# Patient Record
Sex: FEMALE | Race: WHITE | NOT HISPANIC OR LATINO | Employment: OTHER | URBAN - METROPOLITAN AREA
[De-identification: names, ages, dates, MRNs, and addresses within clinical notes are randomized per-mention and may not be internally consistent; named-entity substitution may affect disease eponyms.]

---

## 2017-04-24 ENCOUNTER — OFFICE VISIT (OUTPATIENT)
Dept: FAMILY MEDICINE | Facility: CLINIC | Age: 67
End: 2017-04-24
Payer: COMMERCIAL

## 2017-04-24 VITALS
HEART RATE: 74 BPM | WEIGHT: 132.6 LBS | DIASTOLIC BLOOD PRESSURE: 71 MMHG | BODY MASS INDEX: 25.04 KG/M2 | SYSTOLIC BLOOD PRESSURE: 105 MMHG | HEIGHT: 61 IN

## 2017-04-24 DIAGNOSIS — Z12.11 SPECIAL SCREENING FOR MALIGNANT NEOPLASMS, COLON: ICD-10-CM

## 2017-04-24 DIAGNOSIS — Z23 ENCOUNTER FOR IMMUNIZATION: ICD-10-CM

## 2017-04-24 DIAGNOSIS — M27.69 FAILING DENTAL IMPLANT: ICD-10-CM

## 2017-04-24 DIAGNOSIS — Z98.84 S/P GASTRIC BYPASS: ICD-10-CM

## 2017-04-24 DIAGNOSIS — F33.42 RECURRENT MAJOR DEPRESSION IN COMPLETE REMISSION (H): ICD-10-CM

## 2017-04-24 DIAGNOSIS — Z00.00 ROUTINE GENERAL MEDICAL EXAMINATION AT A HEALTH CARE FACILITY: Primary | ICD-10-CM

## 2017-04-24 DIAGNOSIS — E55.9 VITAMIN D DEFICIENCY: ICD-10-CM

## 2017-04-24 LAB — DEPRECATED CALCIDIOL+CALCIFEROL SERPL-MC: 11 UG/L (ref 20–75)

## 2017-04-24 PROCEDURE — 82306 VITAMIN D 25 HYDROXY: CPT | Performed by: FAMILY MEDICINE

## 2017-04-24 PROCEDURE — 99397 PER PM REEVAL EST PAT 65+ YR: CPT | Mod: 25 | Performed by: FAMILY MEDICINE

## 2017-04-24 PROCEDURE — 90670 PCV13 VACCINE IM: CPT | Performed by: FAMILY MEDICINE

## 2017-04-24 PROCEDURE — 90471 IMMUNIZATION ADMIN: CPT | Performed by: FAMILY MEDICINE

## 2017-04-24 PROCEDURE — 36415 COLL VENOUS BLD VENIPUNCTURE: CPT | Performed by: FAMILY MEDICINE

## 2017-04-24 RX ORDER — SERTRALINE HYDROCHLORIDE 100 MG/1
100 TABLET, FILM COATED ORAL DAILY
Qty: 90 TABLET | Refills: 3 | Status: SHIPPED | OUTPATIENT
Start: 2017-04-24 | End: 2018-06-20

## 2017-04-24 ASSESSMENT — ANXIETY QUESTIONNAIRES
2. NOT BEING ABLE TO STOP OR CONTROL WORRYING: NOT AT ALL
GAD7 TOTAL SCORE: 1
7. FEELING AFRAID AS IF SOMETHING AWFUL MIGHT HAPPEN: NOT AT ALL
6. BECOMING EASILY ANNOYED OR IRRITABLE: SEVERAL DAYS
5. BEING SO RESTLESS THAT IT IS HARD TO SIT STILL: NOT AT ALL
1. FEELING NERVOUS, ANXIOUS, OR ON EDGE: NOT AT ALL
3. WORRYING TOO MUCH ABOUT DIFFERENT THINGS: NOT AT ALL
IF YOU CHECKED OFF ANY PROBLEMS ON THIS QUESTIONNAIRE, HOW DIFFICULT HAVE THESE PROBLEMS MADE IT FOR YOU TO DO YOUR WORK, TAKE CARE OF THINGS AT HOME, OR GET ALONG WITH OTHER PEOPLE: NOT DIFFICULT AT ALL

## 2017-04-24 ASSESSMENT — PATIENT HEALTH QUESTIONNAIRE - PHQ9: 5. POOR APPETITE OR OVEREATING: NOT AT ALL

## 2017-04-24 NOTE — NURSING NOTE
"Chief Complaint   Patient presents with     Physical     annual physical     Refill Request     refills       Initial /71  Pulse 74  Ht 5' 1\" (1.549 m)  Wt 132 lb 9.6 oz (60.1 kg)  BMI 25.05 kg/m2 Estimated body mass index is 25.05 kg/(m^2) as calculated from the following:    Height as of this encounter: 5' 1\" (1.549 m).    Weight as of this encounter: 132 lb 9.6 oz (60.1 kg).  Medication Reconciliation: complete  "

## 2017-04-24 NOTE — MR AVS SNAPSHOT
After Visit Summary   4/24/2017    Carly Gregg    MRN: [id number]           Patient Information     Date Of Birth          1950        Visit Information        Provider Department      4/24/2017 7:20 AM Ayde Tatum MD [facility]        Today's Diagnoses     Encounter for immunization    -  1    Special screening for malignant neoplasms, colon        Recurrent major depression in complete remission (H)        S/P gastric bypass        Failing dental implant          Care Instructions      Preventive Health Recommendations  Female Ages 65 +    Yearly exam:     See your health care provider every year in order to  o Review health changes.   o Discuss preventive care.    o Review your medicines if your doctor has prescribed any.      You no longer need a yearly Pap test unless you've had an abnormal Pap test in the past 10 years. If you have vaginal symptoms, such as bleeding or discharge, be sure to talk with your provider about a Pap test.      Every 1 to 2 years, have a mammogram.  If you are over 69, talk with your health care provider about whether or not you want to continue having screening mammograms.      Every 10 years, have a colonoscopy. Or, have a yearly FIT test (stool test). These exams will check for colon cancer.       Have a cholesterol test every 5 years, or more often if your doctor advises it.       Have a diabetes test (fasting glucose) every three years. If you are at risk for diabetes, you should have this test more often.       At age 65, have a bone density scan (DEXA) to check for osteoporosis (brittle bone disease).    Shots:    Get a flu shot each year.    Get a tetanus shot every 10 years.    Talk to your doctor about your pneumonia vaccines. There are now two you should receive - Pneumovax (PPSV 23) and Prevnar (PCV 13).    Talk to your doctor about the shingles vaccine.    Talk to your doctor about the hepatitis B vaccine.    Nutrition:      Eat at least 5 servings of fruits and vegetables each day.      Eat whole-grain bread, whole-wheat pasta and brown rice instead of white grains and rice.      Talk to your provider about Calcium and Vitamin D.     Lifestyle    Exercise at least 150 minutes a week (30 minutes a day, 5 days a week). This will help you control your weight and prevent disease.      Limit alcohol to one drink per day.      No smoking.       Wear sunscreen to prevent skin cancer.       See your dentist twice a year for an exam and cleaning.      See your eye doctor every 1 to 2 years to screen for conditions such as glaucoma, macular degeneration, cataracts, etc     The 10-year ASCVD risk score (Katelynnteofilo VIVEROS Jr, et al., 2013) is: 3%    Values used to calculate the score:      Age: 66 years      Sex: Female      Is Non- : No      Diabetic: No      Tobacco smoker: No      Systolic Blood Pressure: 105 mmHg      Is BP treated: No      HDL Cholesterol: 91 mg/dL      Total Cholesterol: 194 mg/dL          Follow-ups after your visit        Future tests that were ordered for you today     Open Future Orders        Priority Expected Expires Ordered    DX Hip/Pelvis/Spine Routine  4/24/2018 4/24/2017    Fecal colorectal cancer screen (FIT) Routine 5/15/2017 7/17/2017 4/24/2017            Who to contact     If you have questions or need follow up information about today's clinic visit or your schedule please contact [facility] directly at [phone].  Normal or non-critical lab and imaging results will be communicated to you by MyChart, letter or phone within 4 business days after the clinic has received the results. If you do not hear from us within 7 days, please contact the clinic through MyChart or phone. If you have a critical or abnormal lab result, we will notify you by phone as soon as possible.  Submit refill requests through [employer] or call your pharmacy and they will forward the refill request to us.  "Please allow 3 business days for your refill to be completed.          Additional Information About Your Visit        MyChart Information     [employer] gives you secure access to your electronic health record. If you see a primary care provider, you can also send messages to your care team and make appointments. If you have questions, please call your primary care clinic.  If you do not have a primary care provider, please call [phone] and they will assist you.        Care EveryWhere ID     This is your Care EveryWhere ID. This could be used by other organizations to access your [facility] medical records  [id number]        Your Vitals Were     Pulse Height BMI (Body Mass Index)             74 5' 1\" (1.549 m) 25.05 kg/m2          Blood Pressure from Last 3 Encounters:   04/24/17 105/71   06/30/16 112/69   06/25/16 105/69    Weight from Last 3 Encounters:   04/24/17 132 lb 9.6 oz (60.1 kg)   06/30/16 138 lb (62.6 kg)   06/24/16 138 lb 7.2 oz (62.8 kg)              We Performed the Following     PNEUMOCOCCAL CONJ VACCINE 13 VALENT IM     Vitamin D Deficiency          Where to get your medicines      These medications were sent to [email or web address] [address] [place] [address] AT [place] [id number] [facility] [address]     Phone:  [phone]     Folic Acid-Vit B6-Vit B12 2.5-25-1 MG Tabs         Some of these will need a paper prescription and others can be bought over the counter.  Ask your nurse if you have questions.     Bring a paper prescription for each of these medications     sertraline 100 MG tablet          Primary Care Provider Office Phone # Fax #    Ayde Tatum [phone]       [facility] [address] [facility] [address]        Thank you!     Thank you for choosing [facility]  for your care. Our goal is always to provide you with excellent care. Hearing back from our patients is one way we can " continue to improve our services. Please take a few minutes to complete the written survey that you may receive in the mail after your visit with us. Thank you!             Your Updated Medication List - Protect others around you: Learn how to safely use, store and throw away your medicines at www.disposemymeds.org.          This list is accurate as of: 4/24/17  7:45 AM.  Always use your most recent med list.                   Brand Name Dispense Instructions for use    Folic Acid-Vit B6-Vit B12 2.5-25-1 MG Tabs     90 tablet    Take 1 tablet by mouth daily       sertraline 100 MG tablet    ZOLOFT    90 tablet    Take 1 tablet (100 mg) by mouth daily

## 2017-04-24 NOTE — PATIENT INSTRUCTIONS

## 2017-04-24 NOTE — PROGRESS NOTES
SUBJECTIVE:     CC: Carly Gregg is an 66 year old woman who presents for preventive health visit.     Healthy Habits:    Do you get at least three servings of calcium containing foods daily (dairy, green leafy vegetables, etc.)? Yes    Amount of exercise or daily activities, outside of work: 3 day(s) per week    Problems taking medications regularly No    Medication side effects: No    Have you had an eye exam in the past two years? no    Do you see a dentist twice per year? yes    Do you have sleep apnea, excessive snoring or daytime drowsiness? no        Today's PHQ-2 Score:   PHQ-2 ( 1999 Pf[employer]) 4/18/2017 2/26/2016   Q1: Little interest or pleasure in doing things - -   Q2: Feeling down, depressed or hopeless - -   PHQ-2 Score - -   Little interest or pleasure in doing things Several days Not at all   Feeling down, depressed or hopeless Not at all Not at all   PHQ-2 Score 1 0       Abuse: Current or Past(Physical, Sexual or Emotional)- No  Do you feel safe in your environment - Yes    Social History   Substance Use Topics     Smoking status: Never Smoker     Smokeless tobacco: Never Used     Alcohol use Yes      Comment: glass of wine monthly if that     The patient does not drink >3 drinks per day nor >7 drinks per week.    Recent Labs   Lab Test  03/02/16   0749  02/21/13   0845  02/20/12   1358   CHOL  194  191  201*   HDL  91  69  82   LDL  91  110  99   TRIG  60  58  103   CHOLHDLRATIO   --   3.0  2.0   NHDL  103   --    --        Reviewed orders with patient.  Reviewed health maintenance and updated orders accordingly - Yes    Mammo Decision Support:  Patient over age 50, mutual decision to screen reflected in health maintenance.    Pertinent mammograms are reviewed under the imaging tab.  History of abnormal Pap smear:   Last 3 Pap Results:   PAP (no units)   Date Value   02/25/2014 NIL   02/20/2012 NIL       Reviewed and updated as needed this visit by clinical staff  Tobacco  Med Hx  Surg  Hx  Fam Hx  Soc Hx        Reviewed and updated as needed this visit by Provider        Past Medical History:   Diagnosis Date     B12 deficiency      Insomnia      Mood swings (H)       Past Surgical History:   Procedure Laterality Date     ESOPHAGOSCOPY, GASTROSCOPY, DUODENOSCOPY (EGD), COMBINED N/A 2016    Procedure: COMBINED ESOPHAGOSCOPY, GASTROSCOPY, DUODENOSCOPY (EGD);  Surgeon: Rivas Jade MD;  Location: [facility]     GASTRIC BYPASS       TONSILLECTOMY  age 29     Obstetric History       T0      TAB0   SAB0   E0   M0   L1       # Outcome Date GA Lbr [place]/2nd Weight Sex Delivery Anes PTL Lv   1                Obstetric Comments           ROS:  C: NEGATIVE for fever, chills, change in weight  I: NEGATIVE for worrisome rashes, moles or lesions  E: NEGATIVE for vision changes or irritation  ENT: NEGATIVE for ear, mouth and throat problems  R: NEGATIVE for significant cough or SOB  B: NEGATIVE for masses, tenderness or discharge  CV: NEGATIVE for chest pain, palpitations or peripheral edema  GI: NEGATIVE for nausea, abdominal pain, heartburn, or change in bowel habits  : NEGATIVE for unusual urinary or vaginal symptoms. No vaginal bleeding.  M: NEGATIVE for significant arthralgias or myalgia  N: NEGATIVE for weakness, dizziness or paresthesias  P: NEGATIVE for changes in mood or affect     BP Readings from Last 3 Encounters:   17 105/71   16 112/69   16 105/69    Wt Readings from Last 3 Encounters:   17 132 lb 9.6 oz (60.1 kg)   16 138 lb (62.6 kg)   16 138 lb 7.2 oz (62.8 kg)                  Patient Active Problem List   Diagnosis     Contact dermatitis     CARDIOVASCULAR SCREENING; LDL GOAL LESS THAN 160     Advanced directives, counseling/discussion     S/P gastric bypass     Insomnia     Recurrent major depression in complete remission (H)     B12 deficiency anemia     Psychophysiological insomnia     Acute upper  gastrointestinal bleeding     Past Surgical History:   Procedure Laterality Date     ESOPHAGOSCOPY, GASTROSCOPY, DUODENOSCOPY (EGD), COMBINED N/A 2016    Procedure: COMBINED ESOPHAGOSCOPY, GASTROSCOPY, DUODENOSCOPY (EGD);  Surgeon: Rivas Jade MD;  Location: [facility]     GASTRIC BYPASS       TONSILLECTOMY  age 29       Social History   Substance Use Topics     Smoking status: Never Smoker     Smokeless tobacco: Never Used     Alcohol use Yes      Comment: glass of wine monthly if that     Family History   Problem Relation Age of Onset     CANCER Mother      stomach cancer     Cardiovascular Mother      MI     DIABETES Mother      C.A.D. Maternal Grandfather      bypass     Genitourinary Problems Brother      kidney transplant     HEART DISEASE Sister      heart murmur     HEART DISEASE Sister      heart murmur     CANCER Father      bone and blood cancer  6 months later     Suicide Maternal Grandmother      Fransisco     Suicide Brother      Hcristian         Current Outpatient Prescriptions   Medication Sig Dispense Refill     sertraline (ZOLOFT) 100 MG tablet Take 1 tablet (100 mg) by mouth daily 90 tablet 3     Folic Acid-Vit B6-Vit B12 2.5-25-1 MG TABS Take 1 tablet by mouth daily 90 tablet 3     [DISCONTINUED] sertraline (ZOLOFT) 100 MG tablet Take 1 tablet (100 mg) by mouth daily 90 tablet 3     [DISCONTINUED] Folic Acid-Vit B6-Vit B12 2.5-25-1 MG TABS Take  by mouth. Take one daily. (Patient taking differently: Take 1 tablet by mouth daily ) 90 tablet 3     Allergies   Allergen Reactions     Morphine Other (See Comments)     Morphine injection given for Hemorid infection.  Patient says she had trouble breathing and her heart raced.     Penicillins Unknown     Novocain [Procaine Hcl] Rash     Recent Labs   Lab Test  16   0749  14   1623   13   0845  12   1358   LDL   --   91   --    --   110  99   HDL   --   91   --    --   69  82   TRIG   --   60   --     "--   58  103   ALT  27   --   40   --    --    --    CR  0.67  0.79  0.75   < >  0.75   --    GFRESTIMATED  88  73  78   < >  79   --    GFRESTBLACK  >90   GFR Calc    88  >90   GFR Calc     < >  >90   --    POTASSIUM  4.3  4.5  3.9   < >  4.5   --    TSH   --    --    --    --   1.85   --     < > = values in this interval not displayed.      OBJECTIVE:     /71  Pulse 74  Ht 5' 1\" (1.549 m)  Wt 132 lb 9.6 oz (60.1 kg)  BMI 25.05 kg/m2  EXAM:  GENERAL APPEARANCE: healthy, alert and no distress  EYES: Eyes grossly normal to inspection, PERRL and conjunctivae and sclerae normal  HENT: ear canals and TM's normal, nose and mouth without ulcers or lesions, oropharynx clear and oral mucous membranes moist  NECK: no adenopathy, no asymmetry, masses, or scars and thyroid normal to palpation  RESP: lungs clear to auscultation - no rales, rhonchi or wheezes  BREAST: normal without masses, tenderness or nipple discharge and no palpable axillary masses or adenopathy  CV: regular rate and rhythm, normal S1 S2, no S3 or S4, no murmur, click or rub, no peripheral edema and peripheral pulses strong  ABDOMEN: soft, nontender, no hepatosplenomegaly, no masses and bowel sounds normal   (female): deferred  MS: no musculoskeletal defects are noted and gait is age appropriate without ataxia  SKIN: no suspicious lesions or rashes  NEURO: Normal strength and tone, sensory exam grossly normal, mentation intact and speech normal  PSYCH: mentation appears normal and affect normal/bright    ASSESSMENT/PLAN:     1. Routine general medical examination at a health care facility  Low risk cervical cancer, low risk breast cancer.    2. Failing dental implant  Dentist requested vitamin D evaluation  - DX Hip/Pelvis/Spine; Future  - Vitamin D Deficiency    3. Recurrent major depression in complete remission (H)  due for review and refill, taking medication without difficulty  - sertraline (ZOLOFT) 100 MG " "tablet; Take 1 tablet (100 mg) by mouth daily  Dispense: 90 tablet; Refill: 3    4. Special screening for malignant neoplasms, colon  - Fecal colorectal cancer screen (FIT); Future    5. S/P gastric bypass  - Folic Acid-Vit B6-Vit B12 2.5-25-1 MG TABS; Take 1 tablet by mouth daily  Dispense: 90 tablet; Refill: 3    6. Encounter for immunization  - PNEUMOCOCCAL CONJ VACCINE 13 VALENT IM    COUNSELING:   Reviewed preventive health counseling, as reflected in patient instructions       Regular exercise       Healthy diet/nutrition       Vision screening       Hearing screening         reports that she has never smoked. She has never used smokeless tobacco.    Estimated body mass index is 25.05 kg/(m^2) as calculated from the following:    Height as of this encounter: 5' 1\" (1.549 m).    Weight as of this encounter: 132 lb 9.6 oz (60.1 kg).       Counseling Resources:  ATP IV Guidelines  Pooled Cohorts Equation Calculator  Breast Cancer Risk Calculator  FRAX Risk Assessment  ICSI Preventive Guidelines  Dietary Guidelines for Americans, 2010  [employer]'s MyPlate  ASA Prophylaxis  Lung CA Screening    Ayde Tatum MD  [facility] for HPI/ROS submitted by the patient on 4/18/2017   Annual Exam:  Getting at least 3 servings of Calcium per day:: NO  Bi-annual eye exam:: NO  Dental care twice a year:: Yes  Sleep apnea or symptoms of sleep apnea:: None  Diet:: Regular (no restrictions)  Frequency of exercise:: 2-3 days/week  Taking medications regularly:: No  Medication side effects:: Not applicable  Additional concerns today:: YES  Q1: Little interest or pleasure in doing things: 1=Several days  Q2: Feeling down, depressed or hopeless: 0=Not at all  PHQ-2 Score: 1  Duration of exercise:: 15-30 minutes  Barriers to taking medications:: Problems remembering to take them    "

## 2017-04-24 NOTE — NURSING NOTE
Screening Questionnaire for Adult Immunization    Are you sick today?   No   Do you have allergies to medications, food, a vaccine component or latex?   No   Have you ever had a serious reaction after receiving a vaccination?   No   Do you have a long-term health problem with heart disease, lung disease, asthma, kidney disease, metabolic disease (e.g. diabetes), anemia, or other blood disorder?   No   Do you have cancer, leukemia, HIV/AIDS, or any other immune system problem?   No   In the past 3 months, have you taken medications that affect  your immune system, such as prednisone, other steroids, or anticancer drugs; drugs for the treatment of rheumatoid arthritis, Crohn s disease, or psoriasis; or have you had radiation treatments?   No   Have you had a seizure, or a brain or other nervous system problem?   No   During the past year, have you received a transfusion of blood or blood     products, or been given immune (gamma) globulin or antiviral drug?   No   For women: Are you pregnant or is there a chance you could become        pregnant during the next month?   No   Have you received any vaccinations in the past 4 weeks?   No     Immunization questionnaire answers were all negative.      MNVFC doesn't apply on this patient    Per orders of Dr. Tatum, injection of Prevnar  13 given by Terri Kumar. Patient instructed to remain in clinic for 20 minutes afterwards, and to report any adverse reaction to me immediately.       Screening performed by Terri Kumar on 4/24/2017 at 7:27 AM.

## 2017-04-25 RX ORDER — ERGOCALCIFEROL 1.25 MG/1
50000 CAPSULE, LIQUID FILLED ORAL WEEKLY
Qty: 12 CAPSULE | Refills: 0 | Status: SHIPPED | OUTPATIENT
Start: 2017-04-25 | End: 2017-07-12

## 2017-04-25 ASSESSMENT — ANXIETY QUESTIONNAIRES: GAD7 TOTAL SCORE: 1

## 2017-04-25 ASSESSMENT — PATIENT HEALTH QUESTIONNAIRE - PHQ9: SUM OF ALL RESPONSES TO PHQ QUESTIONS 1-9: 1

## 2017-04-28 DIAGNOSIS — Z12.11 SPECIAL SCREENING FOR MALIGNANT NEOPLASMS, COLON: ICD-10-CM

## 2017-04-28 PROCEDURE — 82274 ASSAY TEST FOR BLOOD FECAL: CPT | Performed by: FAMILY MEDICINE

## 2017-05-04 LAB — HEMOCCULT STL QL IA: NEGATIVE

## 2017-05-23 ENCOUNTER — HOSPITAL ENCOUNTER (OUTPATIENT)
Dept: BONE DENSITY | Facility: CLINIC | Age: 67
Discharge: HOME | End: 2017-05-23
Attending: FAMILY MEDICINE | Admitting: FAMILY MEDICINE
Payer: COMMERCIAL

## 2017-05-23 DIAGNOSIS — M27.69 FAILING DENTAL IMPLANT: ICD-10-CM

## 2017-05-23 PROCEDURE — 77080 DXA BONE DENSITY AXIAL: CPT

## 2017-06-22 ENCOUNTER — APPOINTMENT (OUTPATIENT)
Dept: GENERAL RADIOLOGY | Facility: CLINIC | Age: 67
End: 2017-06-22
Attending: NURSE PRACTITIONER
Payer: COMMERCIAL

## 2017-06-22 ENCOUNTER — HOSPITAL ENCOUNTER (EMERGENCY)
Facility: CLINIC | Age: 67
Discharge: HOME | End: 2017-06-22
Attending: NURSE PRACTITIONER | Admitting: NURSE PRACTITIONER
Payer: COMMERCIAL

## 2017-06-22 VITALS — HEIGHT: 61 IN | BODY MASS INDEX: 24.73 KG/M2 | WEIGHT: 131 LBS
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
## 2017-06-22 DIAGNOSIS — S92.501A: Primary | ICD-10-CM

## 2017-06-22 PROCEDURE — 73660 X-RAY EXAM OF TOE(S): CPT | Mod: RT

## 2017-06-22 PROCEDURE — 99213 OFFICE O/P EST LOW 20 MIN: CPT | Performed by: NURSE PRACTITIONER

## 2017-06-22 PROCEDURE — 99213 OFFICE O/P EST LOW 20 MIN: CPT

## 2017-06-22 NOTE — ED AVS SNAPSHOT
[facility]    [address] [facility] [address]    Phone:  [phone]    Fax:  [phone]                                       Carly Gregg   MRN: [id number]    Department:  [facility]   Date of Visit:  6/22/2017           Patient Information     Date Of Birth          1950        Your diagnoses for this visit were:     Closed fracture of phalanx of right second toe        You were seen by Radha Clayton APRN CNP.      Follow-up Information     Follow up with Adye Tatum MD.    Specialty:  Family Practice    Why:  As needed, If symptoms worsen    Contact information:    [facility]  [address] [facility] [address]  [phone]          Discharge Instructions         Leg Fracture    You have a break (fracture) of the leg. A fracture is treated with a splint, cast, or special boot. It will usually take at about 8 to 12 weeks for the fracture to heal, but it can take several months in some cases. If you have a severe injury, you may need surgery to fix it.  Home care  Follow these guidelines when caring for yourself at home:    You will be given a splint, cast, boot, or other device to keep the injured area from moving. Unless you were told otherwise, use crutches or a walker. Don t put weight on the injured leg until your healthcare provider says you can do so. (You can rent crutches and a walker at many pharmacies and surgical or orthopedic supply stores.)    Keep your leg elevated to reduce pain and swelling. When sleeping, put a pillow under the injured leg. When sitting, support the injured leg so it is above your waist. This is very important during the first 2 days (48 hours).    Put an ice pack on the injured area. Do this for 20 minutes every 1 to 2 hours the first day for pain relief. You can make an ice pack by wrapping a plastic bag of ice cubes in a thin towel. As the ice melts, be careful that the  cast, splint, or boot doesn t get wet. You can put the ice pack directly over the splint or cast. Continue using the ice pack 3 to 4 times a day for the next 2 days. Then use the ice pack as needed to ease pain and swelling.    Keep the cast, splint, or boot completely dry at all times. Bathe with your cast, splint, or boot out of the water. Protect it with a large plastic bag, rubber-banded at the top end. If a boot or fiberglass cast or splint gets wet, you can dry it with a hair dryer.    You may use acetaminophen or ibuprofen to control pain, unless another pain medicine was prescribed. If you have chronic liver or kidney disease, talk with your healthcare provider before using these medicines. Also talk with your provider if you ve had a stomach ulcer or gastrointestinal bleeding.    Don t put creams or objects under the cast if you have itching.  Follow-up care  Follow up with your healthcare provider, or as advised. This is to make sure the bone is healing the way it should. If a splint was put on, it may be converted to a cast at your next visit.  X-rays may be taken. You will be told of any new findings that may affect your care.  When to seek medical advice  Call your healthcare provider right away if any of these occur:    The cast or splint cracks    The plaster cast or splint becomes wet or soft    The fiberglass cast or splint stays wet for more than 24 hours    Bad odor from the cast or wound fluid stains the cast    Tightness or pain under the cast or splint gets worse    Toes become swollen, cold, blue, numb, or tingly    You can t move your toes    Skin around cast or splint becomes red    Fever of 100.4 F (38 C) or higher, or as directed by your healthcare provider  Date Last Reviewed: 2/1/2017 2000-2017 The [employer]. [address] [place] [address], [place] [address]. All rights reserved. This information is not intended as a substitute for professional medical care. Always follow your  healthcare professional's instructions.          Finger or Toe Fractures (Broken Finger or Toe)  A hard blow can break a bone in your toe or finger. Broken bones are also known as fractures. Even minor fractures need medical care. Without treatment, they may heal crooked, remain stiff, or develop other problems.    When to go to the Emergency Room (ER)  You may not always know when you have a fractured toe or finger. Apply ice to the injury right away. Then, seek medical care if:    Your finger or toe is swollen or very painful.    You cannot move your finger or toe normally.    Your injured toe or finger is pale or blue.    You are bleeding.    A bone protrudes through your skin.  What to expect in the ER  A healthcare provider will ask about your injury and examine your toe or finger. You may have X-rays. Treatment will depend on the type of fracture you have.  Toe fracture  Your healthcare provider may straighten a broken toe. You'll be given local anesthesia so you won't feel any discomfort during this procedure. Your injured toe may then be splinted by being taped to a toe next to it, or placed on a pad that's taped to your foot. Your healthcare provider may also ask you to apply ice and keep your foot elevated.  Finger fracture  Your healthcare provider may straighten a broken finger. A broken finger is likely to be placed in a metal splint. This helps straighten and protect the finger while it heals. Your healthcare provider may give you exercises to do as your injury heals, to prevent stiffness in your finger.  Date Last Reviewed: 9/28/2015 2000-2017 The [employer]. [address] [place] [address], [place] [id number]. All rights reserved. This information is not intended as a substitute for professional medical care. Always follow your healthcare professional's instructions.          Closed Toe Fracture  Your toe is broken (fractured). This causes local pain, swelling, and sometimes bruising. This injury  usually takes about 4 to 6 weeks to heal, but can sometimes take longer. Toe injuries are often treated by taping the injured toe to the next one (buddy taping). This protects the injured toe and holds it in position.    If the toenail has been severely injured, it may fall off in 1 to 2 weeks. It takes up to 12 months for a new toenail to grow back.  Home care  Follow these guidelines when caring for yourself at home:    You may be given a cast shoe to wear to keep your toe from moving. If not, you can use a sandal or any shoe that doesn t put pressure on the injured toe until the swelling and pain go away. If using a sandal, be careful not to strike your foot against anything. Another injury could make the fracture worse. If you were given crutches, don t put full weight on the injured foot until you can do so without pain, or as directed by your healthcare provider.    Keep your foot elevated to reduce pain and swelling. When sleeping, put a pillow under the injured leg. When sitting, support the injured leg so it is above your waist. This is very important during the first 2 days (48 hours).    Put an ice pack on the injured area. Do this for 20 minutes every 1 to 2 hours the first day for pain relief. You can make an ice pack by wrapping a plastic bag of ice cubes in a thin towel. As the ice melts, be careful that any cloth or paper tape doesn t get wet. Continue using the ice pack 3 to 4 times a day for the next 2 days. Then use the ice pack as needed to ease pain and swelling.    If buddy tape was used and it becomes wet or dirty, change it. You may replace it with paper, plastic, or cloth tape. Cloth tape and paper tapes must be kept dry.    You may use acetaminophen or ibuprofen to control pain, unless another pain medicine was prescribed. If you have chronic liver or kidney disease, talk with your healthcare provider before using these medicines. Also talk with your provider if you ve had a stomach ulcer  or gastrointestinal bleeding.    You may return to sports or physical education activities after 4 weeks when you can run without pain, or as directed by your healthcare provider.  Follow-up care  Follow up with your healthcare provider in 1 week, or as advised. This is to make sure the bone is healing the way it should.  X-rays may be taken. You will be told of any new findings that may affect your care.  When to seek medical advice  Call your healthcare provider right away if any of these occur:    Pain or swelling gets worse    The cast/splint cracks    The cast and padding get wet and stays wet more than 24 hours    Bad odor from the cast/splint or wound fluid stains the cast    Tightness or pressure under the cast/splint gets worse    Toe becomes cold, blue, numb, or tingly    You can t move the toe    Signs of infection: fever, redness, warmth, swelling, or drainage from the wound or cast    Fever of 100.4 F (38 C) or higher, or as directed by your healthcare provider  Date Last Reviewed: 2/1/2017 2000-2017 The [employer]. [address] [place] [address]. All rights reserved. This information is not intended as a substitute for professional medical care. Always follow your healthcare professional's instructions.          24 Hour Appointment Hotline       To make an appointment at any [facility], call [phone] ([phone]). If you don't have a family doctor or clinic, we will help you find one. [facility] clinics are conveniently located to serve the needs of you and your family.             Review of your medicines      Our records show that you are taking the medicines listed below. If these are incorrect, please call your family doctor or clinic.        Dose / Directions Last dose taken    Folic Acid-Vit B6-Vit B12 2.5-25-1 MG Tabs   Dose:  1 tablet   Quantity:  90 tablet        Take 1 tablet by mouth daily   Refills:  3        sertraline 100 MG tablet   Commonly known  as:  ZOLOFT   Dose:  100 mg   Quantity:  90 tablet        Take 1 tablet (100 mg) by mouth daily   Refills:  3        vitamin D 17615 UNIT capsule   Commonly known as:  ERGOCALCIFEROL   Dose:  97966 Units   Quantity:  12 capsule        Take 1 capsule (50,000 Units) by mouth once a week for 12 doses   Refills:  0                Procedures and tests performed during your visit     XR Toe Right G/E 2 Views      Orders Needing Specimen Collection     None      Pending Results     No orders found from 6/20/2017 to 6/23/2017.            Pending Culture Results     No orders found from 6/20/2017 to 6/23/2017.            Pending Results Instructions     If you had any lab results that were not finalized at the time of your Discharge, you can call the ED Lab Result RN at [phone]. You will be contacted by this team for any positive Lab results or changes in treatment. The nurses are available 7 days a week from 10A to 6:30P.  You can leave a message 24 hours per day and they will return your call.        Test Results From Your Hospital Stay        6/22/2017  6:52 PM      Narrative     RIGHT TOE TWO OR MORE VIEWS   6/22/2017 6:38 PM     HISTORY: Stubbed toe.    COMPARISON: None.    FINDINGS: Moderately displaced fracture of the shaft of the proximal  phalanx of the right second toe. Exam otherwise negative.        Impression     IMPRESSION: Fracture of the proximal phalanx of the right second toe.     EDGAR ECHEVERRIA MD                Thank you for choosing [facility]       Thank you for choosing [facility] for your care. Our goal is always to provide you with excellent care. Hearing back from our patients is one way we can continue to improve our services. Please take a few minutes to complete the written survey that you may receive in the mail after you visit with us. Thank you!        [employer] Information     [employer] gives you secure access to your electronic health record. If you see a primary care provider, you can also  send messages to your care team and make appointments. If you have questions, please call your primary care clinic.  If you do not have a primary care provider, please call [phone] and they will assist you.        Care EveryWhere ID     This is your Care EveryWhere ID. This could be used by other organizations to access your [facility] medical records  [id number]        Equal Access to Services     CRISTINA KEANE : Fabiola Ott, farhat lozada, christ kaalmafiona serrato, kailey rueda . So [facility] [phone].    ATENCIÓN: Si habla español, tiene a hull disposición servicios gratuitos de asistencia lingüística. Llame al [phone].    We comply with applicable federal civil rights laws and [place] laws. We do not discriminate on the basis of race, color, national origin, age, disability sex, sexual orientation or gender identity.            After Visit Summary       This is your record. Keep this with you and show to your community pharmacist(s) and doctor(s) at your next visit.

## 2017-06-22 NOTE — DISCHARGE INSTRUCTIONS
Leg Fracture    You have a break (fracture) of the leg. A fracture is treated with a splint, cast, or special boot. It will usually take at about 8 to 12 weeks for the fracture to heal, but it can take several months in some cases. If you have a severe injury, you may need surgery to fix it.  Home care  Follow these guidelines when caring for yourself at home:    You will be given a splint, cast, boot, or other device to keep the injured area from moving. Unless you were told otherwise, use crutches or a walker. Don t put weight on the injured leg until your healthcare provider says you can do so. (You can rent crutches and a walker at many pharmacies and surgical or orthopedic supply stores.)    Keep your leg elevated to reduce pain and swelling. When sleeping, put a pillow under the injured leg. When sitting, support the injured leg so it is above your waist. This is very important during the first 2 days (48 hours).    Put an ice pack on the injured area. Do this for 20 minutes every 1 to 2 hours the first day for pain relief. You can make an ice pack by wrapping a plastic bag of ice cubes in a thin towel. As the ice melts, be careful that the cast, splint, or boot doesn t get wet. You can put the ice pack directly over the splint or cast. Continue using the ice pack 3 to 4 times a day for the next 2 days. Then use the ice pack as needed to ease pain and swelling.    Keep the cast, splint, or boot completely dry at all times. Bathe with your cast, splint, or boot out of the water. Protect it with a large plastic bag, rubber-banded at the top end. If a boot or fiberglass cast or splint gets wet, you can dry it with a hair dryer.    You may use acetaminophen or ibuprofen to control pain, unless another pain medicine was prescribed. If you have chronic liver or kidney disease, talk with your healthcare provider before using these medicines. Also talk with your provider if you ve had a stomach ulcer or  gastrointestinal bleeding.    Don t put creams or objects under the cast if you have itching.  Follow-up care  Follow up with your healthcare provider, or as advised. This is to make sure the bone is healing the way it should. If a splint was put on, it may be converted to a cast at your next visit.  X-rays may be taken. You will be told of any new findings that may affect your care.  When to seek medical advice  Call your healthcare provider right away if any of these occur:    The cast or splint cracks    The plaster cast or splint becomes wet or soft    The fiberglass cast or splint stays wet for more than 24 hours    Bad odor from the cast or wound fluid stains the cast    Tightness or pain under the cast or splint gets worse    Toes become swollen, cold, blue, numb, or tingly    You can t move your toes    Skin around cast or splint becomes red    Fever of 100.4 F (38 C) or higher, or as directed by your healthcare provider  Date Last Reviewed: 2/1/2017 2000-2017 The [employer]. [address] [place] [address]. All rights reserved. This information is not intended as a substitute for professional medical care. Always follow your healthcare professional's instructions.          Finger or Toe Fractures (Broken Finger or Toe)  A hard blow can break a bone in your toe or finger. Broken bones are also known as fractures. Even minor fractures need medical care. Without treatment, they may heal crooked, remain stiff, or develop other problems.    When to go to the Emergency Room (ER)  You may not always know when you have a fractured toe or finger. Apply ice to the injury right away. Then, seek medical care if:    Your finger or toe is swollen or very painful.    You cannot move your finger or toe normally.    Your injured toe or finger is pale or blue.    You are bleeding.    A bone protrudes through your skin.  What to expect in the ER  A healthcare provider will ask about your injury and  examine your toe or finger. You may have X-rays. Treatment will depend on the type of fracture you have.  Toe fracture  Your healthcare provider may straighten a broken toe. You'll be given local anesthesia so you won't feel any discomfort during this procedure. Your injured toe may then be splinted by being taped to a toe next to it, or placed on a pad that's taped to your foot. Your healthcare provider may also ask you to apply ice and keep your foot elevated.  Finger fracture  Your healthcare provider may straighten a broken finger. A broken finger is likely to be placed in a metal splint. This helps straighten and protect the finger while it heals. Your healthcare provider may give you exercises to do as your injury heals, to prevent stiffness in your finger.  Date Last Reviewed: 9/28/2015 2000-2017 The [employer]. [address] [place] [id number]. All rights reserved. This information is not intended as a substitute for professional medical care. Always follow your healthcare professional's instructions.          Closed Toe Fracture  Your toe is broken (fractured). This causes local pain, swelling, and sometimes bruising. This injury usually takes about 4 to 6 weeks to heal, but can sometimes take longer. Toe injuries are often treated by taping the injured toe to the next one (hector taping). This protects the injured toe and holds it in position.    If the toenail has been severely injured, it may fall off in 1 to 2 weeks. It takes up to 12 months for a new toenail to grow back.  Home care  Follow these guidelines when caring for yourself at home:    You may be given a cast shoe to wear to keep your toe from moving. If not, you can use a sandal or any shoe that doesn t put pressure on the injured toe until the swelling and pain go away. If using a sandal, be careful not to strike your foot against anything. Another injury could make the fracture worse. If you were given crutches, don t  put full weight on the injured foot until you can do so without pain, or as directed by your healthcare provider.    Keep your foot elevated to reduce pain and swelling. When sleeping, put a pillow under the injured leg. When sitting, support the injured leg so it is above your waist. This is very important during the first 2 days (48 hours).    Put an ice pack on the injured area. Do this for 20 minutes every 1 to 2 hours the first day for pain relief. You can make an ice pack by wrapping a plastic bag of ice cubes in a thin towel. As the ice melts, be careful that any cloth or paper tape doesn t get wet. Continue using the ice pack 3 to 4 times a day for the next 2 days. Then use the ice pack as needed to ease pain and swelling.    If buddy tape was used and it becomes wet or dirty, change it. You may replace it with paper, plastic, or cloth tape. Cloth tape and paper tapes must be kept dry.    You may use acetaminophen or ibuprofen to control pain, unless another pain medicine was prescribed. If you have chronic liver or kidney disease, talk with your healthcare provider before using these medicines. Also talk with your provider if you ve had a stomach ulcer or gastrointestinal bleeding.    You may return to sports or physical education activities after 4 weeks when you can run without pain, or as directed by your healthcare provider.  Follow-up care  Follow up with your healthcare provider in 1 week, or as advised. This is to make sure the bone is healing the way it should.  X-rays may be taken. You will be told of any new findings that may affect your care.  When to seek medical advice  Call your healthcare provider right away if any of these occur:    Pain or swelling gets worse    The cast/splint cracks    The cast and padding get wet and stays wet more than 24 hours    Bad odor from the cast/splint or wound fluid stains the cast    Tightness or pressure under the cast/splint gets worse    Toe becomes cold,  blue, numb, or tingly    You can t move the toe    Signs of infection: fever, redness, warmth, swelling, or drainage from the wound or cast    Fever of 100.4 F (38 C) or higher, or as directed by your healthcare provider  Date Last Reviewed: 2/1/2017 2000-2017 [employer]. [address] [place] [id number]. All rights reserved. This information is not intended as a substitute for professional medical care. Always follow your healthcare professional's instructions.

## 2017-06-22 NOTE — ED PROVIDER NOTES
History     Chief Complaint   Patient presents with     Toe Injury     Pain right 2nd toe - hit on a plant stand last night - with swelling and slight deformity.     HPI  Carly Gregg is a 66 year old female who present with right second toe injury.  Pt states she got up in the middle of the night and stubbed her second toe of her right foot.   She states she has been experiencing pain with weight bearing activity but denies swelling or bruising.  She states it seems to be angled in a different direction than it was previously.    I have reviewed the Medications, Allergies, Past Medical and Surgical History, and Social History in the Epic system.    Allergies:   Allergies   Allergen Reactions     Morphine Other (See Comments)     Morphine injection given for Hemorid infection.  Patient says she had trouble breathing and her heart raced.     Penicillins Unknown     Novocain [Procaine Hcl] Rash       No current facility-administered medications on file prior to encounter.   Current Outpatient Prescriptions on File Prior to Encounter:  vitamin D (ERGOCALCIFEROL) 63158 UNIT capsule Take 1 capsule (50,000 Units) by mouth once a week for 12 doses   sertraline (ZOLOFT) 100 MG tablet Take 1 tablet (100 mg) by mouth daily   Folic Acid-Vit B6-Vit B12 2.5-25-1 MG TABS Take 1 tablet by mouth daily     Patient Active Problem List   Diagnosis     Contact dermatitis     CARDIOVASCULAR SCREENING; LDL GOAL LESS THAN 160     Advanced directives, counseling/discussion     S/P gastric bypass     Insomnia     Recurrent major depression in complete remission (H)     B12 deficiency anemia     Psychophysiological insomnia     Acute upper gastrointestinal bleeding     Failing dental implant     Past Surgical History:   Procedure Laterality Date     ESOPHAGOSCOPY, GASTROSCOPY, DUODENOSCOPY (EGD), COMBINED N/A 6/24/2016    Procedure: COMBINED ESOPHAGOSCOPY, GASTROSCOPY, DUODENOSCOPY (EGD);  Surgeon: Rivas Jade MD;   "Location: [facility]     GASTRIC BYPASS  1980     TONSILLECTOMY  age 29       Review of Systems  10 point ROS of systems including Constitutional, Eyes, Respiratory, Cardiovascular, Gastroenterology, Genitourinary, Integumentary, Muscularskeletal, Psychiatric were all negative except for pertinent positives noted in my HPI.    Physical Exam   Height: 154.9 cm (5' 1\")  Weight: 59.4 kg (131 lb)  Physical Exam   Constitutional: She appears well-developed and well-nourished. No distress.   HENT:   Head: Normocephalic and atraumatic.   Neck: Normal range of motion.   Cardiovascular: Normal rate, regular rhythm and normal heart sounds.  Exam reveals no gallop and no friction rub.    No murmur heard.  Pulmonary/Chest: Effort normal and breath sounds normal.   Musculoskeletal:        Right foot: There is decreased range of motion (second toe decreased ROM), tenderness (at PIP joint of phalanx of second toe right foot), bony tenderness (at PIP joint of phalanx of second toe right foots) and deformity (very slight angulation laterally). There is no swelling, normal capillary refill and no crepitus.        Feet:    Skin: Skin is warm and dry. No rash noted. She is not diaphoretic. No erythema. No pallor.   Psychiatric: She has a normal mood and affect. Her behavior is normal. Judgment and thought content normal.   Nursing note and vitals reviewed.      ED Course     ED Course     Procedures    Labs Ordered and Resulted from Time of ED Arrival Up to the Time of Departure from the ED - No data to display  Results for orders placed or performed during the hospital encounter of 06/22/17   XR Toe Right G/E 2 Views    Narrative    RIGHT TOE TWO OR MORE VIEWS   6/22/2017 6:38 PM     HISTORY: Stubbed toe.    COMPARISON: None.    FINDINGS: Moderately displaced fracture of the shaft of the proximal  phalanx of the right second toe. Exam otherwise negative.      Impression    IMPRESSION: Fracture of the proximal phalanx of the right second " toe.     EDGAR ECHEVERRIA MD       Assessments & Plan (with Medical Decision Making)     I have reviewed the nursing notes.    I have reviewed the findings, diagnosis, plan and need for follow up with the patient.  Carly Gregg is a 66 year old female who present with right second toe injury.  Pt states she got up in the middle of the night and stubbed her second toe of her right foot.   She states she has been experiencing pain with weight bearing activity but denies swelling or bruising.  She states it seems to be angled in a different direction than it was previously.  Exam reveals decreased ROM of second toe right foot and tenderness at the PIP joint.  Xray reveals displaced fracture of the shaft.  Hector taped to great toe to help reduce displacement and recommend firm soled shoes for 4- 6 weeks.  Did consider ortho referral due to displacement and consulted with Alexia Mayer regarding treatment and she recommended hector taping only.        Discharge Medication List as of 6/22/2017  6:57 PM          Final diagnoses:   Closed fracture of phalanx of right second toe       6/22/2017   [facility] EMERGENCY DEPARTMENT     Radha Clayton, LUCIANA CNP  06/22/17 1914

## 2017-08-03 DIAGNOSIS — E55.9 VITAMIN D DEFICIENCY: ICD-10-CM

## 2017-08-03 LAB — DEPRECATED CALCIDIOL+CALCIFEROL SERPL-MC: 27 UG/L (ref 20–75)

## 2017-08-03 PROCEDURE — 82306 VITAMIN D 25 HYDROXY: CPT | Performed by: FAMILY MEDICINE

## 2017-08-03 PROCEDURE — 36415 COLL VENOUS BLD VENIPUNCTURE: CPT | Performed by: FAMILY MEDICINE

## 2018-03-02 ENCOUNTER — OFFICE VISIT (OUTPATIENT)
Dept: FAMILY MEDICINE | Facility: CLINIC | Age: 68
End: 2018-03-02
Payer: COMMERCIAL

## 2018-03-02 VITALS
BODY MASS INDEX: 26.23 KG/M2 | DIASTOLIC BLOOD PRESSURE: 68 MMHG | HEART RATE: 97 BPM | TEMPERATURE: 101.5 F | WEIGHT: 138.8 LBS | SYSTOLIC BLOOD PRESSURE: 124 MMHG | OXYGEN SATURATION: 97 %

## 2018-03-02 DIAGNOSIS — J01.90 ACUTE SINUSITIS WITH SYMPTOMS > 10 DAYS: ICD-10-CM

## 2018-03-02 DIAGNOSIS — R05.9 COUGH: Primary | ICD-10-CM

## 2018-03-02 LAB
DEPRECATED S PYO AG THROAT QL EIA: NORMAL
FLUAV+FLUBV AG SPEC QL: NEGATIVE
FLUAV+FLUBV AG SPEC QL: NEGATIVE
SPECIMEN SOURCE: NORMAL
SPECIMEN SOURCE: NORMAL

## 2018-03-02 PROCEDURE — 99214 OFFICE O/P EST MOD 30 MIN: CPT | Performed by: FAMILY MEDICINE

## 2018-03-02 PROCEDURE — 87081 CULTURE SCREEN ONLY: CPT | Performed by: FAMILY MEDICINE

## 2018-03-02 PROCEDURE — 87880 STREP A ASSAY W/OPTIC: CPT | Performed by: FAMILY MEDICINE

## 2018-03-02 PROCEDURE — 87804 INFLUENZA ASSAY W/OPTIC: CPT | Performed by: FAMILY MEDICINE

## 2018-03-02 RX ORDER — AZITHROMYCIN 250 MG/1
250 TABLET, FILM COATED ORAL DAILY
Qty: 6 TABLET | Refills: 1 | Status: SHIPPED | OUTPATIENT
Start: 2018-03-02 | End: 2018-06-20

## 2018-03-02 NOTE — LETTER
[facility]  [address] [place] [address]  [phone]    RE:  Carly Gregg  [address] [address]  [phone] (home) [phone] (work)  March 2, 2018    TO WHOM IT MAY CONCERN:    Carly Gregg was seen on 3/2/2018.  Please excuse her until better due to illness, expect 3/6/18.    Cordially,      SUDHA ARRIAGA MD.

## 2018-03-02 NOTE — PROGRESS NOTES
SUBJECTIVE:                                                    Carly Gregg is 67 year old female   Chief Complaint   Patient presents with     Ent Problem     Symptoms for one week. Heavy chest, nasal congestion/drainage, dry cough, chest congestion. Sleeping in a chair, trouble sleeping, increased coughing if not breathing with shallow breaths. Has tried OTC cold medications, zyrtec, decongestants, cough drops to no effect.       Problem list and histories reviewed & adjusted, as indicated.  Additional history: as documented    Patient Active Problem List   Diagnosis     Contact dermatitis     CARDIOVASCULAR SCREENING; LDL GOAL LESS THAN 160     Advanced directives, counseling/discussion     S/P gastric bypass     Insomnia     Recurrent major depression in complete remission (H)     B12 deficiency anemia     Psychophysiological insomnia     Acute upper gastrointestinal bleeding     Failing dental implant     Past Surgical History:   Procedure Laterality Date     ESOPHAGOSCOPY, GASTROSCOPY, DUODENOSCOPY (EGD), COMBINED N/A 2016    Procedure: COMBINED ESOPHAGOSCOPY, GASTROSCOPY, DUODENOSCOPY (EGD);  Surgeon: Rivas Jade MD;  Location: [facility]     GASTRIC BYPASS       TONSILLECTOMY  age 29       Social History   Substance Use Topics     Smoking status: Never Smoker     Smokeless tobacco: Never Used     Alcohol use Yes      Comment: glass of wine monthly if that     Family History   Problem Relation Age of Onset     CANCER Mother      stomach cancer     Cardiovascular Mother      MI     DIABETES Mother      C.A.D. Maternal Grandfather      bypass     Genitourinary Problems Brother      kidney transplant     HEART DISEASE Sister      heart murmur     HEART DISEASE Sister      heart murmur     CANCER Father      bone and blood cancer  6 months later     Suicide Maternal Grandmother      Fransisco     Suicide Brother      Christian         Current Outpatient Prescriptions   Medication Sig Dispense  Refill     azithromycin (ZITHROMAX Z-JEFFY) 250 MG tablet Take 1 tablet (250 mg) by mouth daily Two tablets first day, then one tablet daily for four days 6 tablet 1     sertraline (ZOLOFT) 100 MG tablet Take 1 tablet (100 mg) by mouth daily 90 tablet 3     Folic Acid-Vit B6-Vit B12 2.5-25-1 MG TABS Take 1 tablet by mouth daily 90 tablet 3     Allergies   Allergen Reactions     Morphine Other (See Comments)     Morphine injection given for Hemorid infection.  Patient says she had trouble breathing and her heart raced.     Nsaids      Fainting and hospitalization.     Penicillins Unknown     Novocain [Procaine Hcl] Rash     Recent Labs   Lab Test  06/23/16 2010 03/02/16   0749  08/31/14   1623   02/21/13   0845  02/20/12   1358   LDL   --   91   --    --   110  99   HDL   --   91   --    --   69  82   TRIG   --   60   --    --   58  103   ALT  27   --   40   --    --    --    CR  0.67  0.79  0.75   < >  0.75   --    GFRESTIMATED  88  73  78   < >  79   --    GFRESTBLACK  >90   GFR Calc    88  >90   GFR Calc     < >  >90   --    POTASSIUM  4.3  4.5  3.9   < >  4.5   --    TSH   --    --    --    --   1.85   --     < > = values in this interval not displayed.      BP Readings from Last 3 Encounters:   03/02/18 124/68   04/24/17 105/71   06/30/16 112/69    Wt Readings from Last 3 Encounters:   03/02/18 138 lb 12.8 oz (63 kg)   06/22/17 131 lb (59.4 kg)   04/24/17 132 lb 9.6 oz (60.1 kg)         ROS:  Constitutional, HEENT, cardiovascular, pulmonary, gi and gu systems are negative, except as otherwise noted.    OBJECTIVE:                                                    /68  Pulse 97  Temp 101.5  F (38.6  C) (Tympanic)  Wt 138 lb 12.8 oz (63 kg)  SpO2 97%  BMI 26.23 kg/m2  GENERAL APPEARANCE ADULT: alert, appears ill, no distress, tired appearing, mouth breathing  HENT: right TM abnormal, dull, left TM abnormal, dull, throat/mouth:mild erythema, mucous membranes moist  NECK:  No adenopathy,masses or thyromegaly  RESP: lungs clear to auscultation   CV: tachycardia   PSYCH: mentation appears normal., affect and mood normal  Diagnostic Test Results:  Results for orders placed or performed in visit on 03/02/18   Influenza A/B antigen   Result Value Ref Range    Influenza A/B Agn Specimen Nasal     Influenza A Negative NEG^Negative    Influenza B Negative NEG^Negative   Strep, Rapid Screen   Result Value Ref Range    Specimen Description Throat     Rapid Strep A Screen       NEGATIVE: No Group A streptococcal antigen detected by immunoassay, await culture report.        ASSESSMENT/PLAN:                                                    1. Cough  - Influenza A/B antigen  - Strep, Rapid Screen  - Beta strep group A culture    2. Acute sinusitis with symptoms > 10 days  Viral vs bacterial.  Trial of antibiotics but if not effective viral infection and self limiting.  If effective and recurs will repeat, see patient instructions.  - azithromycin (ZITHROMAX Z-JEFFY) 250 MG tablet; Take 1 tablet (250 mg) by mouth daily Two tablets first day, then one tablet daily for four days  Dispense: 6 tablet; Refill: 1    Ayde Tatum MD  [facility]

## 2018-03-02 NOTE — NURSING NOTE
"Initial /68  Pulse 97  Temp 101.5  F (38.6  C) (Tympanic)  Wt 138 lb 12.8 oz (63 kg)  SpO2 97%  BMI 26.23 kg/m2 Estimated body mass index is 26.23 kg/(m^2) as calculated from the following:    Height as of 6/22/17: 5' 1\" (1.549 m).    Weight as of this encounter: 138 lb 12.8 oz (63 kg). .      "

## 2018-03-02 NOTE — MR AVS SNAPSHOT
After Visit Summary   3/2/2018    Carly Gregg    MRN: [id number]           Patient Information     Date Of Birth          1950        Visit Information        Provider Department      3/2/2018 2:00 PM Ayde Tatum MD [facility]        Today's Diagnoses     Cough    -  1    Acute sinusitis with symptoms > 10 days          Care Instructions     If symptoms resolve with the zithromax and then recur on day 6-8 repeat the zithromax.  If symptoms do not improve or do not recur don't repeat the zithromax.  Take only one tab a day if repeating the pac.  Zithromax is an antibiotic and works against bacteria.  If it didn't work then you don't have a bacterial infection, you have a viral infection and zpac or any other antibiotic won't work.  Recommend rest, fluids and other supportive cares so your immune system can clear the viral infection.  If your illness is getting worse see MD.    Symptomatic therapy suggested: push fluids, rest, gargle warm salt water, use vaporizer or mist needed , use acetaminophen, ibuprofen, decongestant of choice as needed and Return office visit if symptoms persist or worsen. Call or return to clinic prn if these symptoms worsen or fail to improve as anticipated.            Follow-ups after your visit        Who to contact     If you have questions or need follow up information about today's clinic visit or your schedule please contact [facility] directly at [phone].  Normal or non-critical lab and imaging results will be communicated to you by MyChart, letter or phone within 4 business days after the clinic has received the results. If you do not hear from us within 7 days, please contact the clinic through MyChart or phone. If you have a critical or abnormal lab result, we will notify you by phone as soon as possible.  Submit refill requests through [employer] or call your pharmacy and they will forward the refill request to us.  Please allow 3 business days for your refill to be completed.          Additional Information About Your Visit        MyChart Information     [employer] gives you secure access to your electronic health record. If you see a primary care provider, you can also send messages to your care team and make appointments. If you have questions, please call your primary care clinic.  If you do not have a primary care provider, please call [phone] and they will assist you.        Care EveryWhere ID     This is your Care EveryWhere ID. This could be used by other organizations to access your [facility] medical records  [id number]        Your Vitals Were     Pulse Temperature Pulse Oximetry BMI (Body Mass Index)          97 101.5  F (38.6  C) (Tympanic) 97% 26.23 kg/m2         Blood Pressure from Last 3 Encounters:   03/02/18 124/68   04/24/17 105/71   06/30/16 112/69    Weight from Last 3 Encounters:   03/02/18 138 lb 12.8 oz (63 kg)   06/22/17 131 lb (59.4 kg)   04/24/17 132 lb 9.6 oz (60.1 kg)              We Performed the Following     Beta strep group A culture     Influenza A/B antigen     Strep, Rapid Screen          Today's Medication Changes          These changes are accurate as of 3/2/18  2:44 PM.  If you have any questions, ask your nurse or doctor.               Start taking these medicines.        Dose/Directions    azithromycin 250 MG tablet   Commonly known as:  ZITHROMAX Z-JEFFY   Used for:  Acute sinusitis with symptoms > 10 days   Started by:  Ayde Tatum MD        Dose:  250 mg   Take 1 tablet (250 mg) by mouth daily Two tablets first day, then one tablet daily for four days   Quantity:  6 tablet   Refills:  1            Where to get your medicines      These medications were sent to [facility] [place], [place] - [address]  [address] [facility] [address]     Phone:  [phone]     azithromycin 250 MG tablet                Primary Care Provider Office Phone # Fax #     Ayde Tatum [phone]       [address] [facility] [address] [facility] [address]        Equal Access to Services     CRISTINA KEANE : Hadii aad ku hadjameygay Ott, shreyafiona lozada, christ quiquedeborah kiketorres, kailey amarilisin hayaan kikemusa barillas marybeth clifford. So [facility] [phone].    ATENCIÓN: Si habla español, tiene a hull disposición servicios gratuitos de asistencia lingüística. Llame al [phone].    We comply with applicable federal civil rights laws and [place] laws. We do not discriminate on the basis of race, color, national origin, age, disability, sex, sexual orientation, or gender identity.            Thank you!     Thank you for choosing [facility]  for your care. Our goal is always to provide you with excellent care. Hearing back from our patients is one way we can continue to improve our services. Please take a few minutes to complete the written survey that you may receive in the mail after your visit with us. Thank you!             Your Updated Medication List - Protect others around you: Learn how to safely use, store and throw away your medicines at www.disposemymeds.org.          This list is accurate as of 3/2/18  2:44 PM.  Always use your most recent med list.                   Brand Name Dispense Instructions for use Diagnosis    azithromycin 250 MG tablet    ZITHROMAX Z-JEFFY    6 tablet    Take 1 tablet (250 mg) by mouth daily Two tablets first day, then one tablet daily for four days    Acute sinusitis with symptoms > 10 days       Folic Acid-Vit B6-Vit B12 2.5-25-1 MG Tabs     90 tablet    Take 1 tablet by mouth daily    S/P gastric bypass       sertraline 100 MG tablet    ZOLOFT    90 tablet    Take 1 tablet (100 mg) by mouth daily    Recurrent major depression in complete remission (H)

## 2018-03-03 LAB
BACTERIA SPEC CULT: NORMAL
SPECIMEN SOURCE: NORMAL

## 2018-06-20 ENCOUNTER — OFFICE VISIT (OUTPATIENT)
Dept: FAMILY MEDICINE | Facility: CLINIC | Age: 68
End: 2018-06-20
Payer: COMMERCIAL

## 2018-06-20 VITALS
HEART RATE: 79 BPM | DIASTOLIC BLOOD PRESSURE: 70 MMHG | HEIGHT: 60 IN | SYSTOLIC BLOOD PRESSURE: 113 MMHG | WEIGHT: 130.9 LBS | TEMPERATURE: 97.4 F | BODY MASS INDEX: 25.7 KG/M2

## 2018-06-20 DIAGNOSIS — F33.42 RECURRENT MAJOR DEPRESSION IN COMPLETE REMISSION (H): ICD-10-CM

## 2018-06-20 DIAGNOSIS — Z12.11 SPECIAL SCREENING FOR MALIGNANT NEOPLASMS, COLON: ICD-10-CM

## 2018-06-20 DIAGNOSIS — Z00.00 ROUTINE GENERAL MEDICAL EXAMINATION AT A HEALTH CARE FACILITY: Primary | ICD-10-CM

## 2018-06-20 DIAGNOSIS — D51.9 ANEMIA DUE TO VITAMIN B12 DEFICIENCY, UNSPECIFIED B12 DEFICIENCY TYPE: ICD-10-CM

## 2018-06-20 DIAGNOSIS — Z23 ENCOUNTER FOR VACCINATION: ICD-10-CM

## 2018-06-20 DIAGNOSIS — Z98.84 S/P GASTRIC BYPASS: ICD-10-CM

## 2018-06-20 DIAGNOSIS — M27.69 FAILING DENTAL IMPLANT: ICD-10-CM

## 2018-06-20 DIAGNOSIS — Z12.31 ENCOUNTER FOR SCREENING MAMMOGRAM FOR BREAST CANCER: ICD-10-CM

## 2018-06-20 LAB
ANION GAP SERPL CALCULATED.3IONS-SCNC: 6 MMOL/L (ref 3–14)
BUN SERPL-MCNC: 6 MG/DL (ref 7–30)
CALCIUM SERPL-MCNC: 9.3 MG/DL (ref 8.5–10.1)
CHLORIDE SERPL-SCNC: 105 MMOL/L (ref 94–109)
CO2 SERPL-SCNC: 26 MMOL/L (ref 20–32)
CREAT SERPL-MCNC: 0.74 MG/DL (ref 0.52–1.04)
DEPRECATED CALCIDIOL+CALCIFEROL SERPL-MC: 41 UG/L (ref 20–75)
ERYTHROCYTE [DISTWIDTH] IN BLOOD BY AUTOMATED COUNT: 17.9 % (ref 10–15)
FERRITIN SERPL-MCNC: 8 NG/ML (ref 8–252)
FOLATE SERPL-MCNC: 35.5 NG/ML
GFR SERPL CREATININE-BSD FRML MDRD: 78 ML/MIN/1.7M2
GLUCOSE SERPL-MCNC: 84 MG/DL (ref 70–99)
HCT VFR BLD AUTO: 30.3 % (ref 35–47)
HGB BLD-MCNC: 9.6 G/DL (ref 11.7–15.7)
IRON SATN MFR SERPL: 9 % (ref 15–46)
IRON SERPL-MCNC: 43 UG/DL (ref 35–180)
MCH RBC QN AUTO: 27.7 PG (ref 26.5–33)
MCHC RBC AUTO-ENTMCNC: 31.7 G/DL (ref 31.5–36.5)
MCV RBC AUTO: 87 FL (ref 78–100)
PLATELET # BLD AUTO: 346 10E9/L (ref 150–450)
POTASSIUM SERPL-SCNC: 3.8 MMOL/L (ref 3.4–5.3)
RBC # BLD AUTO: 3.47 10E12/L (ref 3.8–5.2)
SODIUM SERPL-SCNC: 137 MMOL/L (ref 133–144)
TIBC SERPL-MCNC: 461 UG/DL (ref 240–430)
TSH SERPL DL<=0.005 MIU/L-ACNC: 1.81 MU/L (ref 0.4–4)
VIT B12 SERPL-MCNC: 677 PG/ML (ref 193–986)
WBC # BLD AUTO: 4.2 10E9/L (ref 4–11)

## 2018-06-20 PROCEDURE — 99213 OFFICE O/P EST LOW 20 MIN: CPT | Mod: 25 | Performed by: NURSE PRACTITIONER

## 2018-06-20 PROCEDURE — 82728 ASSAY OF FERRITIN: CPT | Performed by: NURSE PRACTITIONER

## 2018-06-20 PROCEDURE — 85027 COMPLETE CBC AUTOMATED: CPT | Performed by: NURSE PRACTITIONER

## 2018-06-20 PROCEDURE — 82607 VITAMIN B-12: CPT | Performed by: NURSE PRACTITIONER

## 2018-06-20 PROCEDURE — 90471 IMMUNIZATION ADMIN: CPT | Performed by: NURSE PRACTITIONER

## 2018-06-20 PROCEDURE — 82306 VITAMIN D 25 HYDROXY: CPT | Performed by: NURSE PRACTITIONER

## 2018-06-20 PROCEDURE — 83540 ASSAY OF IRON: CPT | Performed by: NURSE PRACTITIONER

## 2018-06-20 PROCEDURE — 82746 ASSAY OF FOLIC ACID SERUM: CPT | Performed by: NURSE PRACTITIONER

## 2018-06-20 PROCEDURE — 83550 IRON BINDING TEST: CPT | Performed by: NURSE PRACTITIONER

## 2018-06-20 PROCEDURE — 80048 BASIC METABOLIC PNL TOTAL CA: CPT | Performed by: NURSE PRACTITIONER

## 2018-06-20 PROCEDURE — 84443 ASSAY THYROID STIM HORMONE: CPT | Performed by: NURSE PRACTITIONER

## 2018-06-20 PROCEDURE — 99397 PER PM REEVAL EST PAT 65+ YR: CPT | Mod: 25 | Performed by: NURSE PRACTITIONER

## 2018-06-20 PROCEDURE — 36415 COLL VENOUS BLD VENIPUNCTURE: CPT | Performed by: NURSE PRACTITIONER

## 2018-06-20 PROCEDURE — 90732 PPSV23 VACC 2 YRS+ SUBQ/IM: CPT | Performed by: NURSE PRACTITIONER

## 2018-06-20 RX ORDER — SERTRALINE HYDROCHLORIDE 100 MG/1
100 TABLET, FILM COATED ORAL DAILY
Qty: 90 TABLET | Refills: 3 | Status: SHIPPED | OUTPATIENT
Start: 2018-06-20 | End: 2019-07-29

## 2018-06-20 NOTE — NURSING NOTE
Screening Questionnaire for Adult Immunization    Are you sick today?   No   Do you have allergies to medications, food, a vaccine component or latex?   No   Have you ever had a serious reaction after receiving a vaccination?   No   Do you have a long-term health problem with heart disease, lung disease, asthma, kidney disease, metabolic disease (e.g. diabetes), anemia, or other blood disorder?   No   Do you have cancer, leukemia, HIV/AIDS, or any other immune system problem?   No   In the past 3 months, have you taken medications that affect  your immune system, such as prednisone, other steroids, or anticancer drugs; drugs for the treatment of rheumatoid arthritis, Crohn s disease, or psoriasis; or have you had radiation treatments?   No   Have you had a seizure, or a brain or other nervous system problem?   No   During the past year, have you received a transfusion of blood or blood     products, or been given immune (gamma) globulin or antiviral drug?   No   For women: Are you pregnant or is there a chance you could become        pregnant during the next month?   No   Have you received any vaccinations in the past 4 weeks?   No     Immunization questionnaire answers were all negative.        Per orders of Dr. Salinas, injection of Pneumovax 23 given by TRISTA ARIAS. Patient instructed to remain in clinic for 15 minutes afterwards, and to report any adverse reaction to me immediately.       Screening performed by TRISTA ARIAS on 6/20/2018 at 8:23 AM.

## 2018-06-20 NOTE — MR AVS SNAPSHOT
After Visit Summary   6/20/2018    Carly Gregg    MRN: [id number]           Patient Information     Date Of Birth          1950        Visit Information        Provider Department      6/20/2018 7:40 AM Pam Salinas APRN [facility]        Today's Diagnoses     Routine general medical examination at a health care facility    -  1    Recurrent major depression in complete remission (H)        Failing dental implant        Anemia due to vitamin B12 deficiency, unspecified B12 deficiency type        S/P gastric bypass        Special screening for malignant neoplasms, colon        Encounter for vaccination        Encounter for screening mammogram for breast cancer          Care Instructions    Return FIT kit  Schedule mammogram [phone]          Preventive Health Recommendations  Female Ages 65 +    Yearly exam:     See your health care provider every year in order to  o Review health changes.   o Discuss preventive care.    o Review your medicines if your doctor has prescribed any.      You no longer need a yearly Pap test unless you've had an abnormal Pap test in the past 10 years. If you have vaginal symptoms, such as bleeding or discharge, be sure to talk with your provider about a Pap test.      Every 1 to 2 years, have a mammogram.  If you are over 69, talk with your health care provider about whether or not you want to continue having screening mammograms.      Every 10 years, have a colonoscopy. Or, have a yearly FIT test (stool test). These exams will check for colon cancer.       Have a cholesterol test every 5 years, or more often if your doctor advises it.       Have a diabetes test (fasting glucose) every three years. If you are at risk for diabetes, you should have this test more often.       At age 65, have a bone density scan (DEXA) to check for osteoporosis (brittle bone disease).    Shots:    Get a flu shot each year.    Get a tetanus shot  every 10 years.    Talk to your doctor about your pneumonia vaccines. There are now two you should receive - Pneumovax (PPSV 23) and Prevnar (PCV 13).    Talk to your doctor about the shingles vaccine.    Talk to your doctor about the hepatitis B vaccine.    Nutrition:     Eat at least 5 servings of fruits and vegetables each day.      Eat whole-grain bread, whole-wheat pasta and brown rice instead of white grains and rice.      Talk to your provider about Calcium and Vitamin D.     Lifestyle    Exercise at least 150 minutes a week (30 minutes a day, 5 days a week). This will help you control your weight and prevent disease.      Limit alcohol to one drink per day.      No smoking.       Wear sunscreen to prevent skin cancer.       See your dentist twice a year for an exam and cleaning.      See your eye doctor every 1 to 2 years to screen for conditions such as glaucoma, macular degeneration, cataracts, etc           Follow-ups after your visit        Future tests that were ordered for you today     Open Future Orders        Priority Expected Expires Ordered    *MA Screening Digital Bilateral Routine  6/20/2019 6/20/2018    Fecal colorectal cancer screen FIT Routine 7/11/2018 9/12/2018 6/20/2018            Who to contact     If you have questions or need follow up information about today's clinic visit or your schedule please contact [facility] directly at [phone].  Normal or non-critical lab and imaging results will be communicated to you by MyChart, letter or phone within 4 business days after the clinic has received the results. If you do not hear from us within 7 days, please contact the clinic through MyChart or phone. If you have a critical or abnormal lab result, we will notify you by phone as soon as possible.  Submit refill requests through [employer] or call your pharmacy and they will forward the refill request to us. Please allow 3 business days for your refill to be completed.           "Additional Information About Your Visit        MyChart Information     [employer] gives you secure access to your electronic health record. If you see a primary care provider, you can also send messages to your care team and make appointments. If you have questions, please call your primary care clinic.  If you do not have a primary care provider, please call [phone] and they will assist you.        Care EveryWhere ID     This is your Care EveryWhere ID. This could be used by other organizations to access your [facility] medical records  [id number]        Your Vitals Were     Pulse Temperature Height BMI (Body Mass Index)          79 97.4  F (36.3  C) (Oral) 5' 0.25\" (1.53 m) 25.35 kg/m2         Blood Pressure from Last 3 Encounters:   06/20/18 113/70   03/02/18 124/68   04/24/17 105/71    Weight from Last 3 Encounters:   06/20/18 130 lb 14.4 oz (59.4 kg)   03/02/18 138 lb 12.8 oz (63 kg)   06/22/17 131 lb (59.4 kg)              We Performed the Following     ADMIN: Vaccine, Initial (30731)     Basic metabolic panel     CBC with platelets     Folate     Pneumococcal vaccine 23 valent PPSV23  (Pneumovax) [50559]     TSH with free T4 reflex     Vitamin B12     Vitamin D Deficiency          Where to get your medicines      These medications were sent to [facility] [id number] [place] [address] [address] [facility] AT [facility] OF [address]  [address] [facility] [address]     Phone:  [phone]     Folic Acid-Vit B6-Vit B12 2.5-25-1 MG Tabs         Some of these will need a paper prescription and others can be bought over the counter.  Ask your nurse if you have questions.     Bring a paper prescription for each of these medications     sertraline 100 MG tablet          Primary Care Provider Office Phone # Fax #    Ayde Tatum [phone] [address] [facility] [address]        Equal Access to Services     CRISTINA KEANE AH: Hadii farhat Sykes " christ lozadaoliviasilvia jaimesann marie amarilisdeisy gonzalezalex ah. So [facility] [phone].    ATENCIÓN: Si anushka roblse, tiene a hull disposición servicios gratuitos de asistencia lingüística. Wes al [phone].    We comply with applicable federal civil rights laws and [place] laws. We do not discriminate on the basis of race, color, national origin, age, disability, sex, sexual orientation, or gender identity.            Thank you!     Thank you for choosing [facility]  for your care. Our goal is always to provide you with excellent care. Hearing back from our patients is one way we can continue to improve our services. Please take a few minutes to complete the written survey that you may receive in the mail after your visit with us. Thank you!             Your Updated Medication List - Protect others around you: Learn how to safely use, store and throw away your medicines at www.disposemymeds.org.          This list is accurate as of 6/20/18  8:08 AM.  Always use your most recent med list.                   Brand Name Dispense Instructions for use Diagnosis    Folic Acid-Vit B6-Vit B12 2.5-25-1 MG Tabs     90 tablet    Take 1 tablet by mouth daily    S/P gastric bypass       sertraline 100 MG tablet    ZOLOFT    90 tablet    Take 1 tablet (100 mg) by mouth daily    Recurrent major depression in complete remission (H)

## 2018-06-20 NOTE — PROGRESS NOTES
SUBJECTIVE:   Carly Gregg is a 67 year old female who presents for Preventive Visit.    Are you in the first 12 months of your Medicare Part B coverage?  No    Healthy Habits:    Do you get at least three servings of calcium containing foods daily (dairy, green leafy vegetables, etc.)? no, taking calcium and/or vitamin D supplement: 4000 units of vitamin D daily and calcium    Amount of exercise or daily activities, outside of work: active; nothing specific    Problems taking medications regularly No    Medication side effects: No    Have you had an eye exam in the past two years? yes    Do you see a dentist twice per year? yes    Do you have sleep apnea, excessive snoring or daytime drowsiness?no      Ability to successfully perform activities of daily living: Yes, no assistance needed    Home safety:  discussed     Hearing impairment: No    Fall risk:  Fallen 2 or more times in the past year?: No  Any fall with injury in the past year?: No      COGNITIVE SCREEN  1) Repeat 3 items (Banana, Sunrise, Chair)    2) Clock draw: NORMAL  3) 3 item recall: Recalls 3 objects  Results: 3 items recalled: COGNITIVE IMPAIRMENT LESS LIKELY    Mini-CogTM Copyright S Ton. Licensed by the author for use in [facility]; reprinted with permission (sokwesi@[facility]). All rights reserved.          Dentist is concerned about failed dental implants  Wants some blood levels checked  She is taking 5000 units of vitamin D daily plus calcium        Reviewed and updated as needed this visit by clinical staff  Tobacco  Allergies         Reviewed and updated as needed this visit by Provider        Social History   Substance Use Topics     Smoking status: Never Smoker     Smokeless tobacco: Never Used     Alcohol use Yes      Comment: Maybe a couple glasses of wine every couple of months       If you drink alcohol do you typically have >3 drinks per day or >7 drinks per week? No                        Today's PHQ-2 Score:  "  PHQ-2 ( 1999 Pfizer) 6/20/2018 4/18/2018   Q1: Little interest or pleasure in doing things 0 0   Q2: Feeling down, depressed or hopeless 0 0   PHQ-2 Score 0 0   Q1: Little interest or pleasure in doing things - Not at all   Q2: Feeling down, depressed or hopeless - Not at all   PHQ-2 Score - 0       Do you feel safe in your environment - Yes    Do you have a Health Care Directive?: Yes: Patient states has Advance Directive and will bring in a copy to clinic.    Current providers sharing in care for this patient include:   Patient Care Team:  Ayde Tatum MD as PCP - General    The following health maintenance items are reviewed in Epic and correct as of today:  Health Maintenance   Topic Date Due     DEPRESSION ACTION PLAN Q1 YR  09/07/1969     PHQ-9 Q6 MONTHS  10/24/2017     MAMMO SCREEN Q2 YR (SYSTEM ASSIGNED)  02/28/2018     FALL RISK ASSESSMENT  04/24/2018     PNEUMOCOCCAL (2 of 2 - PPSV23) 04/24/2018     FIT Q1 YR  04/28/2018     INFLUENZA VACCINE (Season Ended) 09/01/2018     ADVANCE DIRECTIVE PLANNING Q5 YRS  02/27/2020     LIPID SCREEN Q5 YR FEMALE (SYSTEM ASSIGNED)  03/02/2021     TETANUS IMMUNIZATION (SYSTEM ASSIGNED)  10/18/2021     DEXA SCAN SCREENING (SYSTEM ASSIGNED)  Completed     HEPATITIS C SCREENING  Completed             ROS:  Constitutional, HEENT, cardiovascular, pulmonary, GI, , musculoskeletal, neuro, skin, endocrine and psych systems are negative, except as otherwise noted.    OBJECTIVE:   /70 (BP Location: Left arm)  Pulse 79  Temp 97.4  F (36.3  C) (Oral)  Ht 5' 0.25\" (1.53 m)  Wt 130 lb 14.4 oz (59.4 kg)  BMI 25.35 kg/m2 Estimated body mass index is 25.35 kg/(m^2) as calculated from the following:    Height as of this encounter: 5' 0.25\" (1.53 m).    Weight as of this encounter: 130 lb 14.4 oz (59.4 kg).  EXAM:   GENERAL APPEARANCE: healthy, alert and no distress  EYES: Eyes grossly normal to inspection, PERRL and conjunctivae and sclerae normal  HENT: ear canals and " TM's normal, nose and mouth without ulcers or lesions, oropharynx clear and oral mucous membranes moist  NECK: no adenopathy, no asymmetry, masses, or scars and thyroid normal to palpation  RESP: lungs clear to auscultation - no rales, rhonchi or wheezes  BREAST: normal without masses, tenderness or nipple discharge and no palpable axillary masses or adenopathy  CV: regular rate and rhythm, normal S1 S2, no S3 or S4, no murmur, click or rub, no peripheral edema and peripheral pulses strong  ABDOMEN: soft, nontender, no hepatosplenomegaly, no masses and bowel sounds normal   (female): normal female external genitalia, normal urethral meatus and bimanual exam without masses  MS: no musculoskeletal defects are noted and gait is age appropriate without ataxia  SKIN: no suspicious lesions or rashes  NEURO: Normal strength and tone, sensory exam grossly normal, mentation intact and speech normal  PSYCH: mentation appears normal and affect normal/bright    ASSESSMENT / PLAN:       ICD-10-CM    1. Routine general medical examination at a health care facility Z00.00    2. Recurrent major depression in complete remission (H) F33.42 Well controlled.  Continue sertraline (ZOLOFT) 100 MG tablet     3. Failing dental implant M27.69 Per patient's dental surgeon, he is unclear why her dental implants are failing.  Lab work up today:  TSH with free T4 reflex     CBC with platelets     Basic metabolic panel     Vitamin D Deficiency     Vitamin B12     Folate    If labs are normal, will need to consider treating her osteoporosis (DEXA one year ago) with a bisphosphonate.      4. Anemia due to vitamin B12 deficiency, unspecified B12 deficiency type D51.9    5. S/P gastric bypass Z98.84 Folic Acid-Vit B6-Vit B12 2.5-25-1 MG TABS   6. Special screening for malignant neoplasms, colon Z12.11 Fecal colorectal cancer screen FIT   7. Encounter for vaccination Z23 Pneumococcal vaccine 23 valent PPSV23  (Pneumovax) [13634]     ADMIN: Vaccine,  "Initial (54792)   8. Encounter for screening mammogram for breast cancer Z12.31 *MA Screening Digital Bilateral       End of Life Planning:  Patient currently has an advanced directive: Yes.  Practitioner is supportive of decision.    COUNSELING:  Reviewed preventive health counseling, as reflected in patient instructions        Estimated body mass index is 25.35 kg/(m^2) as calculated from the following:    Height as of this encounter: 5' 0.25\" (1.53 m).    Weight as of this encounter: 130 lb 14.4 oz (59.4 kg).       reports that she has never smoked. She has never used smokeless tobacco.      Appropriate preventive services were discussed with this patient, including applicable screening as appropriate for cardiovascular disease, diabetes, osteopenia/osteoporosis, and glaucoma.  As appropriate for age/gender, discussed screening for colorectal cancer, prostate cancer, breast cancer, and cervical cancer. Checklist reviewing preventive services available has been given to the patient.    Reviewed patients plan of care and provided an AVS. The Basic Care Plan (routine screening as documented in Health Maintenance) for Carly meets the Care Plan requirement. This Care Plan has been established and reviewed with the Patient.    LUCIANA Jesus [facility]  "

## 2018-06-20 NOTE — PATIENT INSTRUCTIONS
Return FIT kit  Schedule mammogram [phone]          Preventive Health Recommendations  Female Ages 65 +    Yearly exam:     See your health care provider every year in order to  o Review health changes.   o Discuss preventive care.    o Review your medicines if your doctor has prescribed any.      You no longer need a yearly Pap test unless you've had an abnormal Pap test in the past 10 years. If you have vaginal symptoms, such as bleeding or discharge, be sure to talk with your provider about a Pap test.      Every 1 to 2 years, have a mammogram.  If you are over 69, talk with your health care provider about whether or not you want to continue having screening mammograms.      Every 10 years, have a colonoscopy. Or, have a yearly FIT test (stool test). These exams will check for colon cancer.       Have a cholesterol test every 5 years, or more often if your doctor advises it.       Have a diabetes test (fasting glucose) every three years. If you are at risk for diabetes, you should have this test more often.       At age 65, have a bone density scan (DEXA) to check for osteoporosis (brittle bone disease).    Shots:    Get a flu shot each year.    Get a tetanus shot every 10 years.    Talk to your doctor about your pneumonia vaccines. There are now two you should receive - Pneumovax (PPSV 23) and Prevnar (PCV 13).    Talk to your doctor about the shingles vaccine.    Talk to your doctor about the hepatitis B vaccine.    Nutrition:     Eat at least 5 servings of fruits and vegetables each day.      Eat whole-grain bread, whole-wheat pasta and brown rice instead of white grains and rice.      Talk to your provider about Calcium and Vitamin D.     Lifestyle    Exercise at least 150 minutes a week (30 minutes a day, 5 days a week). This will help you control your weight and prevent disease.      Limit alcohol to one drink per day.      No smoking.       Wear sunscreen to prevent skin cancer.       See your dentist  twice a year for an exam and cleaning.      See your eye doctor every 1 to 2 years to screen for conditions such as glaucoma, macular degeneration, cataracts, etc

## 2018-06-21 ASSESSMENT — PATIENT HEALTH QUESTIONNAIRE - PHQ9: SUM OF ALL RESPONSES TO PHQ QUESTIONS 1-9: 1

## 2018-06-29 DIAGNOSIS — Z12.11 SPECIAL SCREENING FOR MALIGNANT NEOPLASMS, COLON: Primary | ICD-10-CM

## 2018-06-29 PROCEDURE — 82274 ASSAY TEST FOR BLOOD FECAL: CPT | Performed by: NURSE PRACTITIONER

## 2018-07-01 LAB — HEMOCCULT STL QL IA: POSITIVE

## 2018-07-02 ENCOUNTER — TELEPHONE (OUTPATIENT)
Dept: FAMILY MEDICINE | Facility: CLINIC | Age: 68
End: 2018-07-02

## 2018-07-02 DIAGNOSIS — Z12.11 SCREENING FOR COLON CANCER: Primary | ICD-10-CM

## 2018-07-02 DIAGNOSIS — R19.5 POSITIVE FIT (FECAL IMMUNOCHEMICAL TEST): ICD-10-CM

## 2018-07-02 DIAGNOSIS — R19.5 POSITIVE FIT (FECAL IMMUNOCHEMICAL TEST): Primary | ICD-10-CM

## 2018-07-05 ENCOUNTER — ALLIED HEALTH/NURSE VISIT (OUTPATIENT)
Dept: FAMILY MEDICINE | Facility: CLINIC | Age: 68
End: 2018-07-05
Payer: COMMERCIAL

## 2018-07-05 ENCOUNTER — HOSPITAL ENCOUNTER (OUTPATIENT)
Dept: MAMMOGRAPHY | Facility: CLINIC | Age: 68
Discharge: HOME | End: 2018-07-05
Attending: NURSE PRACTITIONER | Admitting: NURSE PRACTITIONER
Payer: COMMERCIAL

## 2018-07-05 DIAGNOSIS — Z23 NEED FOR VACCINATION: Primary | ICD-10-CM

## 2018-07-05 DIAGNOSIS — Z12.31 ENCOUNTER FOR SCREENING MAMMOGRAM FOR BREAST CANCER: ICD-10-CM

## 2018-07-05 PROCEDURE — 99207 ZZC NO CHARGE NURSE ONLY: CPT

## 2018-07-05 PROCEDURE — 90750 HZV VACC RECOMBINANT IM: CPT

## 2018-07-05 PROCEDURE — 77067 SCR MAMMO BI INCL CAD: CPT

## 2018-07-05 PROCEDURE — 90471 IMMUNIZATION ADMIN: CPT

## 2018-07-05 NOTE — NURSING NOTE
"Chief Complaint   Patient presents with     Imm/Inj     Shingrix (clinic provided) administered today.       Initial There were no vitals taken for this visit. Estimated body mass index is 25.35 kg/(m^2) as calculated from the following:    Height as of 6/20/18: 5' 0.25\" (1.53 m).    Weight as of 6/20/18: 130 lb 14.4 oz (59.4 kg).    Medication Reconciliation:  unable or not appropriate to perform    Payal Nur CMA ([facility])    Screening Questionnaire for Adult Immunization    Are you sick today?   No   Do you have allergies to medications, food, a vaccine component or latex?   Yes   Have you ever had a serious reaction after receiving a vaccination?   No   Do you have a long-term health problem with heart disease, lung disease, asthma, kidney disease, metabolic disease (e.g. diabetes), anemia, or other blood disorder?   Yes   Do you have cancer, leukemia, HIV/AIDS, or any other immune system problem?   No   In the past 3 months, have you taken medications that affect  your immune system, such as prednisone, other steroids, or anticancer drugs; drugs for the treatment of rheumatoid arthritis, Crohn s disease, or psoriasis; or have you had radiation treatments?   No   Have you had a seizure, or a brain or other nervous system problem?   No   During the past year, have you received a transfusion of blood or blood     products, or been given immune (gamma) globulin or antiviral drug?   No   For women: Are you pregnant or is there a chance you could become        pregnant during the next month?   No   Have you received any vaccinations in the past 4 weeks?   Yes     Immunization questionnaire was positive for at least one answer.  Answers that were positive were answered accordingly and patient was able to receive vaccine.        Per orders of Dr. Wyatt, injection of SHINGRIX given by Payal Nur. Patient instructed to remain in clinic for 15 minutes afterwards, and to report any adverse reaction to me " immediately.       Screening performed by Payal Nur on 7/5/2018 at 8:40 AM.

## 2018-07-05 NOTE — MR AVS SNAPSHOT
After Visit Summary   7/5/2018    Carly Gregg    MRN: [id number]           Patient Information     Date Of Birth          1950        Visit Information        Provider Department      7/5/2018 8:30 AM [place] FP/IM CMA/LPN [facility]        Today's Diagnoses     Need for vaccination    -  1       Follow-ups after your visit        Who to contact     If you have questions or need follow up information about today's clinic visit or your schedule please contact [facility] directly at [phone].  Normal or non-critical lab and imaging results will be communicated to you by [employer], letter or phone within 4 business days after the clinic has received the results. If you do not hear from us within 7 days, please contact the clinic through [employer] or phone. If you have a critical or abnormal lab result, we will notify you by phone as soon as possible.  Submit refill requests through [employer] or call your pharmacy and they will forward the refill request to us. Please allow 3 business days for your refill to be completed.          Additional Information About Your Visit        MyChart Information     [employer] gives you secure access to your electronic health record. If you see a primary care provider, you can also send messages to your care team and make appointments. If you have questions, please call your primary care clinic.  If you do not have a primary care provider, please call [phone] and they will assist you.        Care EveryWhere ID     This is your Care EveryWhere ID. This could be used by other organizations to access your [facility] medical records  [id number]         Blood Pressure from Last 3 Encounters:   06/20/18 113/70   03/02/18 124/68   04/24/17 105/71    Weight from Last 3 Encounters:   06/20/18 130 lb 14.4 oz (59.4 kg)   03/02/18 138 lb 12.8 oz (63 kg)   06/22/17 131 lb (59.4 kg)              We Performed the Following     1st  Administration   [29742]     STONE [78130]        Primary Care Provider Office Phone # Fax #    Ayde Luzmaria Tatum [phone] [address] [facility] [address]        Equal Access to Services     CRISTINA KEANE : Hadii wan morejon hadjameyo Soomaali, waaxda luqadaha, qaybta kaalmada adeegyada, kailey amarilisin hayjacin kikemusa webberlaureen clifford. So [facility] [phone].    ATENCIÓN: Si habla español, tiene a hull disposición servicios gratuitos de asistencia lingüística. Llame al [phone].    We comply with applicable federal civil rights laws and [place] laws. We do not discriminate on the basis of race, color, national origin, age, disability, sex, sexual orientation, or gender identity.            Thank you!     Thank you for choosing [facility]  for your care. Our goal is always to provide you with excellent care. Hearing back from our patients is one way we can continue to improve our services. Please take a few minutes to complete the written survey that you may receive in the mail after your visit with us. Thank you!             Your Updated Medication List - Protect others around you: Learn how to safely use, store and throw away your medicines at www.disposemymeds.org.          This list is accurate as of 7/5/18  8:42 AM.  Always use your most recent med list.                   Brand Name Dispense Instructions for use Diagnosis    Folic Acid-Vit B6-Vit B12 2.5-25-1 MG Tabs     90 tablet    Take 1 tablet by mouth daily    S/P gastric bypass       sertraline 100 MG tablet    ZOLOFT    90 tablet    Take 1 tablet (100 mg) by mouth daily    Recurrent major depression in complete remission (H)

## 2018-07-09 ENCOUNTER — TELEPHONE (OUTPATIENT)
Dept: FAMILY MEDICINE | Facility: CLINIC | Age: 68
End: 2018-07-09

## 2018-07-09 NOTE — TELEPHONE ENCOUNTER
Covering for PCP:  I wouldn't think the order would matter too much, but since it sounds like the colonoscopy may be the more simple of the procedures, I would be inclined to do that first and get it out of the way, but it's up to her.  If she doesn't want to wait on starting the dental procedure, she can get that scheduled first, but I would not recommend delaying the colonoscopy beyond when it is currently scheduled.   Thanks.    Jayro Garner MD

## 2018-07-09 NOTE — TELEPHONE ENCOUNTER
Provider covering for Dr. Tatum,    Patient calls wanting to know our opinion and advice on what she should do first.      1.  Patient had a +positive FIT test.  Last hgb 9.6 on June 20-18.   She is scheduled for a colonoscopy to follow up on that on 8/10/18.  2.  Patient has dental implants that hold her dentures in place.  She needs to have this fixed as they have failed and she needs major surgery done.  Bone grafting to get the new dental implants put in which takes about 4-6      months..  She has stitches and the dental surgeon opens up the mouth to the sinus area for the bone grafting.  She has some bleeding and swelling as well.  3.  Patient called specialty clinic who said for her to call her primary for our take on what procedure to have done first.  4.  Patient does not have any pain, difficulty eating, or speaking with the dental implants that are failing.  Just her dentures don't fit well.     Please advise. Ruth SINCLAIR RN

## 2018-07-09 NOTE — TELEPHONE ENCOUNTER
"Reason for Call:  Other pt question    Detailed comments: pt calling stating she has a colonoscopy scheduled for 8-10. She said she dental implants that are failing and they need to do surgery. She is going to get bone put in and she said she \"tends to bleed quite a bit after and if wondering if when she should get them done, before or after her colonoscopy?    Phone Number Patient can be reached at: Work number on file:  [phone] (work)    Best Time: day    Can we leave a detailed message on this number? YES    Call taken on 7/9/2018 at 10:12 AM by Brianda Downs      "

## 2018-08-01 ENCOUNTER — DOCUMENTATION ONLY (OUTPATIENT)
Dept: OTHER | Facility: CLINIC | Age: 68
End: 2018-08-01

## 2018-08-09 ENCOUNTER — ANESTHESIA EVENT (OUTPATIENT)
Dept: GASTROENTEROLOGY | Facility: CLINIC | Age: 68
End: 2018-08-09
Payer: COMMERCIAL

## 2018-08-09 NOTE — ANESTHESIA PREPROCEDURE EVALUATION
Anesthesia Evaluation     . Pt has had prior anesthetic. Type: General and MAC    No history of anesthetic complications          ROS/MED HX    ENT/Pulmonary:  - neg pulmonary ROS     Neurologic:  - neg neurologic ROS     Cardiovascular:  - neg cardiovascular ROS       METS/Exercise Tolerance:  >4 METS   Hematologic:     (+) Anemia, -      Musculoskeletal:  - neg musculoskeletal ROS       GI/Hepatic:     (+) Other GI/Hepatic hx gi bleed      Renal/Genitourinary:  - ROS Renal section negative       Endo:  - neg endo ROS       Psychiatric:     (+) psychiatric history other (comment)      Infectious Disease:  - neg infectious disease ROS       Malignancy:      - no malignancy   Other:    - neg other ROS                 Physical Exam  Normal systems: cardiovascular, pulmonary and dental    Airway   Mallampati: II  TM distance: >3 FB  Neck ROM: full    Dental     Cardiovascular   Rhythm and rate: regular and normal      Pulmonary    breath sounds clear to auscultation                    Anesthesia Plan      History & Physical Review  History and physical reviewed and following examination; no interval change.    ASA Status:  1 .        Plan for MAC with Propofol induction. Maintenance will be TIVA.  Reason for MAC:  Deep or markedly invasive procedure (G8)         Postoperative Care      Consents  Anesthetic plan, risks, benefits and alternatives discussed with:  Patient..                          .

## 2018-08-10 ENCOUNTER — NURSE TRIAGE (OUTPATIENT)
Dept: NURSING | Facility: CLINIC | Age: 68
End: 2018-08-10

## 2018-08-10 ENCOUNTER — SURGERY (OUTPATIENT)
Age: 68
End: 2018-08-10

## 2018-08-10 ENCOUNTER — ANESTHESIA (OUTPATIENT)
Dept: GASTROENTEROLOGY | Facility: CLINIC | Age: 68
End: 2018-08-10
Payer: COMMERCIAL

## 2018-08-10 ENCOUNTER — HOSPITAL ENCOUNTER (OUTPATIENT)
Facility: CLINIC | Age: 68
Discharge: HOME | End: 2018-08-10
Attending: SURGERY | Admitting: SURGERY
Payer: COMMERCIAL

## 2018-08-10 VITALS
SYSTOLIC BLOOD PRESSURE: 123 MMHG | OXYGEN SATURATION: 100 % | HEIGHT: 61 IN | TEMPERATURE: 97.9 F | DIASTOLIC BLOOD PRESSURE: 93 MMHG | BODY MASS INDEX: 24.55 KG/M2 | WEIGHT: 130 LBS | HEART RATE: 60 BPM | RESPIRATION RATE: 16 BRPM

## 2018-08-10 LAB — COLONOSCOPY: NORMAL

## 2018-08-10 PROCEDURE — 25000125 ZZHC RX 250: Performed by: SURGERY

## 2018-08-10 PROCEDURE — 25000128 H RX IP 250 OP 636: Performed by: SURGERY

## 2018-08-10 PROCEDURE — G0121 COLON CA SCRN NOT HI RSK IND: HCPCS | Performed by: SURGERY

## 2018-08-10 PROCEDURE — 37000009 ZZH ANESTHESIA TECHNICAL FEE, EACH ADDTL 15 MIN: Performed by: SURGERY

## 2018-08-10 PROCEDURE — 37000008 ZZH ANESTHESIA TECHNICAL FEE, 1ST 30 MIN: Performed by: SURGERY

## 2018-08-10 PROCEDURE — 45378 DIAGNOSTIC COLONOSCOPY: CPT | Performed by: SURGERY

## 2018-08-10 PROCEDURE — 25000128 H RX IP 250 OP 636: Performed by: NURSE ANESTHETIST, CERTIFIED REGISTERED

## 2018-08-10 RX ORDER — SODIUM CHLORIDE, SODIUM LACTATE, POTASSIUM CHLORIDE, CALCIUM CHLORIDE 600; 310; 30; 20 MG/100ML; MG/100ML; MG/100ML; MG/100ML
INJECTION, SOLUTION INTRAVENOUS CONTINUOUS
Status: DISCONTINUED | OUTPATIENT
Start: 2018-08-10 | End: 2018-08-10 | Stop reason: HOSPADM

## 2018-08-10 RX ORDER — PROPOFOL 10 MG/ML
INJECTION, EMULSION INTRAVENOUS CONTINUOUS PRN
Status: DISCONTINUED | OUTPATIENT
Start: 2018-08-10 | End: 2018-08-10

## 2018-08-10 RX ORDER — PROPOFOL 10 MG/ML
INJECTION, EMULSION INTRAVENOUS PRN
Status: DISCONTINUED | OUTPATIENT
Start: 2018-08-10 | End: 2018-08-10

## 2018-08-10 RX ORDER — ONDANSETRON 2 MG/ML
4 INJECTION INTRAMUSCULAR; INTRAVENOUS
Status: DISCONTINUED | OUTPATIENT
Start: 2018-08-10 | End: 2018-08-10 | Stop reason: HOSPADM

## 2018-08-10 RX ORDER — LIDOCAINE 40 MG/G
CREAM TOPICAL
Status: DISCONTINUED | OUTPATIENT
Start: 2018-08-10 | End: 2018-08-10 | Stop reason: HOSPADM

## 2018-08-10 RX ADMIN — PROPOFOL 150 MCG/KG/MIN: 10 INJECTION, EMULSION INTRAVENOUS at 08:49

## 2018-08-10 RX ADMIN — SODIUM CHLORIDE, POTASSIUM CHLORIDE, SODIUM LACTATE AND CALCIUM CHLORIDE: 600; 310; 30; 20 INJECTION, SOLUTION INTRAVENOUS at 07:51

## 2018-08-10 RX ADMIN — LIDOCAINE HYDROCHLORIDE 1 ML: 10 INJECTION, SOLUTION EPIDURAL; INFILTRATION; INTRACAUDAL; PERINEURAL at 07:52

## 2018-08-10 RX ADMIN — PROPOFOL 100 MG: 10 INJECTION, EMULSION INTRAVENOUS at 08:49

## 2018-08-10 RX ADMIN — LIDOCAINE HYDROCHLORIDE 40 MG: 10 INJECTION, SOLUTION EPIDURAL; INFILTRATION; INTRACAUDAL; PERINEURAL at 08:49

## 2018-08-10 NOTE — ANESTHESIA CARE TRANSFER NOTE
Patient: Carly Gregg    Procedure(s):  colonoscopy - Wound Class: II-Clean Contaminated    Diagnosis: positive fit test     diagnostic  Diagnosis Additional Information: No value filed.    Anesthesia Type:   MAC     Note:  Airway :Room Air  Patient transferred to:Phase II  Handoff Report: Identifed the Patient, Identified the Reponsible Provider, Reviewed the pertinent medical history, Discussed the surgical course, Reviewed Intra-OP anesthesia mangement and issues during anesthesia, Set expectations for post-procedure period and Allowed opportunity for questions and acknowledgement of understanding      Vitals: (Last set prior to Anesthesia Care Transfer)    CRNA VITALS  8/10/2018 0850 - 8/10/2018 0920      8/10/2018             Pulse: 68    SpO2: 100 %    EKG: NSR                Electronically Signed By: Himanshu Ness CRNA, APRN CRNA  August 10, 2018  9:20 AM

## 2018-08-10 NOTE — H&P
"  GI Pre-Procedure History & Physical    Calry Gregg MRN# [id number]   Age: 67 year old YOB: 1950      Date of Surgery: 8/10/2018  Location [facility]      Date of Exam 8/10/2018 Facility ([facility])            Active problem list:   Patient Active Problem List   Diagnosis     Contact dermatitis     CARDIOVASCULAR SCREENING; LDL GOAL LESS THAN 160     S/P gastric bypass     Insomnia     Recurrent major depression in complete remission (H)     B12 deficiency anemia     Psychophysiological insomnia     Acute upper gastrointestinal bleeding     Failing dental implant            Medications (include herbals and vitamins):   Any Plavix use in the last 7 days?  No     Current Facility-Administered Medications   Medication     lactated ringers infusion     lidocaine (LMX4) kit     lidocaine 1 % 1 mL     ondansetron (ZOFRAN) injection 4 mg     sodium chloride (PF) 0.9% PF flush 3 mL     sodium chloride (PF) 0.9% PF flush 3 mL             Allergies:      Allergies   Allergen Reactions     Morphine Other (See Comments)     Morphine injection given for Hemorid infection.  Patient says she had trouble breathing and her heart raced.     Nsaids      Fainting and hospitalization.     Penicillins Unknown     Novocain [Procaine Hcl] Rash     Can do lidocaine for iv start             Social History:     Social History   Substance Use Topics     Smoking status: Never Smoker     Smokeless tobacco: Never Used     Alcohol use Yes      Comment: Maybe a couple glasses of wine every couple of months            Physical Exam:   All vitals have been reviewed   Patient Vitals for the past 8 hrs:   BP Temp Temp src Heart Rate Resp SpO2 Height Weight   08/10/18 0750 132/78 97.9  F (36.6  C) Oral 82 18 100 % 1.549 m (5' 1\") 59 kg (130 lb)       Airway assessment:   Patient is able to open mouth wide  Patient is able to stick out tongue   Constitutional: Awake, alert, cooperative, no apparent distress, and appears " stated age.  Eyes: Pupils equal, round and reactive to light.  ENT: Normocephalic, sinuses nontender, external ears without lesions, oral pharynx with moist mucus membranes.  Neck: Supple, symmetrical, trachea midline, thyroid symmetric and non-tender.  Lungs: No increased work of breathing, good air exchange, clear to auscultation bilaterally, no crackles or wheezing.  Cardiovascular: Regular rate and rhythm, normal heart sound, and no murmur noted.  Abdomen: Normal bowel sounds, soft, non-distended, non-tender, no masses palpated.  Musculoskeletal: No cyanosis or edema.  Neurologic: Awake, alert, oriented to name, place and time.    Skin: No rashes, erythema, pallor, petechia or purpura.          Review of Systems:       E: NEGATIVE for vision changes or irritation  E/M: NEGATIVE for ear, mouth and throat problems  R: NEGATIVE for significant cough or SOB  CV: NEGATIVE for chest pain, palpitations or peripheral edema  GI: NEGATIVE for nausea, abdominal pain, heartburn, or change in bowel habits  : NEGATIVE for frequency, dysuria, or hematuria  M: NEGATIVE for significant arthralgias or myalgia  N: NEGATIVE for weakness, dizziness or paresthesias  H: NEGATIVE for bleeding problems           Lab / Radiology Results:   All laboratory data reviewed          Assessment:   Appropriately NPO  Chief complaint or anatomic assessment of involved area: colonoscopy, positive FIT. No FH polyps or colon cancer         Plan:   MAC Anesthesia     Risks, benefits, alternatives to procedure explained and consent obtained  P2 (patient with mild systemic disease)  Discharge from Phase 1 and / or Phase 2 recovery when patient meets criteria    I have reviewed the history and physical, lab finding(s), diagnostic data, medications, and the plan for sedation.  I have determined this patient to be an appropriate candidate for the planned sedation / procedure and have reassessed the patient immediately prior to sedation /  procedure.      Phillip Robbins MD

## 2018-08-10 NOTE — TELEPHONE ENCOUNTER
Additional Information    Negative: Drug overdose and nurse unable to answer question    Negative: Caller requesting information not related to medicine    Negative: Caller requesting a prescription for Strep throat and has a positive culture result    Negative: Rash while taking a medication or within 3 days of stopping it    Negative: Immunization reaction suspected    Negative: [1] Asthma and [2] having symptoms of asthma (cough, wheezing, etc)    Negative: MORE THAN A DOUBLE DOSE of a prescription or over-the-counter (OTC) drug    Negative: [1] DOUBLE DOSE (an extra dose or lesser amount) of over-the-counter (OTC) drug AND [2] any symptoms (e.g., dizziness, nausea, pain, sleepiness)    Negative: [1] DOUBLE DOSE (an extra dose or lesser amount) of prescription drug AND [2] any symptoms (e.g., dizziness, nausea, pain, sleepiness)    Negative: Took another person's prescription drug    Negative: Caller has NON-URGENT medication question about med that PCP prescribed and triager unable to answer question    Negative: Caller requesting a NON-URGENT new prescription or refill and triager unable to refill per unit policy    Negative: Caller has medication question about med not prescribed by PCP and triager unable to answer question (e.g., compatibility with other med, storage)    Negative: [1] DOUBLE DOSE (an extra dose or lesser amount) of over-the-counter (OTC) drug AND [2] NO symptoms (all triage questions negative)    Negative: [1] DOUBLE DOSE (an extra dose or lesser amount) of antibiotic drug AND [2] NO symptoms (all triage questions negative)    Caller has medication question only, adult not sick, and triager answers question    Protocols used: [facility]  Caller is scheduled for an colonoscopy this am and wants to know if she can sip on the Golytely due to it upsetting her stomach. Caller is a gastric bypass patient. Triager advised caller that it was ok for her to sip on it rather  than drink at all down fast and at once.

## 2018-08-10 NOTE — ANESTHESIA POSTPROCEDURE EVALUATION
Patient: Carly Gregg    Procedure(s):  colonoscopy - Wound Class: II-Clean Contaminated    Diagnosis:positive fit test     diagnostic  Diagnosis Additional Information: No value filed.    Anesthesia Type:  MAC    Note:  Anesthesia Post Evaluation    Patient location during evaluation: Bedside  Patient participation: Able to fully participate in evaluation  Level of consciousness: awake and alert  Pain management: adequate  Airway patency: patent  Cardiovascular status: acceptable  Respiratory status: acceptable  Hydration status: acceptable  PONV: none     Anesthetic complications: None          Last vitals:  Vitals:    08/10/18 0750   BP: 132/78   Resp: 18   Temp: 36.6  C (97.9  F)   SpO2: 100%         Electronically Signed By: Himanshu Ness CRNA, APRN CRNA  August 10, 2018  9:21 AM

## 2018-09-25 ENCOUNTER — TELEPHONE (OUTPATIENT)
Dept: FAMILY MEDICINE | Facility: CLINIC | Age: 68
End: 2018-09-25

## 2018-09-25 NOTE — TELEPHONE ENCOUNTER
Reason for Call:  Other med question    Detailed comments: Patient would like to talk to the nurse about a medication appropriate for use after dental surgery.    Phone Number Patient can be reached at: Work number on file:  [phone] (work)    Best Time: any    Can we leave a detailed message on this number? YES    Call taken on 9/25/2018 at 1:15 PM by Namita Torres

## 2018-09-25 NOTE — TELEPHONE ENCOUNTER
Patient informed of message below from provider.  Patient does not want to take Arnica supplement due to irritation of the stomach.   Per Micromedex:  GASTROINTESTINAL  GASTROINTESTINAL EFFECTS  1) Oral administration is considered to be potentially unsafe, and ingestion of parts of the whole plant or of arnica preparations has been reported to cause GASTROENTERITIS, STOMACH PAIN, DIARRHEA, and VOMITING (Malgorzata, 1998; Matt, 1997).    Patient informed of micromedex adverse effects warning. Patient does not want to take the Arnica, advised patient to further discuss this with oral surgeon. Patient verbalizes understanding, agrees to plan of care, and has no further questions.

## 2018-09-25 NOTE — TELEPHONE ENCOUNTER
"S-(situation): Invasive dental implant surgery and bone grafting procedure on upper right side, 10/2/18 in the morning.    B-(background): had procedure 4 years ago and ended up in hospital due to GI bleed from use of NSAIDs, 6/23/16, see admission information.   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    A-(assessment):   Dentist wants to know what pain medication is safe for patient to take following procedure? procedure was previously very painful. Dentist will prescribe pain medication but needs to know what is safe for patient. Dental provider prescribed Arnica tablets for the swelling and bruising following proocedure, patient has picked these up, asking if ok to take there (was supposed to take 4 days prior to surgery) due to severe bruising following, if not patient states she will just \"deal with the swelling and bruising\".       R-(recommendations): Routed to provider for recommendations. Need specifics for dentist.     May respond through [employer].    Home phone if after 5:30pm today.      "

## 2018-09-25 NOTE — TELEPHONE ENCOUNTER
Pain medication: should be safe to take any of the narcotics with tylenol like Norco or Percocet.      I am not familiar with Arnica, but in researching, it appears safe supplement to take prior to procedure.    Thanks,  Tim Cortez MD

## 2018-10-24 ENCOUNTER — ALLIED HEALTH/NURSE VISIT (OUTPATIENT)
Dept: FAMILY MEDICINE | Facility: CLINIC | Age: 68
End: 2018-10-24
Payer: COMMERCIAL

## 2018-10-24 DIAGNOSIS — Z23 NEED FOR VACCINATION: Primary | ICD-10-CM

## 2018-10-24 PROCEDURE — 90471 IMMUNIZATION ADMIN: CPT

## 2018-10-24 PROCEDURE — 90750 HZV VACC RECOMBINANT IM: CPT

## 2018-10-24 PROCEDURE — 99207 ZZC NO CHARGE NURSE ONLY: CPT

## 2018-10-24 NOTE — MR AVS SNAPSHOT
After Visit Summary   10/24/2018    Carly Gregg    MRN: [id number]           Patient Information     Date Of Birth          1950        Visit Information        Provider Department      10/24/2018 2:30 PM [place] [facility]/IM [facility]        Today's Diagnoses     Need for vaccination    -  1       Follow-ups after your visit        Who to contact     If you have questions or need follow up information about today's clinic visit or your schedule please contact [facility] directly at [phone].  Normal or non-critical lab and imaging results will be communicated to you by [employer], letter or phone within 4 business days after the clinic has received the results. If you do not hear from us within 7 days, please contact the clinic through [employer] or phone. If you have a critical or abnormal lab result, we will notify you by phone as soon as possible.  Submit refill requests through [employer] or call your pharmacy and they will forward the refill request to us. Please allow 3 business days for your refill to be completed.          Additional Information About Your Visit        MyChart Information     [employer] gives you secure access to your electronic health record. If you see a primary care provider, you can also send messages to your care team and make appointments. If you have questions, please call your primary care clinic.  If you do not have a primary care provider, please call [phone] and they will assist you.        Care EveryWhere ID     This is your Care EveryWhere ID. This could be used by other organizations to access your [facility] medical records  [id number]         Blood Pressure from Last 3 Encounters:   08/10/18 (!) 123/93   06/20/18 113/70   03/02/18 124/68    Weight from Last 3 Encounters:   08/10/18 130 lb (59 kg)   06/20/18 130 lb 14.4 oz (59.4 kg)   03/02/18 138 lb 12.8 oz (63 kg)              We Performed the Following     ADMIN 1st  VACCINE     ZOSTER VACCINE RECOMBINANT ADJUVANTED IM NJX        Primary Care Provider Office Phone # Fax #    Ayde Luzmaria Tatum [phone] [address] [facility] [address]        Equal Access to Services     CRISTINA KEANE : Hadii wan ku hadjameyo Soomaali, waaxda luqadaha, qaybta kaalmada adeegyada, waxann marie amarilisin josen jamshid akbarlaureen clifford. So [facility] [phone].    ATENCIÓN: Si habla español, tiene a hull disposición servicios gratuitos de asistencia lingüística. Llame al [phone].    We comply with applicable federal civil rights laws and [place] laws. We do not discriminate on the basis of race, color, national origin, age, disability, sex, sexual orientation, or gender identity.            Thank you!     Thank you for choosing [facility]  for your care. Our goal is always to provide you with excellent care. Hearing back from our patients is one way we can continue to improve our services. Please take a few minutes to complete the written survey that you may receive in the mail after your visit with us. Thank you!             Your Updated Medication List - Protect others around you: Learn how to safely use, store and throw away your medicines at www.disposemymeds.org.          This list is accurate as of 10/24/18  3:33 PM.  Always use your most recent med list.                   Brand Name Dispense Instructions for use Diagnosis    Folic Acid-Vit B6-Vit B12 2.5-25-1 MG Tabs     90 tablet    Take 1 tablet by mouth daily    S/P gastric bypass       sertraline 100 MG tablet    ZOLOFT    90 tablet    Take 1 tablet (100 mg) by mouth daily    Recurrent major depression in complete remission (H)

## 2018-10-24 NOTE — NURSING NOTE
Screening Questionnaire for Adult Immunization    Are you sick today?   No   Do you have allergies to medications, food, a vaccine component or latex?   Yes   Have you ever had a serious reaction after receiving a vaccination?   No   Do you have a long-term health problem with heart disease, lung disease, asthma, kidney disease, metabolic disease (e.g. diabetes), anemia, or other blood disorder?   No   Do you have cancer, leukemia, HIV/AIDS, or any other immune system problem?   No   In the past 3 months, have you taken medications that affect  your immune system, such as prednisone, other steroids, or anticancer drugs; drugs for the treatment of rheumatoid arthritis, Crohn s disease, or psoriasis; or have you had radiation treatments?   No   Have you had a seizure, or a brain or other nervous system problem?   No   During the past year, have you received a transfusion of blood or blood     products, or been given immune (gamma) globulin or antiviral drug?   No   For women: Are you pregnant or is there a chance you could become        pregnant during the next month?   No   Have you received any vaccinations in the past 4 weeks?   No     Immunization questionnaire was positive for at least one answer.  Ok per guidelines for Shingrix .        . Patient instructed to remain in clinic for 15 minutes afterwards, and to report any adverse reaction to me immediately.       Screening performed by Sukumar Reddy on 10/24/2018 at 3:12 PM.

## 2018-12-08 ENCOUNTER — NURSE TRIAGE (OUTPATIENT)
Dept: NURSING | Facility: CLINIC | Age: 68
End: 2018-12-08

## 2018-12-08 NOTE — TELEPHONE ENCOUNTER
For the past 2-3 weeks she has had upper back pain between her spine and shoulder on one side. She has seen a chiropractor and a massage therapist. The is a lump in the area of discomfort that was there before being seen for this pain. No redness, warmth, fever or drainage. Using heat or cold on the area does help, until she takes it off. When I advised she be seen at urgent care today, she requested and appt on Monday in the clinic. She knows she can go to  or ED this weekend as needed.     Eri Bradley RN  [facility] RN  Lung Nodule and ED Lab Result F/u RN      Reason for Disposition    [1] MODERATE back pain (e.g., interferes with normal activities) AND [2] present > 3 days    Additional Information    Negative: Passed out (i.e., lost consciousness, collapsed and was not responding)    Negative: Shock suspected (e.g., cold/pale/clammy skin, too weak to stand, low BP, rapid pulse)    Negative: Sounds like a life-threatening emergency to the triager    Negative: Major injury to the back (e.g., MVA, fall > 10 feet or 3 meters, penetrating injury, etc.)    Negative: Followed a tailbone injury    Negative: [1] Pain in the upper back over the ribs (rib cage) AND [2] radiates (travels, goes) into chest    Negative: [1] Pain in the upper back over the ribs (rib cage) AND [2] worsened by coughing (or clearly increases with breathing)    Negative: Back pain during pregnancy    Negative: Pain mainly in flank (i.e., in the side, over the lower ribs or just below the ribs)    Negative: [1] SEVERE back pain (e.g., excruciating) AND [2] sudden onset AND [3] age > 60    Negative: [1] Unable to urinate (or only a few drops) > 4 hours AND     [2] bladder feels very full (e.g., palpable bladder or strong urge to urinate)    Negative: [1] Urinary or bowel incontinence (i.e., loss of bladder or bowel control) AND [2] new onset    Negative: Numbness in groin or rectal area (i.e., loss of sensation)    Negative: [1]  "SEVERE abdominal pain AND [2] present > 1 hour    Negative: [1] Abdominal pain AND [2] age > 60    Negative: Weakness of a leg or foot (e.g., unable to bear weight, dragging foot)    Negative: Unable to walk    Negative: Patient sounds very sick or weak to the triager    Negative: [1] SEVERE back pain (e.g., excruciating, unable to do any normal activities) AND [2] not improved 2 hours after pain medicine    Negative: [1] Pain radiates into the thigh or further down the leg AND [2] both legs    Negative: [1] Fever > 100.5 F (38.1 C) AND [2] flank pain (i.e., in side, below ribs and above hip)    Negative: [1] Pain or burning with urination AND [2] flank pain (i.e., in side, below ribs and above hip)    Negative: Numbness in a leg or foot (i.e., loss of sensation)    Negative: High-risk adult (e.g., history of cancer, HIV, or IV drug abuse)    Negative: [1] Fever AND [2] no symptoms of UTI  (Exception: has generalized muscle pains, not localized back pain)    Negative: Rash in same area as pain (may be described as \"small blisters\")    Negative: Blood in urine (red, pink, or tea-colored)    Protocols used: BACK PAIN-ADULT-    "

## 2018-12-13 RX ORDER — CHLORHEXIDINE GLUCONATE ORAL RINSE 1.2 MG/ML
SOLUTION DENTAL
Refills: 0 | COMMUNITY
Start: 2018-10-02 | End: 2018-12-14

## 2018-12-14 ENCOUNTER — OFFICE VISIT (OUTPATIENT)
Dept: FAMILY MEDICINE | Facility: CLINIC | Age: 68
End: 2018-12-14
Payer: COMMERCIAL

## 2018-12-14 VITALS
SYSTOLIC BLOOD PRESSURE: 114 MMHG | OXYGEN SATURATION: 98 % | DIASTOLIC BLOOD PRESSURE: 68 MMHG | RESPIRATION RATE: 16 BRPM | WEIGHT: 132 LBS | HEART RATE: 69 BPM | BODY MASS INDEX: 24.92 KG/M2 | TEMPERATURE: 96.8 F | HEIGHT: 61 IN

## 2018-12-14 DIAGNOSIS — L73.9 FOLLICULITIS: ICD-10-CM

## 2018-12-14 DIAGNOSIS — S46.811A STRAIN OF RIGHT TRAPEZIUS MUSCLE, INITIAL ENCOUNTER: ICD-10-CM

## 2018-12-14 PROCEDURE — 99214 OFFICE O/P EST MOD 30 MIN: CPT | Performed by: FAMILY MEDICINE

## 2018-12-14 ASSESSMENT — MIFFLIN-ST. JEOR: SCORE: 1071.13

## 2018-12-14 ASSESSMENT — ANXIETY QUESTIONNAIRES
5. BEING SO RESTLESS THAT IT IS HARD TO SIT STILL: NOT AT ALL
6. BECOMING EASILY ANNOYED OR IRRITABLE: NOT AT ALL
GAD7 TOTAL SCORE: 0
7. FEELING AFRAID AS IF SOMETHING AWFUL MIGHT HAPPEN: NOT AT ALL
1. FEELING NERVOUS, ANXIOUS, OR ON EDGE: NOT AT ALL
2. NOT BEING ABLE TO STOP OR CONTROL WORRYING: NOT AT ALL
IF YOU CHECKED OFF ANY PROBLEMS ON THIS QUESTIONNAIRE, HOW DIFFICULT HAVE THESE PROBLEMS MADE IT FOR YOU TO DO YOUR WORK, TAKE CARE OF THINGS AT HOME, OR GET ALONG WITH OTHER PEOPLE: NOT DIFFICULT AT ALL
3. WORRYING TOO MUCH ABOUT DIFFERENT THINGS: NOT AT ALL

## 2018-12-14 ASSESSMENT — PATIENT HEALTH QUESTIONNAIRE - PHQ9
5. POOR APPETITE OR OVEREATING: NOT AT ALL
SUM OF ALL RESPONSES TO PHQ QUESTIONS 1-9: 0

## 2018-12-14 NOTE — PATIENT INSTRUCTIONS
Thank you for choosing [facility].  You may be receiving a survey in the mail from Lorenzo Nettles regarding your visit today.  Please take a few minutes to complete and return the survey to let us know how we are doing.      If you have questions or concerns, please contact us via ANPI or you can contact your care team at [phone].    Our Clinic hours are:  Monday 6:40 am  to 7:00 pm  Tuesday -Friday 6:40 am to 5:00 pm    The [place] outpatient lab hours are:  Monday - Friday 6:10 am to 4:45 pm  Saturdays 7:00 am to 11:00 am  Appointments are required, call [phone]    If you have clinical questions after hours or would like to schedule an appointment,  call the clinic at [phone].      (L73.9) Folliculitis  Comment:   Plan: use the hypoallergenic products, with no perfume or coloring. Do not use cortisone cream.   Consider the Bacitracin twice daily for the ointment, for 4-5 days. Rarely, an oral antibiotic would be needed.     (S46.599S) Strain of right trapezius muscle, initial encounter  Comment:   Plan: modify the use of the neck muscles and take 1-2 minute breaks for the neck every 30-60 minutes.   Ice first and then heat and massage. Tylenol is OK. Work with your chiropractor.

## 2018-12-14 NOTE — PROGRESS NOTES
"  SUBJECTIVE:   Carly Gregg is a 68 year old female who presents to clinic today for the following health issues:      SORES       Duration: 3 weeks    Description (location/character/radiation): Right neck-shoulder blade area.  States she has sores and the area is painful.    Intensity:  moderate, severe at times.    Accompanying signs and symptoms: Feels like there is a lump in the area.  No fever chills.    History (similar episodes/previous evaluation): She does go regularly to a Chiropractor and that has not helped.  Massage is not helping.     Precipitating or alleviating factors: Certain movements with tiling her head back and to the side.  Being at work-tired by the end of the day due to symptoms.    Therapies tried and outcome: Tylenol-she can make it through work when taking this.         Current Outpatient Medications:      Folic Acid-Vit B6-Vit B12 2.5-25-1 MG TABS, Take 1 tablet by mouth daily, Disp: 90 tablet, Rfl: 3     sertraline (ZOLOFT) 100 MG tablet, Take 1 tablet (100 mg) by mouth daily, Disp: 90 tablet, Rfl: 3    Patient Active Problem List   Diagnosis     Contact dermatitis     CARDIOVASCULAR SCREENING; LDL GOAL LESS THAN 160     S/P gastric bypass     Insomnia     Recurrent major depression in complete remission (H)     B12 deficiency anemia     Psychophysiological insomnia     Acute upper gastrointestinal bleeding     Failing dental implant       Blood pressure 114/68, pulse 69, temperature 96.8  F (36  C), temperature source Tympanic, resp. rate 16, height 1.549 m (5' 1\"), weight 59.9 kg (132 lb), SpO2 98 %, not currently breastfeeding.    Exam:  GENERAL APPEARANCE: healthy, alert and no distress  NECK: no adenopathy, no asymmetry, masses, or scars and thyroid normal to palpation;  There is some pain with stretching of the right trapezius; the midline of the cervical and upper thoracic spine   Are not tender.   MS: the right trapezius is tight and tender.   SKIN: there are 10-15 " follicles with redness and scabs and some triana on the upper back.       (L73.9) Folliculitis  Comment:   Plan: use the hypoallergenic products, with no perfume or coloring. Do not use cortisone cream.   Consider the Bacitracin twice daily for the ointment, for 4-5 days. Rarely, an oral antibiotic would be needed.     (S40.652Z) Strain of right trapezius muscle, initial encounter  Comment:   Plan: modify the use of the neck muscles and take 1-2 minute breaks for the neck every 30-60 minutes.   Ice first and then heat and massage. Tylenol is OK. Work with your chiropractor.     Liang Robledo

## 2018-12-15 ASSESSMENT — ANXIETY QUESTIONNAIRES: GAD7 TOTAL SCORE: 0

## 2018-12-20 ENCOUNTER — MYC MEDICAL ADVICE (OUTPATIENT)
Dept: FAMILY MEDICINE | Facility: CLINIC | Age: 68
End: 2018-12-20

## 2018-12-20 DIAGNOSIS — L73.9 FOLLICULITIS: Primary | ICD-10-CM

## 2018-12-21 RX ORDER — AZITHROMYCIN 250 MG/1
TABLET, FILM COATED ORAL
Qty: 6 TABLET | Refills: 0 | Status: SHIPPED | OUTPATIENT
Start: 2018-12-21 | End: 2018-12-31

## 2018-12-21 NOTE — TELEPHONE ENCOUNTER
Patient was seen 12-14-18 for folliculitis, given RX for bacitracin.  Patient reports via mychart that the bacitracin is not working.  Recommend alternative med?  Pharm ready.    Routing to provider.  Felisha PABLO RN

## 2018-12-28 NOTE — PROGRESS NOTES
"  SUBJECTIVE:                                                    Carly Gregg is 68 year old female   Chief Complaint   Patient presents with     Derm Problem     Continued issue with sores/rash on right neck scapula area. Has tried Bacitracin and Azithromycin to little effect. States that she feels that rash is starting to radiate to left side of neck/upper back as well.      Below are notes from 12/08/18 evaluation with Dr. Robledo.    \"SORES        Duration: 3 weeks    Description (location/character/radiation): Right neck-shoulder blade area.  States she has sores and the area is painful.    Intensity:  moderate, severe at times.    Accompanying signs and symptoms: Feels like there is a lump in the area.  No fever chills.    History (similar episodes/previous evaluation): She does go regularly to a Chiropractor and that has not helped.  Massage is not helping.     Precipitating or alleviating factors: Certain movements with tiling her head back and to the side.  Being at work-tired by the end of the day due to symptoms.  Therapies tried and outcome: Tylenol-she can make it through work when taking this.(L73.9) Folliculitis  Comment:   Plan: use the hypoallergenic products, with no perfume or coloring. Do not use cortisone cream.   Consider the Bacitracin twice daily for the ointment, for 4-5 days. Rarely, an oral antibiotic would be needed.     (S41.547F) Strain of right trapezius muscle, initial encounter  Comment:   Plan: modify the use of the neck muscles and take 1-2 minute breaks for the neck every 30-60 minutes.   Ice first and then heat and massage. Tylenol is OK. Work with your chiropractor.      Liang Robledo\"    Problem list and histories reviewed & adjusted, as indicated.  Additional history: as documented    Patient Active Problem List   Diagnosis     Contact dermatitis     CARDIOVASCULAR SCREENING; LDL GOAL LESS THAN 160     S/P gastric bypass     Insomnia     Recurrent major " depression in complete remission (H)     B12 deficiency anemia     Psychophysiological insomnia     Acute upper gastrointestinal bleeding     Failing dental implant     Folliculitis     Strain of right trapezius muscle     Past Surgical History:   Procedure Laterality Date     BIOPSY       CHOLECYSTECTOMY       COLONOSCOPY  ?     COLONOSCOPY N/A 8/10/2018    Procedure: COLONOSCOPY;  colonoscopy;  Surgeon: Phillip Robbins MD;  Location: [facility]     ESOPHAGOSCOPY, GASTROSCOPY, DUODENOSCOPY (EGD), COMBINED N/A 2016    Procedure: COMBINED ESOPHAGOSCOPY, GASTROSCOPY, DUODENOSCOPY (EGD);  Surgeon: Rivas Jade MD;  Location: [facility]     GASTRIC BYPASS       HEAD & NECK SURGERY  5 in the past 12 mos    Dental Implant Surgery / Bone added     TONSILLECTOMY  age 29     TRANSPLANT      Dental Implants ongoing since 2015       Social History     Tobacco Use     Smoking status: Never Smoker     Smokeless tobacco: Never Used   Substance Use Topics     Alcohol use: Yes     Comment: Maybe a couple glasses of wine every couple of months-At the Holidays.     Family History   Problem Relation Age of Onset     Cancer Mother         stomach cancer     Cardiovascular Mother         MI     Diabetes Mother      Obesity Mother      C.A.D. Maternal Grandfather         bypass     Genitourinary Problems Brother         kidney transplant     Heart Disease Sister         heart murmur     Heart Disease Sister         heart murmur     Cancer Father         bone and blood cancer  6 months later     Other Cancer Father          14     Suicide Maternal Grandmother         Fransisco     Cerebrovascular Disease Maternal Grandmother      Coronary Artery Disease Paternal Grandfather      Suicide Brother         Christian     Hypertension Brother      Hyperlipidemia Brother      Obesity Brother      Depression Brother         Suicide     Depression Brother         Suicide     Obesity Sister      Obesity Sister   "    Obesity Sister      Obesity Brother          Current Outpatient Medications   Medication Sig Dispense Refill     Folic Acid-Vit B6-Vit B12 2.5-25-1 MG TABS Take 1 tablet by mouth daily 90 tablet 3     sertraline (ZOLOFT) 100 MG tablet Take 1 tablet (100 mg) by mouth daily 90 tablet 3     Allergies   Allergen Reactions     Morphine Other (See Comments)     Morphine injection given for Hemorid infection.  Patient says she had trouble breathing and her heart raced.     Nsaids      Fainting and hospitalization.     Penicillins Unknown     Novocain [Procaine Hcl] Rash     Can do lidocaine for iv start     Recent Labs   Lab Test 06/20/18  0822 06/23/16 2010 03/02/16  0749 08/31/14  1623  02/21/13  0845 02/20/12  1358   LDL  --   --  91  --   --  110 99   HDL  --   --  91  --   --  69 82   TRIG  --   --  60  --   --  58 103   ALT  --  27  --  40  --   --   --    CR 0.74 0.67 0.79 0.75   < > 0.75  --    GFRESTIMATED 78 88 73 78   < > 79  --    GFRESTBLACK >90 >90   GFR Calc   88 >90   GFR Calc     < > >90  --    POTASSIUM 3.8 4.3 4.5 3.9   < > 4.5  --    TSH 1.81  --   --   --   --  1.85  --     < > = values in this interval not displayed.      BP Readings from Last 3 Encounters:   12/31/18 104/62   12/14/18 114/68   08/10/18 (!) 123/93    Wt Readings from Last 3 Encounters:   12/31/18 61.1 kg (134 lb 12.8 oz)   12/14/18 59.9 kg (132 lb)   08/10/18 59 kg (130 lb)         ROS:  Constitutional, HEENT, cardiovascular, pulmonary, gi and gu systems are negative, except as otherwise noted.    OBJECTIVE:                                                    /62   Pulse 66   Temp 96.9  F (36.1  C) (Tympanic)   Resp 12   Ht 1.549 m (5' 1\")   Wt 61.1 kg (134 lb 12.8 oz)   SpO2 99%   BMI 25.47 kg/m    GENERAL APPEARANCE ADULT: Alert, no acute distress  MS: extremities normal, no peripheral edema  SKIN: rash present, type:macular, patch, appearance: variegated, location: right posterior " neck and right scapular skin  NEURO: Alert, oriented, speech and mentation normal  PSYCH: mentation appears normal., affect and mood normal  Diagnostic Test Results:  none      ASSESSMENT/PLAN:                                                    1. Herpes zoster without complication  Mild appearance as had vaccine,  Getting some protection.  - acyclovir (ZOVIRAX) 800 MG tablet; Take 1 tablet (800 mg) by mouth 5 times daily  Dispense: 30 tablet; Refill: 1    Ayde Tatum MD  [facility]

## 2018-12-31 ENCOUNTER — OFFICE VISIT (OUTPATIENT)
Dept: FAMILY MEDICINE | Facility: CLINIC | Age: 68
End: 2018-12-31
Payer: COMMERCIAL

## 2018-12-31 VITALS
WEIGHT: 134.8 LBS | HEART RATE: 66 BPM | HEIGHT: 61 IN | DIASTOLIC BLOOD PRESSURE: 62 MMHG | SYSTOLIC BLOOD PRESSURE: 104 MMHG | RESPIRATION RATE: 12 BRPM | OXYGEN SATURATION: 99 % | TEMPERATURE: 96.9 F | BODY MASS INDEX: 25.45 KG/M2

## 2018-12-31 DIAGNOSIS — B02.9 HERPES ZOSTER WITHOUT COMPLICATION: Primary | ICD-10-CM

## 2018-12-31 PROCEDURE — 99213 OFFICE O/P EST LOW 20 MIN: CPT | Performed by: FAMILY MEDICINE

## 2018-12-31 RX ORDER — ACYCLOVIR 800 MG/1
800 TABLET ORAL
Qty: 30 TABLET | Refills: 1 | Status: SHIPPED | OUTPATIENT
Start: 2018-12-31 | End: 2019-01-14

## 2018-12-31 RX ORDER — ACYCLOVIR 800 MG/1
800 TABLET ORAL
Qty: 450 TABLET | Refills: 3 | Status: SHIPPED | OUTPATIENT
Start: 2018-12-31 | End: 2018-12-31

## 2018-12-31 ASSESSMENT — MIFFLIN-ST. JEOR: SCORE: 1083.83

## 2018-12-31 NOTE — NURSING NOTE
"Initial /62   Pulse 66   Temp 96.9  F (36.1  C) (Tympanic)   Resp 12   Ht 1.549 m (5' 1\")   Wt 61.1 kg (134 lb 12.8 oz)   SpO2 99%   BMI 25.47 kg/m   Estimated body mass index is 25.47 kg/m  as calculated from the following:    Height as of this encounter: 1.549 m (5' 1\").    Weight as of this encounter: 61.1 kg (134 lb 12.8 oz). .      "

## 2019-01-05 ENCOUNTER — MYC MEDICAL ADVICE (OUTPATIENT)
Dept: FAMILY MEDICINE | Facility: CLINIC | Age: 69
End: 2019-01-05

## 2019-01-07 NOTE — TELEPHONE ENCOUNTER
Dr. Tatum,    Patient sends a my chart message about his shingles.  Asking if she should refill the medication acyclovir as she had limited relief from it.  Please advise. Ruth SINCLAIR RN

## 2019-01-14 ENCOUNTER — MYC MEDICAL ADVICE (OUTPATIENT)
Dept: FAMILY MEDICINE | Facility: CLINIC | Age: 69
End: 2019-01-14

## 2019-01-14 DIAGNOSIS — B02.9 HERPES ZOSTER WITHOUT COMPLICATION: ICD-10-CM

## 2019-01-14 RX ORDER — ACYCLOVIR 800 MG/1
800 TABLET ORAL
Qty: 30 TABLET | Refills: 1 | Status: SHIPPED | OUTPATIENT
Start: 2019-01-14 | End: 2019-07-29

## 2019-07-26 NOTE — PATIENT INSTRUCTIONS
Patient Education   Personalized Prevention Plan  You are due for the preventive services outlined below.  Your care team is available to assist you in scheduling these services.  If you have already completed any of these items, please share that information with your care team to update in your medical record.  Health Maintenance Due   Topic Date Due     Depression Action Plan  1950     Depression Assessment  06/14/2019     Annual Wellness Visit  06/20/2019     FALL RISK ASSESSMENT  06/20/2019     FIT Test  06/29/2019

## 2019-07-26 NOTE — PROGRESS NOTES
"  SUBJECTIVE:   Carly Gregg is a 68 year old female who presents for Preventive Visit.    Are you in the first 12 months of your Medicare Part B coverage?  No    Physical Health:    In general, how would you rate your overall physical health? { :862792}    Outside of work, how many days during the week do you exercise? { :990612}    Outside of work, approximately how many minutes a day do you exercise?{ :175930}    If you drink alcohol do you typically have >3 drinks per day or >7 drinks per week? { :819681}    Do you usually eat at least 4 servings of fruit and vegetables a day, include whole grains & fiber and avoid regularly eating high fat or \"junk\" foods? { :656581::\"Yes\"}    Do you have any problems taking medications regularly?  { :832344::\"No\"}    Do you have any side effects from medications? { :636451}    Needs assistance for the following daily activities: { :855786}    Which of the following safety concerns are present in your home?  { :435638::\"none identified\"}     Hearing impairment: { :419169}    In the past 6 months, have you been bothered by leaking of urine? { :822091}    Mental Health:    In general, how would you rate your overall mental or emotional health? { :673593}  PHQ-2 Score:      Do you feel safe in your environment? { :560410}    Do you have a Health Care Directive? { :113507}    Additional concerns to address?  {If YES, notify patient they may be responsible for a copay, coinsurance or deductible if the visit today includes services such as checking on a sore throat, having an x-ray or lab test, or treating and evaluating a new or existing condition :793461::\"No\"}    Fall risk:  { :656288}  {If any of the above assessments are answered yes, consider ordering appropriate referrals (Optional):988207::\"click delete button to remove this line now\"}  Cognitive Screenin) Repeat 3 items (Leader, Season, Table)  {Get patient's attention, then say, \"I am going to say three words " "that I want you to remember now and later.  The words are Leader, Season, and Table.  Please say them for me now.\"  If pt. unable after 3 tries, go to next item}  2) Clock draw: {Say the following phrases in order: \"Please draw a clock.  Start by drawing a large [place].  Put all the numbers in the [place] and set the hands to show 11:10 (10 past 11).\"  If pt cannot complete in 3 minutes, stop and ask for recall items.  \"NORMAL\" test = all twelve numbers in correct location, and clock hands correctly designating 11:10}{ :88427::\"NORMAL\"}  3) 3 item recall: {Say: \"What were the three words I asked you to remember?\"}{ :846283}  Results: {MINICOG RESULTS:016048}    Mini-CogTM Copyright S Ton. Licensed by the author for use in [facility]; reprinted with permission (soob@.[email or web address]). All rights reserved.      {Do you have sleep apnea, excessive snoring or daytime drowsiness? (Optional):141030}        Reviewed and updated as needed this visit by clinical staff         Reviewed and updated as needed this visit by Provider        Social History     Tobacco Use     Smoking status: Never Smoker     Smokeless tobacco: Never Used   Substance Use Topics     Alcohol use: Yes     Comment: Maybe a couple glasses of wine every couple of months-At the Holidays.                           Current providers sharing in care for this patient include:   Patient Care Team:  Ayde Tatum MD as PCP - General  Ayde Tatum MD as Assigned PCP    The following health maintenance items are reviewed in Epic and correct as of today:  Health Maintenance   Topic Date Due     DEPRESSION ACTION PLAN  1950     PHQ-9  06/14/2019     MEDICARE ANNUAL WELLNESS VISIT  06/20/2019     FALL RISK ASSESSMENT  06/20/2019     FIT  06/29/2019     INFLUENZA VACCINE (1) 09/01/2019     MAMMO SCREENING  07/05/2020     LIPID  03/02/2021     DTAP/TDAP/TD IMMUNIZATION (2 - Td) 10/18/2021     ADVANCE CARE PLANNING  08/01/2023     DEXA  " "Completed     HEPATITIS C SCREENING  Completed     PNEUMOCOCCAL IMMUNIZATION 65+ LOW/MEDIUM RISK  Completed     ZOSTER IMMUNIZATION  Completed     IPV IMMUNIZATION  Aged Out     MENINGITIS IMMUNIZATION  Aged Out     {Chronicprobdata (Optional):499767}  {Decision Support (Optional):312116}    ROS:  {ROS COMP:657920}    OBJECTIVE:   There were no vitals taken for this visit. Estimated body mass index is 25.47 kg/m  as calculated from the following:    Height as of 18: 1.549 m (5' 1\").    Weight as of 18: 61.1 kg (134 lb 12.8 oz).  EXAM:   {Exam :655776}    {Diagnostic Test Results (Optional):143679::\"Diagnostic Test Results:\",\"Labs reviewed in Epic\"}    ASSESSMENT / PLAN:   {Diag Picklist:699411}    End of Life Planning:  Patient currently has an advanced directive: { :833105}    COUNSELING:  {Medicare Counselin}    Estimated body mass index is 25.47 kg/m  as calculated from the following:    Height as of 18: 1.549 m (5' 1\").    Weight as of 18: 61.1 kg (134 lb 12.8 oz).    {Weight Management Plan (ACO) Complete if BMI is abnormal-  Ages 18-64  BMI >24.9.  Age 65+ with BMI <23 or >30 (Optional):502135}     reports that she has never smoked. She has never used smokeless tobacco.  {Tobacco Cessation -- Complete if patient is a smoker (Optional):179048}    Appropriate preventive services were discussed with this patient, including applicable screening as appropriate for cardiovascular disease, diabetes, osteopenia/osteoporosis, and glaucoma.  As appropriate for age/gender, discussed screening for colorectal cancer, prostate cancer, breast cancer, and cervical cancer. Checklist reviewing preventive services available has been given to the patient.    Reviewed patients plan of care and provided an AVS. The {CarePlan:815612} for Carly meets the Care Plan requirement. This Care Plan has been established and reviewed with the {PATIENT, FAMILY MEMBER, CAREGIVER:993305}.    Counseling " Resources:  ATP IV Guidelines  Pooled Cohorts Equation Calculator  Breast Cancer Risk Calculator  FRAX Risk Assessment  ICSI Preventive Guidelines  Dietary Guidelines for Americans, 2010  USDA's MyPlate  ASA Prophylaxis  Lung CA Screening    Ayde Tatum MD  [facility]

## 2019-07-28 ASSESSMENT — ENCOUNTER SYMPTOMS
WEAKNESS: 1
HEARTBURN: 0
JOINT SWELLING: 0
NAUSEA: 0
HEMATURIA: 0
CHILLS: 0
DIARRHEA: 0
ARTHRALGIAS: 0
MYALGIAS: 0
SHORTNESS OF BREATH: 0
SORE THROAT: 0
ABDOMINAL PAIN: 0
PARESTHESIAS: 1
COUGH: 0
HEMATOCHEZIA: 0
CONSTIPATION: 0
PALPITATIONS: 0
BREAST MASS: 0
DYSURIA: 0
DIZZINESS: 0

## 2019-07-28 ASSESSMENT — ACTIVITIES OF DAILY LIVING (ADL): CURRENT_FUNCTION: NO ASSISTANCE NEEDED

## 2019-07-29 ENCOUNTER — OFFICE VISIT (OUTPATIENT)
Dept: FAMILY MEDICINE | Facility: CLINIC | Age: 69
End: 2019-07-29
Payer: COMMERCIAL

## 2019-07-29 VITALS
DIASTOLIC BLOOD PRESSURE: 62 MMHG | HEART RATE: 70 BPM | BODY MASS INDEX: 26 KG/M2 | HEIGHT: 60 IN | WEIGHT: 132.4 LBS | TEMPERATURE: 97.3 F | SYSTOLIC BLOOD PRESSURE: 98 MMHG | OXYGEN SATURATION: 99 % | RESPIRATION RATE: 12 BRPM

## 2019-07-29 DIAGNOSIS — Z00.00 ENCOUNTER FOR MEDICARE ANNUAL WELLNESS EXAM: Primary | ICD-10-CM

## 2019-07-29 DIAGNOSIS — F33.42 RECURRENT MAJOR DEPRESSION IN COMPLETE REMISSION (H): ICD-10-CM

## 2019-07-29 DIAGNOSIS — Z98.84 S/P GASTRIC BYPASS: ICD-10-CM

## 2019-07-29 DIAGNOSIS — H91.8X1 OTHER SPECIFIED HEARING LOSS OF RIGHT EAR, UNSPECIFIED HEARING STATUS ON CONTRALATERAL SIDE: ICD-10-CM

## 2019-07-29 LAB
ANION GAP SERPL CALCULATED.3IONS-SCNC: 5 MMOL/L (ref 3–14)
BASOPHILS # BLD AUTO: 0 10E9/L (ref 0–0.2)
BASOPHILS NFR BLD AUTO: 0.7 %
BUN SERPL-MCNC: 8 MG/DL (ref 7–30)
CALCIUM SERPL-MCNC: 9.2 MG/DL (ref 8.5–10.1)
CHLORIDE SERPL-SCNC: 108 MMOL/L (ref 94–109)
CO2 SERPL-SCNC: 26 MMOL/L (ref 20–32)
CREAT SERPL-MCNC: 0.74 MG/DL (ref 0.52–1.04)
DIFFERENTIAL METHOD BLD: ABNORMAL
EOSINOPHIL # BLD AUTO: 0.2 10E9/L (ref 0–0.7)
EOSINOPHIL NFR BLD AUTO: 4.9 %
ERYTHROCYTE [DISTWIDTH] IN BLOOD BY AUTOMATED COUNT: 18.7 % (ref 10–15)
GFR SERPL CREATININE-BSD FRML MDRD: 83 ML/MIN/{1.73_M2}
GLUCOSE SERPL-MCNC: 88 MG/DL (ref 70–99)
HCT VFR BLD AUTO: 27.9 % (ref 35–47)
HGB BLD-MCNC: 8.7 G/DL (ref 11.7–15.7)
LYMPHOCYTES # BLD AUTO: 1.5 10E9/L (ref 0.8–5.3)
LYMPHOCYTES NFR BLD AUTO: 33.4 %
MCH RBC QN AUTO: 25 PG (ref 26.5–33)
MCHC RBC AUTO-ENTMCNC: 31.2 G/DL (ref 31.5–36.5)
MCV RBC AUTO: 80 FL (ref 78–100)
MONOCYTES # BLD AUTO: 0.5 10E9/L (ref 0–1.3)
MONOCYTES NFR BLD AUTO: 11.9 %
NEUTROPHILS # BLD AUTO: 2.2 10E9/L (ref 1.6–8.3)
NEUTROPHILS NFR BLD AUTO: 49.1 %
PLATELET # BLD AUTO: 428 10E9/L (ref 150–450)
POTASSIUM SERPL-SCNC: 3.6 MMOL/L (ref 3.4–5.3)
RBC # BLD AUTO: 3.48 10E12/L (ref 3.8–5.2)
SODIUM SERPL-SCNC: 139 MMOL/L (ref 133–144)
WBC # BLD AUTO: 4.5 10E9/L (ref 4–11)

## 2019-07-29 PROCEDURE — 80048 BASIC METABOLIC PNL TOTAL CA: CPT | Performed by: FAMILY MEDICINE

## 2019-07-29 PROCEDURE — 85025 COMPLETE CBC W/AUTO DIFF WBC: CPT | Performed by: FAMILY MEDICINE

## 2019-07-29 PROCEDURE — 99212 OFFICE O/P EST SF 10 MIN: CPT | Mod: 25 | Performed by: FAMILY MEDICINE

## 2019-07-29 PROCEDURE — 36415 COLL VENOUS BLD VENIPUNCTURE: CPT | Performed by: FAMILY MEDICINE

## 2019-07-29 PROCEDURE — 99397 PER PM REEVAL EST PAT 65+ YR: CPT | Performed by: FAMILY MEDICINE

## 2019-07-29 RX ORDER — SERTRALINE HYDROCHLORIDE 100 MG/1
100 TABLET, FILM COATED ORAL DAILY
Qty: 90 TABLET | Refills: 3 | Status: SHIPPED | OUTPATIENT
Start: 2019-07-29 | End: 2020-06-24

## 2019-07-29 ASSESSMENT — ANXIETY QUESTIONNAIRES
GAD7 TOTAL SCORE: 1
1. FEELING NERVOUS, ANXIOUS, OR ON EDGE: NOT AT ALL
IF YOU CHECKED OFF ANY PROBLEMS ON THIS QUESTIONNAIRE, HOW DIFFICULT HAVE THESE PROBLEMS MADE IT FOR YOU TO DO YOUR WORK, TAKE CARE OF THINGS AT HOME, OR GET ALONG WITH OTHER PEOPLE: SOMEWHAT DIFFICULT
6. BECOMING EASILY ANNOYED OR IRRITABLE: SEVERAL DAYS
3. WORRYING TOO MUCH ABOUT DIFFERENT THINGS: NOT AT ALL
7. FEELING AFRAID AS IF SOMETHING AWFUL MIGHT HAPPEN: NOT AT ALL
2. NOT BEING ABLE TO STOP OR CONTROL WORRYING: NOT AT ALL
5. BEING SO RESTLESS THAT IT IS HARD TO SIT STILL: NOT AT ALL

## 2019-07-29 ASSESSMENT — ENCOUNTER SYMPTOMS
DYSURIA: 0
HEARTBURN: 0
HEMATOCHEZIA: 0
JOINT SWELLING: 0
DIZZINESS: 0
CHILLS: 0
DIARRHEA: 0
NAUSEA: 0
PARESTHESIAS: 1
HEMATURIA: 0
ABDOMINAL PAIN: 0
CONSTIPATION: 0
PALPITATIONS: 0
WEAKNESS: 1
BREAST MASS: 0
ARTHRALGIAS: 0
COUGH: 0
SHORTNESS OF BREATH: 0
SORE THROAT: 0
MYALGIAS: 0

## 2019-07-29 ASSESSMENT — ACTIVITIES OF DAILY LIVING (ADL): CURRENT_FUNCTION: NO ASSISTANCE NEEDED

## 2019-07-29 ASSESSMENT — PATIENT HEALTH QUESTIONNAIRE - PHQ9
SUM OF ALL RESPONSES TO PHQ QUESTIONS 1-9: 1
5. POOR APPETITE OR OVEREATING: NOT AT ALL

## 2019-07-29 ASSESSMENT — MIFFLIN-ST. JEOR: SCORE: 1057.06

## 2019-07-29 NOTE — NURSING NOTE
Initial BP 98/62   Pulse 70   Temp 97.3  F (36.3  C) (Tympanic)   Resp 12   Ht 1.524 m (5')   Wt 60.1 kg (132 lb 6.4 oz)   SpO2 99%   BMI 25.86 kg/m   Estimated body mass index is 25.86 kg/m  as calculated from the following:    Height as of this encounter: 1.524 m (5').    Weight as of this encounter: 60.1 kg (132 lb 6.4 oz). .

## 2019-07-29 NOTE — PROGRESS NOTES
"SUBJECTIVE:   Carly Gregg is a 68 year old female who presents for Preventive Visit.    Are you in the first 12 months of your Medicare coverage?  No    Healthy Habits:     In general, how would you rate your overall health?  Good    Frequency of exercise:  1 day/week    Duration of exercise:  15-30 minutes    Do you usually eat at least 4 servings of fruit and vegetables a day, include whole grains    & fiber and avoid regularly eating high fat or \"junk\" foods?  No    Taking medications regularly:  Yes    Medication side effects:  None    Ability to successfully perform activities of daily living:  No assistance needed    Home Safety:  No safety concerns identified    Hearing Impairment:  No hearing concerns    In the past 6 months, have you been bothered by leaking of urine?  No    In general, how would you rate your overall mental or emotional health?  Good      PHQ-2 Total Score: 0    Additional concerns today:  No    Do you feel safe in your environment? Yes    Do you have a Health Care Directive? Yes: Advance Directive has been received and scanned.      Fall risk  Fallen 2 or more times in the past year?: No  Any fall with injury in the past year?: No    Cognitive Screening   1) Repeat 3 items (Leader, Season, Table)    2) Clock draw: NORMAL  3) 3 item recall: Recalls 3 objects  Results: 3 items recalled: COGNITIVE IMPAIRMENT LESS LIKELY    Mini-CogTM Copyright DARIN Camilo. Licensed by the author for use in [facility]; reprinted with permission (aide@.[facility]). All rights reserved.      Do you have sleep apnea, excessive snoring or daytime drowsiness?: yes    Reviewed and updated as needed this visit by clinical staff  Tobacco  Allergies  Meds  Med Hx  Surg Hx  Fam Hx  Soc Hx        Reviewed and updated as needed this visit by Provider        Social History     Tobacco Use     Smoking status: Never Smoker     Smokeless tobacco: Never Used   Substance Use Topics     Alcohol use: Yes     " Comment: Maybe a couple glasses of wine every couple of months         Alcohol Use 7/28/2019   Prescreen: >3 drinks/day or >7 drinks/week? No   Prescreen: >3 drinks/day or >7 drinks/week? -       Current providers sharing in care for this patient include:   Patient Care Team:  Ayde Tatum MD as PCP - General  Ayde Tatum MD as Assigned PCP    The following health maintenance items are reviewed in Epic and correct as of today:  Health Maintenance   Topic Date Due     DEPRESSION ACTION PLAN  1950     PHQ-9  06/14/2019     MEDICARE ANNUAL WELLNESS VISIT  06/20/2019     FALL RISK ASSESSMENT  06/20/2019     INFLUENZA VACCINE (1) 09/01/2019     MAMMO SCREENING  07/05/2020     LIPID  03/02/2021     DTAP/TDAP/TD IMMUNIZATION (2 - Td) 10/18/2021     ADVANCE CARE PLANNING  08/01/2023     COLONOSCOPY  08/10/2028     DEXA  Completed     HEPATITIS C SCREENING  Completed     PNEUMOCOCCAL IMMUNIZATION 65+ LOW/MEDIUM RISK  Completed     ZOSTER IMMUNIZATION  Completed     IPV IMMUNIZATION  Aged Out     MENINGITIS IMMUNIZATION  Aged Out     BP Readings from Last 3 Encounters:   07/29/19 98/62   12/31/18 104/62   12/14/18 114/68    Wt Readings from Last 3 Encounters:   07/29/19 60.1 kg (132 lb 6.4 oz)   12/31/18 61.1 kg (134 lb 12.8 oz)   12/14/18 59.9 kg (132 lb)                  Patient Active Problem List   Diagnosis     Contact dermatitis     CARDIOVASCULAR SCREENING; LDL GOAL LESS THAN 160     S/P gastric bypass     Insomnia     Recurrent major depression in complete remission (H)     B12 deficiency anemia     Psychophysiological insomnia     Acute upper gastrointestinal bleeding     Failing dental implant     Folliculitis     Strain of right trapezius muscle     Past Surgical History:   Procedure Laterality Date     BIOPSY  2008     CHOLECYSTECTOMY  1980     COLONOSCOPY  2003?     COLONOSCOPY N/A 8/10/2018    Procedure: COLONOSCOPY;  colonoscopy;  Surgeon: Phillip Robbins MD;  Location: [facility]      ESOPHAGOSCOPY, GASTROSCOPY, DUODENOSCOPY (EGD), COMBINED N/A 2016    Procedure: COMBINED ESOPHAGOSCOPY, GASTROSCOPY, DUODENOSCOPY (EGD);  Surgeon: Rivas Jade MD;  Location: [facility]     GASTRIC BYPASS       HEAD & NECK SURGERY  5 in the past 12 mos    Dental Implant Surgery / Bone added     TONSILLECTOMY  age 29     TRANSPLANT      Dental Implants ongoing since 2015       Social History     Tobacco Use     Smoking status: Never Smoker     Smokeless tobacco: Never Used   Substance Use Topics     Alcohol use: Yes     Comment: Maybe a couple glasses of wine every couple of months     Family History   Problem Relation Age of Onset     Cancer Mother         stomach cancer     Cardiovascular Mother         MI     Diabetes Mother      Obesity Mother      C.A.D. Maternal Grandfather         bypass     Genitourinary Problems Brother         kidney transplant     Heart Disease Sister         heart murmur     Heart Disease Sister         heart murmur     Cancer Father         bone and blood cancer  6 months later     Other Cancer Father          14     Suicide Maternal Grandmother         Fransisco     Cerebrovascular Disease Maternal Grandmother      Coronary Artery Disease Paternal Grandfather      Suicide Brother         Christian     Hypertension Brother      Hyperlipidemia Brother      Obesity Brother      Depression Brother         Suicide     Depression Brother         Suicide     Obesity Sister      Obesity Sister      Obesity Sister      Obesity Brother          Current Outpatient Medications   Medication Sig Dispense Refill     Folic Acid-Vit B6-Vit B12 2.5-25-1 MG TABS Take 1 tablet by mouth daily 90 tablet 3     sertraline (ZOLOFT) 100 MG tablet Take 1 tablet (100 mg) by mouth daily 90 tablet 3     Allergies   Allergen Reactions     Morphine Other (See Comments)     Morphine injection given for Hemorid infection.  Patient says she had trouble breathing and her heart raced.     Nsaids       Fainting and hospitalization.     Penicillins Unknown     Novocain [Procaine Hcl] Rash     Can do lidocaine for iv start     Pneumonia Vaccine:complete  Mammogram Screening: Mammogram Screening: Patient over age 50, mutual decision to screen reflected in health maintenance.  Last 3 Pap and HPV Results:   PAP / HPV 2/25/2014 2/20/2012   PAP NIL NIL       Review of Systems   Constitutional: Negative for chills.   HENT: Negative for congestion, hearing loss and sore throat.    Eyes: Positive for visual disturbance.   Respiratory: Negative for cough and shortness of breath.    Cardiovascular: Negative for chest pain, palpitations and peripheral edema.   Gastrointestinal: Negative for abdominal pain, constipation, diarrhea, heartburn, hematochezia and nausea.   Breasts:  Negative for tenderness, breast mass and discharge.   Genitourinary: Negative for dysuria, hematuria, pelvic pain, urgency, vaginal bleeding and vaginal discharge.   Musculoskeletal: Negative for arthralgias, joint swelling and myalgias.   Skin: Negative for rash.   Neurological: Positive for weakness and paresthesias. Negative for dizziness.   Psychiatric/Behavioral: Negative for mood changes.         OBJECTIVE:   BP 98/62   Pulse 70   Temp 97.3  F (36.3  C) (Tympanic)   Resp 12   Ht 1.524 m (5')   Wt 60.1 kg (132 lb 6.4 oz)   SpO2 99%   BMI 25.86 kg/m   Estimated body mass index is 25.86 kg/m  as calculated from the following:    Height as of this encounter: 1.524 m (5').    Weight as of this encounter: 60.1 kg (132 lb 6.4 oz).  Physical Exam  GENERAL APPEARANCE: healthy, alert and no distress  EYES: Eyes grossly normal to inspection, PERRL and conjunctivae and sclerae normal  HENT: ear canals and TM's normal, nose and mouth without ulcers or lesions, oropharynx clear and oral mucous membranes moist  NECK: no adenopathy, no asymmetry, masses, or scars and thyroid normal to palpation  RESP: lungs clear to auscultation - no rales, rhonchi or  wheezes  BREAST: normal without masses, tenderness or nipple discharge and no palpable axillary masses or adenopathy  CV: regular rate and rhythm, normal S1 S2, no S3 or S4, no murmur, click or rub, no peripheral edema and peripheral pulses strong  ABDOMEN: soft, nontender, no hepatosplenomegaly, no masses and bowel sounds normal  MS: no musculoskeletal defects are noted and gait is age appropriate without ataxia  SKIN: no suspicious lesions or rashes  NEURO: Normal strength and tone, sensory exam grossly normal, mentation intact and speech normal  PSYCH: mentation appears normal and affect normal/bright    Diagnostic Test Results:  Labs reviewed in Epic    ASSESSMENT / PLAN:   1. Encounter for Medicare annual wellness exam  Low risk cervical cancer, low risk breast cancer.    2. S/P gastric bypass  due for review and refill, taking medication without difficulty  - Folic Acid-Vit B6-Vit B12 2.5-25-1 MG TABS; Take 1 tablet by mouth daily  Dispense: 90 tablet; Refill: 3  - CBC with platelets differential  - Basic metabolic panel    3. Recurrent major depression in complete remission (H)  due for review and refill, taking medication without difficulty  - sertraline (ZOLOFT) 100 MG tablet; Take 1 tablet (100 mg) by mouth daily  Dispense: 90 tablet; Refill: 3    4. Other specified hearing loss of right ear, unspecified hearing status on contralateral side  - AUDIOLOGY ADULT REFERRAL    End of Life Planning:  Patient currently has an advanced directive: Yes.  Practitioner is supportive of decision.    COUNSELING:  Reviewed preventive health counseling, as reflected in patient instructions       Regular exercise       Healthy diet/nutrition       Vision screening       Hearing screening       Dental care       Bladder control    Estimated body mass index is 25.86 kg/m  as calculated from the following:    Height as of this encounter: 1.524 m (5').    Weight as of this encounter: 60.1 kg (132 lb 6.4 oz).         reports  that she has never smoked. She has never used smokeless tobacco.      Appropriate preventive services were discussed with this patient, including applicable screening as appropriate for cardiovascular disease, diabetes, osteopenia/osteoporosis, and glaucoma.  As appropriate for age/gender, discussed screening for colorectal cancer, prostate cancer, breast cancer, and cervical cancer. Checklist reviewing preventive services available has been given to the patient.    Reviewed patients plan of care and provided an AVS. The Basic Care Plan (routine screening as documented in Health Maintenance) for Carly meets the Care Plan requirement. This Care Plan has been established and reviewed with the Patient.    Counseling Resources:  ATP IV Guidelines  Pooled Cohorts Equation Calculator  Breast Cancer Risk Calculator  FRAX Risk Assessment  ICSI Preventive Guidelines  Dietary Guidelines for Americans, 2010  USDA's MyPlate  ASA Prophylaxis  Lung CA Screening    Ayde Tatum MD  [facility]    Identified Health Risks:

## 2019-07-30 ASSESSMENT — ANXIETY QUESTIONNAIRES: GAD7 TOTAL SCORE: 1

## 2019-08-13 ENCOUNTER — OFFICE VISIT (OUTPATIENT)
Dept: AUDIOLOGY | Facility: CLINIC | Age: 69
End: 2019-08-13
Attending: FAMILY MEDICINE
Payer: COMMERCIAL

## 2019-08-13 DIAGNOSIS — H91.93 BILATERAL HIGH FREQUENCY HEARING LOSS: Primary | ICD-10-CM

## 2019-08-13 PROCEDURE — 92557 COMPREHENSIVE HEARING TEST: CPT | Performed by: AUDIOLOGIST

## 2019-08-13 PROCEDURE — 99207 ZZC NO CHARGE LOS: CPT | Performed by: AUDIOLOGIST

## 2019-08-13 PROCEDURE — 92550 TYMPANOMETRY & REFLEX THRESH: CPT | Performed by: AUDIOLOGIST

## 2019-08-13 NOTE — PROGRESS NOTES
AUDIOLOGY REPORT    SUBJECTIVE:  aCrly Gregg is a 68 year old female who was seen in the Audiology Clinic at [facility] for an audiologic evaluation, referred by Dr. Tatum.  No previous audiograms are available at today's appointment.  The patient reports she feels she hears well. She reports her hearing may be a little better in her left ear. The patient denies bilateral tinnitus, bilateral otalgia, family history of hearing loss and history of noise exposure.     OBJECTIVE:  Abuse Screening:  Do you feel unsafe at home or work/school? No   Do you feel threatened by someone? No   Does anyone try to keep you from having contact with others, or doing things outside of your home? No   Physical signs of abuse present? No      Otoscopic exam indicates ears are clear of cerumen bilaterally       Pure Tone Thresholds assessed using conventional audiometry with good  reliability from 250-8000 Hz bilaterally using circumaural headphones     RIGHT:  normal and moderate high tone hearing loss    LEFT:    normal and moderate high tone hearing loss    Tympanogram:    RIGHT: normal eardrum mobility ,1000 Hz ipsi/contra reflexes present     LEFT:   normal eardrum mobility ,1000 Hz ipsi/contra reflexes present     Speech Reception Threshold:    RIGHT: 15 dB HL    LEFT:   10 dB HL  Word Recognition Score:     RIGHT: 96% at 55 dB HL using NU-6 recorded word list.    LEFT:   100% at 50 dB HL using NU-6 recorded word list.      ASSESSMENT:   Normal hearing through 6000 Hz sloping to a moderate hearing loss at 8000 Hz bilaterally.     Today s results were discussed with the patient in detail.     PLAN:  Patient was counseled regarding hearing loss and impact on communication. Return to clinic if notes changes in her ears or hearing.  Please call this clinic with questions regarding these results or recommendations.        Shara Hart M.A. IAN-AAA  Clinical audiologist [place] # [id number]  8/13/2019

## 2019-09-16 ENCOUNTER — MYC MEDICAL ADVICE (OUTPATIENT)
Dept: FAMILY MEDICINE | Facility: CLINIC | Age: 69
End: 2019-09-16

## 2019-11-03 ENCOUNTER — HEALTH MAINTENANCE LETTER (OUTPATIENT)
Age: 69
End: 2019-11-03

## 2020-05-25 ENCOUNTER — E-VISIT (OUTPATIENT)
Dept: FAMILY MEDICINE | Facility: CLINIC | Age: 70
End: 2020-05-25
Payer: COMMERCIAL

## 2020-05-25 DIAGNOSIS — S46.811A STRAIN OF RIGHT TRAPEZIUS MUSCLE, INITIAL ENCOUNTER: Primary | ICD-10-CM

## 2020-05-25 PROCEDURE — 99422 OL DIG E/M SVC 11-20 MIN: CPT | Performed by: FAMILY MEDICINE

## 2020-05-27 ENCOUNTER — TELEPHONE (OUTPATIENT)
Dept: FAMILY MEDICINE | Facility: CLINIC | Age: 70
End: 2020-05-27

## 2020-05-27 DIAGNOSIS — S46.811A STRAIN OF RIGHT TRAPEZIUS MUSCLE, INITIAL ENCOUNTER: ICD-10-CM

## 2020-05-27 DIAGNOSIS — M54.9 BACK PAIN: Primary | ICD-10-CM

## 2020-05-27 NOTE — TELEPHONE ENCOUNTER
Reason for Call: Request for an order:    Order being requested: PT for neck and lower back, patient has appointment today at [facility] needed: as soon as possible    Has the patient been seen by the PCP for this problem? YES    Additional comments: Fax # is [phone]      Phone number Patient can be reached at:  Other phone number:  KeyonnaSylvia, [phone]    Best Time:  Any    Can we leave a detailed message on this number?  YES    Call taken on 5/27/2020 at 8:47 AM by Sunita Reddy

## 2020-05-27 NOTE — TELEPHONE ENCOUNTER
PT referral ready.  She had E-visit for this back pain 5-25-20.    CSS - please fax to # below.    Routing to provider.  Felisha PABLO RN

## 2020-05-28 ENCOUNTER — TRANSFERRED RECORDS (OUTPATIENT)
Dept: HEALTH INFORMATION MANAGEMENT | Facility: CLINIC | Age: 70
End: 2020-05-28

## 2020-06-08 ENCOUNTER — TELEPHONE (OUTPATIENT)
Dept: FAMILY MEDICINE | Facility: CLINIC | Age: 70
End: 2020-06-08

## 2020-06-23 ENCOUNTER — TRANSFERRED RECORDS (OUTPATIENT)
Dept: HEALTH INFORMATION MANAGEMENT | Facility: CLINIC | Age: 70
End: 2020-06-23

## 2020-06-24 ENCOUNTER — OFFICE VISIT (OUTPATIENT)
Dept: FAMILY MEDICINE | Facility: CLINIC | Age: 70
End: 2020-06-24
Payer: COMMERCIAL

## 2020-06-24 VITALS
BODY MASS INDEX: 26.11 KG/M2 | SYSTOLIC BLOOD PRESSURE: 122 MMHG | TEMPERATURE: 97.4 F | HEIGHT: 60 IN | WEIGHT: 133 LBS | HEART RATE: 72 BPM | DIASTOLIC BLOOD PRESSURE: 70 MMHG | OXYGEN SATURATION: 99 %

## 2020-06-24 DIAGNOSIS — Z12.31 ENCOUNTER FOR SCREENING MAMMOGRAM FOR BREAST CANCER: ICD-10-CM

## 2020-06-24 DIAGNOSIS — F51.04 PSYCHOPHYSIOLOGICAL INSOMNIA: ICD-10-CM

## 2020-06-24 DIAGNOSIS — Z98.84 S/P GASTRIC BYPASS: ICD-10-CM

## 2020-06-24 DIAGNOSIS — F33.42 RECURRENT MAJOR DEPRESSION IN COMPLETE REMISSION (H): Primary | ICD-10-CM

## 2020-06-24 PROCEDURE — 99214 OFFICE O/P EST MOD 30 MIN: CPT | Performed by: NURSE PRACTITIONER

## 2020-06-24 RX ORDER — TRAZODONE HYDROCHLORIDE 50 MG/1
50 TABLET, FILM COATED ORAL AT BEDTIME
Qty: 90 TABLET | Refills: 3 | Status: SHIPPED | OUTPATIENT
Start: 2020-06-24 | End: 2020-08-27

## 2020-06-24 RX ORDER — SERTRALINE HYDROCHLORIDE 100 MG/1
100 TABLET, FILM COATED ORAL DAILY
Qty: 90 TABLET | Refills: 3 | Status: SHIPPED | OUTPATIENT
Start: 2020-06-24 | End: 2021-05-28

## 2020-06-24 ASSESSMENT — MIFFLIN-ST. JEOR: SCORE: 1054.78

## 2020-06-24 ASSESSMENT — PATIENT HEALTH QUESTIONNAIRE - PHQ9: SUM OF ALL RESPONSES TO PHQ QUESTIONS 1-9: 0

## 2020-06-24 NOTE — PROGRESS NOTES
Subjective     Carly Gregg is a 69 year old female who presents to clinic today for the following health issues:    HPI   Depression Followup    How are you doing with your depression since your last visit? No change/ stable    Are you having other symptoms that might be associated with depression? Yes:  having trouble falling asleep    Have you had a significant life event?  No     Are you feeling anxious or having panic attacks?   No    Do you have any concerns with your use of alcohol or other drugs? No    Social History     Tobacco Use     Smoking status: Never Smoker     Smokeless tobacco: Never Used   Substance Use Topics     Alcohol use: Yes     Comment: Maybe a couple glasses of wine every couple of months     Drug use: No     PHQ 12/14/2018 7/29/2019 6/24/2020   PHQ-9 Total Score 0 1 0   Q9: Thoughts of better off dead/self-harm past 2 weeks Not at all Not at all Not at all     CATALINO-7 SCORE 4/24/2017 12/14/2018 7/29/2019   Total Score - - -   Total Score 1 0 1           Suicide Assessment Five-step Evaluation and Treatment (SAFE-T)      How many servings of fruits and vegetables do you eat daily?  2-3    On average, how many sweetened beverages do you drink each day (Examples: soda, juice, sweet tea, etc.  Do NOT count diet or artificially sweetened beverages)?   0    How many days per week do you exercise enough to make your heart beat faster? 3 or less    How many minutes a day do you exercise enough to make your heart beat faster? 30 - 60    How many days per week do you miss taking your medication? 0      Insomnia:  Intermittent problems with insomnia throughout her adult life.  Trazodone works well for her - helps her fall asleep.  No side effects.  Wakes up easily - no residual drowsiness.      S/p gastric bypass 1980  Takes B vitamin daily.                Reviewed and updated as needed this visit by Provider  Tobacco  Allergies  Meds  Problems  Med Hx  Surg Hx  Fam Hx         Review of  Systems   Constitutional, HEENT, cardiovascular, pulmonary, gi and gu systems are negative, except as otherwise noted.      Objective    /70 (BP Location: Right arm)   Pulse 72   Temp 97.4  F (36.3  C) (Tympanic)   Ht 1.524 m (5')   Wt 60.3 kg (133 lb)   SpO2 99%   BMI 25.97 kg/m    Body mass index is 25.97 kg/m .  Physical Exam   GENERAL: healthy, alert and no distress  NECK: no adenopathy, no asymmetry, masses, or scars and thyroid normal to palpation  RESP: lungs clear to auscultation - no rales, rhonchi or wheezes  CV: regular rate and rhythm, normal S1 S2, no S3 or S4, no murmur, click or rub, no peripheral edema and peripheral pulses strong  MS: no gross musculoskeletal defects noted, no edema  PSYCH: mentation appears normal, affect normal/bright            Assessment & Plan       ICD-10-CM    1. Recurrent major depression in complete remission (H)  F33.42 Well controlled.  Continue sertraline (ZOLOFT) 100 MG tablet     2. S/P gastric bypass  Z98.84 In 1980  Will need labs when she comes in for her physical.  Folic Acid-Vit B6-Vit B12 2.5-25-1 MG TABS     3. Psychophysiological insomnia  F51.04 Well controlled.  Continue traZODone (DESYREL) 50 MG tablet     4. Encounter for screening mammogram for breast cancer  Z12.31 *MA Screening Digital Bilateral        BMI:   Estimated body mass index is 25.97 kg/m  as calculated from the following:    Height as of this encounter: 1.524 m (5').    Weight as of this encounter: 60.3 kg (133 lb).           Return in about 3 months (around 9/24/2020) for Physical Exam.    The risks, benefits and treatment options of prescribed medications or other treatments have been discussed with the patient. The patient verbalized their understanding and should call or follow up if no improvement or if they develop further problems.    LUCIANA Merritt [facility]

## 2020-06-24 NOTE — PATIENT INSTRUCTIONS
You are due for a screening Mammogram.  Please contact the Diagnostics Registration Department at: [phone] to schedule this appointment.      Thank you for choosing [facility].  You may be receiving an email and/or telephone survey request from [facility] Customer Experience regarding your visit today.  Please take a few minutes to respond to the survey to let us know how we are doing.      If you have questions or concerns, please contact us via [employer] or you can contact your care team at [phone].    Our Clinic hours are:  Monday 6:40 am  to 7:00 pm  Tuesday -Friday 6:40 am to 5:00 pm    The [place] outpatient lab hours are:  Monday - Friday 6:10 am to 4:45 pm  Saturdays 7:00 am to 11:00 am  Appointments are required, call [phone]    If you have clinical questions after hours or would like to schedule an appointment,  call the clinic at [phone].

## 2020-08-18 ENCOUNTER — TELEPHONE (OUTPATIENT)
Dept: FAMILY MEDICINE | Facility: CLINIC | Age: 70
End: 2020-08-18

## 2020-08-18 NOTE — TELEPHONE ENCOUNTER
Reason for call:  Other   Patient called regarding (reason for call): call back  Additional comments: Patient would like a referral for imaging of her neck and and back. She was advised by her Chiropractor and PT to have one done, she needs one as soon as possible     Phone number to reach patient:  Cell number on file:    Telephone Information:   Mobile [phone]       Best Time:  anytime    Can we leave a detailed message on this number?  YES    Travel screening: Not Applicable

## 2020-08-19 NOTE — TELEPHONE ENCOUNTER
She needs a clinic appointment.  I will need to do an exam prior to ordering imaging  Pam Salinas, CNP

## 2020-08-27 ENCOUNTER — HOSPITAL ENCOUNTER (OUTPATIENT)
Dept: MAMMOGRAPHY | Facility: CLINIC | Age: 70
Discharge: HOME | End: 2020-08-27
Attending: NURSE PRACTITIONER | Admitting: NURSE PRACTITIONER
Payer: COMMERCIAL

## 2020-08-27 ENCOUNTER — OFFICE VISIT (OUTPATIENT)
Dept: FAMILY MEDICINE | Facility: CLINIC | Age: 70
End: 2020-08-27
Payer: COMMERCIAL

## 2020-08-27 ENCOUNTER — ANCILLARY PROCEDURE (OUTPATIENT)
Dept: GENERAL RADIOLOGY | Facility: CLINIC | Age: 70
End: 2020-08-27
Attending: NURSE PRACTITIONER
Payer: COMMERCIAL

## 2020-08-27 VITALS
OXYGEN SATURATION: 98 % | BODY MASS INDEX: 25.91 KG/M2 | HEART RATE: 65 BPM | HEIGHT: 60 IN | DIASTOLIC BLOOD PRESSURE: 60 MMHG | SYSTOLIC BLOOD PRESSURE: 116 MMHG | TEMPERATURE: 97.4 F | WEIGHT: 132 LBS

## 2020-08-27 DIAGNOSIS — Z12.31 ENCOUNTER FOR SCREENING MAMMOGRAM FOR BREAST CANCER: ICD-10-CM

## 2020-08-27 DIAGNOSIS — G89.29 CHRONIC NECK PAIN: Primary | ICD-10-CM

## 2020-08-27 DIAGNOSIS — G89.29 CHRONIC RIGHT-SIDED THORACIC BACK PAIN: ICD-10-CM

## 2020-08-27 DIAGNOSIS — M54.2 CHRONIC NECK PAIN: ICD-10-CM

## 2020-08-27 DIAGNOSIS — M54.2 CHRONIC NECK PAIN: Primary | ICD-10-CM

## 2020-08-27 DIAGNOSIS — G89.29 CHRONIC NECK PAIN: ICD-10-CM

## 2020-08-27 DIAGNOSIS — M54.6 CHRONIC RIGHT-SIDED THORACIC BACK PAIN: ICD-10-CM

## 2020-08-27 PROCEDURE — 99214 OFFICE O/P EST MOD 30 MIN: CPT | Performed by: NURSE PRACTITIONER

## 2020-08-27 PROCEDURE — 77067 SCR MAMMO BI INCL CAD: CPT

## 2020-08-27 PROCEDURE — 72070 X-RAY EXAM THORAC SPINE 2VWS: CPT

## 2020-08-27 PROCEDURE — 72040 X-RAY EXAM NECK SPINE 2-3 VW: CPT

## 2020-08-27 ASSESSMENT — MIFFLIN-ST. JEOR: SCORE: 1050.25

## 2020-08-27 NOTE — PATIENT INSTRUCTIONS
Schedule MRI of neck: [phone]            Thank you for choosing [facility].  You may be receiving an email and/or telephone survey request from [facility] Customer Experience regarding your visit today.  Please take a few minutes to respond to the survey to let us know how we are doing.      If you have questions or concerns, please contact us via [employer] or you can contact your care team at [phone].    Our Clinic hours are:  Monday 6:40 am  to 7:00 pm  Tuesday -Friday 6:40 am to 5:00 pm    The [place] outpatient lab hours are:  Monday - Friday 6:10 am to 4:45 pm  Saturdays 7:00 am to 11:00 am  Appointments are required, call [phone]    If you have clinical questions after hours or would like to schedule an appointment,  call the clinic at [phone].

## 2020-08-27 NOTE — PROGRESS NOTES
Subjective     Carly Gregg is a 69 year old female who presents to clinic today for the following health issues:    HPI       Musculoskeletal problem/pain  Onset/Duration: chronic for years  Description  Location: right side of neck, right upper back and right shoulder  Joint Swelling: no  Redness: no  Pain: YES  Warmth: no  Intensity:  severe  Progression of Symptoms:  Worsening  Any movement with her right arm causes pain  Accompanying signs and symptoms:   Fevers: no  Numbness/tingling/weakness: no  History  Trauma to the area: no  Recent illness:  no  Previous similar problem: no  Previous evaluation:  YES  Precipitating or alleviating factors:  Aggravating factors include: overuse  Therapies tried and outcome: massage, chiropractor and physical therapy - no longer working.  Deep heat rub/cream  Heating pad        Review of Systems   Constitutional, HEENT, cardiovascular, pulmonary, gi and gu systems are negative, except as otherwise noted.      Objective    /60 (BP Location: Right arm)   Pulse 65   Temp 97.4  F (36.3  C) (Tympanic)   Ht 1.524 m (5')   Wt 59.9 kg (132 lb)   SpO2 98%   BMI 25.78 kg/m    Body mass index is 25.78 kg/m .  Physical Exam   GENERAL: healthy, alert and no distress  MS: Neck, back and shoulders: appearance normal. No tenderness to touch. Strength in BUE 5/5. DTRs 2/4. Pulses +2. ROM in shoulders and elbows are normal. Neck ROM is normal but caused pain in the right neck and shoulder.    Xray independently reviewed, degenerative changes seen. Radiologist read pending.          Assessment & Plan     Chronic neck pain  Chronic neck pain for years - now having more radicular symptoms.  xrays today showing degenerative changes.  Recommend MRI to further evaluate - plan pending results.    For now, continue stretching exercises.  Tylenol 1000 mg every 8 hours as needed for pain.  May continue heating pad.    - XR Cervical Spine 2/3 Views; Future  - MR Cervical Spine w/o  Contrast; Future    Chronic right-sided thoracic back pain  - XR Thoracic Spine 2 Views; Future           Return in about 2 weeks (around 9/10/2020).    The risks, benefits and treatment options of prescribed medications or other treatments have been discussed with the patient. The patient verbalized their understanding and should call or follow up if no improvement or if they develop further problems.    LUCIANA Merritt [facility]

## 2020-08-31 ENCOUNTER — HOSPITAL ENCOUNTER (OUTPATIENT)
Dept: MRI IMAGING | Facility: CLINIC | Age: 70
Discharge: HOME | End: 2020-08-31
Attending: NURSE PRACTITIONER | Admitting: NURSE PRACTITIONER
Payer: COMMERCIAL

## 2020-08-31 DIAGNOSIS — G89.29 CHRONIC NECK PAIN: ICD-10-CM

## 2020-08-31 DIAGNOSIS — M54.2 CHRONIC NECK PAIN: ICD-10-CM

## 2020-08-31 PROCEDURE — 72141 MRI NECK SPINE W/O DYE: CPT

## 2020-09-02 ENCOUNTER — TELEPHONE (OUTPATIENT)
Dept: FAMILY MEDICINE | Facility: CLINIC | Age: 70
End: 2020-09-02

## 2020-09-02 NOTE — TELEPHONE ENCOUNTER
Patient calling back regarding her Imaging results, please call her at work anytime between 10am-5pm Thu, 9/3/2020   No

## 2020-09-03 DIAGNOSIS — M54.12 CERVICAL RADICULOPATHY: Primary | ICD-10-CM

## 2020-09-08 ENCOUNTER — TELEPHONE (OUTPATIENT)
Dept: PALLIATIVE MEDICINE | Facility: CLINIC | Age: 70
End: 2020-09-08

## 2020-09-08 NOTE — TELEPHONE ENCOUNTER
"Screening Questions for Radiology Injections:    Injection to be done at which interventional clinic site? [facility] [place]    If YES, let patient know that someone will call them to schedule the COVID-19 test. Route to nursing to enter order.     Instruct patient to arrive as directed prior to the scheduled appointment time:    [place] minutes before      [place]: 30 minutes before; if IV needed 1 hour before     Dr. Geiger-no IV needed for Cervical ROZ; please instruct to arrive 30\" early    Procedure ordered by Brad    Procedure ordered? BRAN    As a reminder, receiving steroids can decrease your body's ability to fight infection.   Would you still like to move forward with scheduling the injection?  Yes      Transforaminal Cervical ROZ - no pain provider currently performing    What insurance would patient like us to bill for this procedure? [place]      Worker's comp or MVA (motor vehicle accident) -Any injection DO NOT SCHEDULE and route to Kailee Fallon.      [employer] insurance - For SI joint injections, DO NOT SCHEDULE and route Kailee Fallon.       [employer] - Any injection besides hip/shoulder/knee joint DO NOT SCHEDULE and route to Kailee Fallon. She will obtain PA and call pt back to schedule procedure or notify pt of denial.       [employer]-Route to [place] for review      **BCBS- ALL need to be routed to [place] for review if a PA is needed**      IF SCHEDULING IN [place] AND NEEDS A PA, IT IS OKAY TO SCHEDULE. [place] HANDLES THEIR OWN PA'S AFTER THE PATIENT IS SCHEDULED. PLEASE SCHEDULE AT LEAST 1 WEEK OUT SO A PA CAN BE OBTAINED.    Any chance of pregnancy? NO   If YES, do NOT schedule and route to RN [place]    Is an  needed? No     Patient has a drive home? (mandatory) YES: Informed    Is patient taking any blood thinners (i.e. plavix, coumadin, jantoven, warfarin, heparin, pradaxa or dabigatran, etc)? No   If hold needed, do NOT schedule, route to RN pool     Is " patient taking any aspirin products (includes Excedrin and Fiorinal)? No     If more than 325mg/day, OK to schedule; Instruct pt to decrease to less than 325 mg for 7 days AND route to RN pool    For CERVICAL procedures, hold all aspirin products for 6 days.     Tell pt that if aspirin product is not held for 6 days, the procedure WILL BE cancelled.      Does the patient have a bleeding or clotting disorder? No     If YES, okay to schedule AND route to RN nurse pool    For any patients with platelet count <100, must be forwarded to provider    Is patient diabetic?  No  If YES, instruct them to bring their glucometer.    Does patient have an active infection or treated for one within the past week? No     Is patient currently taking any antibiotics?  No     For patients on chronic, preventative, or prophylactic antibiotics, procedures may be scheduled.     For patients on antibiotics for active or recent infection:antibiotic course must have been completed for 4 days    Is patient currently taking any steroid medications? (i.e. Prednisone, Medrol)  No     For patients on steroid medications, course must have been completed for 4 days    Is patient actively being treated for cancer or immunocompromised? No  If YES, do NOT schedule and route to RN pool     Are you able to get on and off an exam table with minimal or no assistance? Yes  If NO, do NOT schedule and route to RN pool    Are you able to roll over and lay on your stomach with minimal or no assistance? Yes  If NO, do NOT schedule and route to RN pool     Any allergies to contrast dye, iodine, shellfish, or numbing and steroid medications? No  If YES, route to RN pool AND add allergy information to appointment notes    Allergies: Morphine; Nsaids; Penicillins; and Novocain [procaine hcl]      Has the patient had a flu shot or any other vaccinations within 7 days before or after the procedure.  No     Does patient have an MRI/CT?  YES: 2020  Check Procedure  Scheduling Grid to see if required.      Was the MRI done within the last 3 years?  Yes    If yes, where was the MRI done i.e.[facility], [facility], [facility], [facility] etc? [facility]      If no, do not schedule and route to RN pool    If MRI was not done at [facility], [facility] or [facility] do NOT schedule and route to RN pool.      If pt has an imaging disc, the injection MAY be scheduled but pt has to bring disc to appt.     If they show up without the disc the injection cannot be done    Procedure Specific Instructions:      If celiac plexus block, informed patient NPO for 6 hours and that it is okay to take medications with sips of water, especially blood pressure medications  Not Applicable         If this is for a cervical procedure, informed patient that aspirin needs to be held for 6 days.   YES: Informed      If IV needed:    Do not schedule procedures requiring IV placement in the first appointment of the day or first appointment after lunch. Do NOT schedule at 0745, 0815 or 1245.     Instructed pt to arrive 30 minutes early for IV start if required. (Check Procedure Scheduling Grid)  YES: Informed    Reminders:      If you are started on any steroids or antibiotics between now and your appointment, you must contact us because the procedure may need to be cancelled.  Yes      For all procedures except radiofrequency ablations (RFAs) and spinal cord stimulator (SCS) trials, informed patient:    IV sedation is not provided for this procedure.  If you feel that an oral anti-anxiety medication is needed, you can discuss this further with your referring provider or primary care provider.  The Pain Clinic provider will discuss specifics of what the procedure includes at your appointment.  Most procedures last 10-20 minutes.  We use numbing medications to help with any discomfort during the procedure.  Not Applicable      For patients 85 or older we recommend having an adult stay w/ them for the remainder of  the day.       Does the patient have any questions?  NO  Lydia Elaine  [facility]

## 2020-09-11 ENCOUNTER — RADIOLOGY INJECTION OFFICE VISIT (OUTPATIENT)
Dept: PALLIATIVE MEDICINE | Facility: CLINIC | Age: 70
End: 2020-09-11
Payer: COMMERCIAL

## 2020-09-11 ENCOUNTER — ANCILLARY PROCEDURE (OUTPATIENT)
Dept: RADIOLOGY | Facility: CLINIC | Age: 70
End: 2020-09-11
Attending: PAIN MEDICINE
Payer: COMMERCIAL

## 2020-09-11 VITALS
SYSTOLIC BLOOD PRESSURE: 126 MMHG | HEART RATE: 68 BPM | RESPIRATION RATE: 16 BRPM | OXYGEN SATURATION: 99 % | DIASTOLIC BLOOD PRESSURE: 69 MMHG

## 2020-09-11 DIAGNOSIS — M54.12 CERVICAL RADICULOPATHY: ICD-10-CM

## 2020-09-11 PROCEDURE — 62321 NJX INTERLAMINAR CRV/THRC: CPT | Performed by: PAIN MEDICINE

## 2020-09-11 ASSESSMENT — PAIN SCALES - GENERAL: PAINLEVEL: SEVERE PAIN (6)

## 2020-09-11 NOTE — NURSING NOTE
Discharge Information    IV Discontiued Time:  NA    Amount of Fluid Infused:  NA    Discharge Criteria = When patient returns to baseline or as per MD order    Consciousness:  Pt is fully awake    Circulation:  BP +/- 20% of pre-procedure level    Respiration:  Patient is able to breathe deeply    O2 Sat:  Patient is able to maintain O2 Sat >92% on room air    Activity:  Moves 4 extremities on command    Ambulation:  Patient is able to stand and walk or stand and pivot into wheelchair    Dressing:  Clean/dry or No Dressing    Notes:   Discharge instructions and AVS given to patient    Patient meets criteria for discharge?  YES    Admitted to PCU?  No    Responsible adult present to accompany patient home?  Yes    Signature/Title:    Fransisco Rock RN  RN Care Coordinator  [facility] [place]

## 2020-09-11 NOTE — PROGRESS NOTES
[facility] - Procedure Note    Date of Visit: 9/11/2020    Procedure performed: C7-T1 interlaminar epidural steroid injection with fluoroscopic guidance  Diagnosis: Cervical spondylosis; Cervical radiculitis/radiculopathy  : Justin Omer MD  Anesthesia: none    Indications: Carly Gregg is a 70 year old female who is seen  for cervical epidural steroid injection. The patient describes right-sided neck pain radiating to her right upper extremity. The patient has been exhibiting symptoms consistent with cervical intraspinal inflammation and radiculopathy. Symptoms have been persistent, disabling, and intermittently severe. The patient reports minimal improvement with conservative treatment, including meds/PT.    Cervical MRI   appreciated within the visualized spinal cord.     Level by level as follows:      C2-C3: No loss of disc height. No significant disc herniation. Normal  facets. No spinal canal or neural foraminal narrowing.      C3-C4: Grade 1 anterolisthesis with uncovering of the disc. Mild loss  of disc height and signal. Right foraminal disc protrusion with right  uncinate spurring. Marked asymmetric right-sided facet hypertrophy.  Mild spinal canal narrowing. Severe right neural foraminal narrowing.  No significant left neural foraminal narrowing.     C4-C5: Grade 1 anterolisthesis with uncovering of the disc. Moderate  loss of disc height and signal. Posterior disc bulge with right  greater than left uncinate spurring. Marked right-sided facet  hypertrophy. Mild central spinal canal narrowing. Moderate to severe  right neural foraminal narrowing. Moderate left neural foraminal  narrowing.      C5-C6: Grade 1 anterolisthesis with uncovering of the disc. Moderate  loss of disc height and signal. Posterior disc bulge with uncinate  spurring. No significant facet hypertrophy. No significant spinal  canal narrowing. No neural foraminal narrowing.      C6-C7: Marked  loss of disc height and signal. Circumferential disc  bulge with endplate osteophytic spurring. Normal facets. No spinal  canal narrowing. Moderate bilateral neural foraminal narrowing.      C7-T1: No loss of disc height. No significant disc herniation. Normal  facets. No spinal canal or neural foraminal narrowing.      Paraspinous soft tissues are unremarkable.                                                                       IMPRESSION:    1. Multilevel degenerative changes throughout the cervical spine with  grade 1 anterolisthesis of C3 on C4, C4 on C5, and C5 on C6.  2. Mild spinal canal narrowing at C3-4 and C4-5.  3. Multiple levels of neural foraminal narrowing, most pronounced on  the right at C3-4 and C4-5.  4. Asymmetric right-sided facet hypertrophy at C3-4 and C4-5.     Allergies:      Allergies   Allergen Reactions     Morphine Other (See Comments)     Morphine injection given for Hemorid infection.  Patient says she had trouble breathing and her heart raced.     Nsaids      Fainting and hospitalization.     Penicillins Unknown     Novocain [Procaine Hcl] Rash     Can do lidocaine for iv start        Vitals:  There were no vitals taken for this visit.    Review of Systems: The patient denies recent fever, chills, illness, use of antibiotics or anticoagulants. All other 10-point review of systems negative.     Procedure: The procedure and risks were explained, and informed written consent was obtained from the patient. Risks include but are not limited to: infection, bleeding, increased pain, and damage to soft tissue, nerve, muscle, and vasculature structures. After getting informed consent, patient was brought into the procedure suite and was placed in a prone position on the procedure table. A Pause for the Cause was performed. Patient was prepped and draped in sterile fashion.     The C7-T1 interspace was identified with use of fluoroscopy in AP view. A 25-gauge, 1.5 inch needle was used to  anesthetize the skin and subcutaneous tissue entry site with a total of 2 ml of 1% lidocaine. Under fluoroscopic visualization, a 22-gauge, 3.5 inch Tuohy epidural needle was slowly advanced towards the epidural space a few millimeters right of midline. The latter part of the needle advancement was guided with fluoroscopy in the lateral view. The epidural space was identified using loss of resistance technique. After negative aspiration for heme and cerebrospinal fluid, a total of 1 mL of Omnipaque was injected to confirm needle placement. 9 mL of contrast was wasted. Epidurogram confirmed spread within the posterior epidural space. 2 ml of 10mg/ml of dexamethasone and 1 ml of preservative free 0.25% bupivacaine was injected. The needle was removed.  Images were saved to PACS.    The patient tolerated the procedure well, and there was no evidence of procedural complications. No new sensory or motor deficits were noted following the procedure. The patient was stable and able to ambulate on discharge home. Post-procedure instructions were provided.     Pre-procedure pain score: 8/10 in the neck, 8/10 in the arm  Post-procedure pain score: 2/10 in the neck, 2/10 in the arm    Assessment/Plan: Carly Gregg is a 70 year old female s/p cervical interlaminar epidural steroid injection today for cervical spondylosis and radiculitis/radiculopathy.     1. Following today's procedure, the patient was advised to contact the [facility] for any of the following:   Fever, chills, or night sweats   New onset of pain, numbness, or weakness   Any questions/concerns regarding the procedure  If unable to contact the Pain Center, the patient was instructed to go to a local Emergency Room for any complications.   2. The patient will receive a follow-up call in 1 week.   3. Follow-up with referring provider in 2 weeks for post-procedure evaluation.    Justin Omer, [facility]

## 2020-09-11 NOTE — NURSING NOTE
Pre-procedure Intake    Have you been fasting? NA    If yes, for how long? No     Are you taking a prescribed blood thinner such as coumadin, Plavix, Xarelto?    No    If yes, when did you take your last dose? No     Do you take aspirin?  No    If cervical procedure, have you held aspirin for 6 days?   No     Do you have any allergies to contrast dye, iodine, steroid and/or numbing medications?  YES: Iodine     Are you currently taking antibiotics or have an active infection?  NO    Have you had a fever/elevated temperature within the past week? NO    Are you currently taking oral steroids? NO    Do you have a ? Yes       Are you pregnant or breastfeeding?  NO    Are the vital signs normal?  Yes  Jesus Bourne MA

## 2020-09-11 NOTE — PATIENT INSTRUCTIONS
[facility]   Procedure Discharge Instructions    Today you saw:  Dr. Justin Omer      You had an:  Epidural steroid injection         If you were holding your blood thinning medication, please restart taking it: N/A    Be cautious Numbness and/or weakness may occur for up to 6-8 hours after the procedure due to effect of the local anesthetic    Do not drive for 6 hours. The effect of the local anesthetic could slow your reflexes.     You may resume your regular activities after 24 hours    Avoid strenuous activity for the first 24 hours    You may shower, however avoid swimming, tub baths or hot tubs for 24 hours following your procedure    You may have a mild to moderate increase in pain for several days following the injection.    It may take up to 14 days for the steroid medication to start working although you may feel the effect as early as a few days after the procedure.       You may use ice packs for 10-15 minutes, 3 to 4 times a day at the injection site for comfort    Do not use heat to painful areas for 6 to 8 hours. This will give the local anesthetic time to wear off and prevent you from accidentally burning your skin.     Unless you have been directed to avoid the use of anti-inflammatory medications (NSAIDS), you may use medications such as ibuprofen, Aleve or Tylenol for pain control if needed.     Possible side effects of steroids that you may experience include flushing, elevated blood pressure, increased appetite, mild headaches and restlessness.  All of these symptoms will get better with time.    If you experience any of the following, call the Pain Clinic during work hours (Mon-Friday 8-4:30 pm) at [phone] or the Provider Line after hours at [phone]:  -Fever over 100 degree F  -Swelling, bleeding, redness, drainage, warmth at the injection site  -Progressive weakness or numbness in your arms  -Unusual headache that is not relieved by Tylenol or other  pain reliever  -Unusual new onset of pain that is not improving

## 2020-10-07 ENCOUNTER — OFFICE VISIT (OUTPATIENT)
Dept: FAMILY MEDICINE | Facility: CLINIC | Age: 70
End: 2020-10-07
Payer: COMMERCIAL

## 2020-10-07 VITALS
BODY MASS INDEX: 26.56 KG/M2 | OXYGEN SATURATION: 99 % | TEMPERATURE: 97.7 F | SYSTOLIC BLOOD PRESSURE: 132 MMHG | HEART RATE: 68 BPM | RESPIRATION RATE: 16 BRPM | DIASTOLIC BLOOD PRESSURE: 64 MMHG | WEIGHT: 136 LBS

## 2020-10-07 DIAGNOSIS — M54.6 CHRONIC RIGHT-SIDED THORACIC BACK PAIN: Primary | ICD-10-CM

## 2020-10-07 DIAGNOSIS — M47.24 OSTEOARTHRITIS OF SPINE WITH RADICULOPATHY, THORACIC REGION: ICD-10-CM

## 2020-10-07 DIAGNOSIS — G89.29 CHRONIC RIGHT-SIDED THORACIC BACK PAIN: Primary | ICD-10-CM

## 2020-10-07 PROCEDURE — 99214 OFFICE O/P EST MOD 30 MIN: CPT | Performed by: NURSE PRACTITIONER

## 2020-10-07 RX ORDER — TRAMADOL HYDROCHLORIDE 50 MG/1
25-50 TABLET ORAL 2 TIMES DAILY PRN
Qty: 14 TABLET | Refills: 0 | Status: SHIPPED | OUTPATIENT
Start: 2020-10-07 | End: 2020-10-08

## 2020-10-07 RX ORDER — GABAPENTIN 100 MG/1
100 CAPSULE ORAL 3 TIMES DAILY
Qty: 90 CAPSULE | Refills: 0 | Status: SHIPPED | OUTPATIENT
Start: 2020-10-07 | End: 2020-10-08

## 2020-10-07 NOTE — PROGRESS NOTES
Subjective     Carly Gregg is a 70 year old female who presents to clinic today for the following health issues:    Chief Complaint   Patient presents with     Back Pain     She is requesting  something for pain today, is leaving for [place] next week for 2 1/2 weeks.          HPI         Back Pain  Onset/Duration: 2 years, worsening in the past year   Description:   Location of pain: middle of back right   Character of pain: sharp  Pain radiation: to her left hip at times   New numbness or weakness in legs, not attributed to pain: no   Intensity: Currently 9/10  Progression of Symptoms: worsening  History:   Specific cause: none  Pain interferes with job: YES  History of back problems: YES   Any previous MRI or X-rays: YES- both   Sees a specialist for back pain: No- did get a steroid injection on 9/11- States it only helped her neck and shoulder area. She did do PT- states it wasn't helpful.   Alleviating factors:   Improved by: none     Precipitating factors:  Worsened by: anytime she has to use her right arm, sitting.   Therapies tried and outcome: tylenol 600mg- not helpful, ice, heat, wearing a back brace.     Accompanying Signs & Symptoms:  Risk of Fracture: None  Risk of Cauda Equina: None  Risk of Infection: None  Risk of Cancer: None  Risk of Ankylosing Spondylitis: Onset at age <35, male, AND morning back stiffness YES        Review of Systems   Constitutional, HEENT, cardiovascular, pulmonary, gi and gu systems are negative, except as otherwise noted.      Objective    /64 (BP Location: Right arm, Patient Position: Sitting, Cuff Size: Adult Regular)   Pulse 68   Temp 97.7  F (36.5  C) (Tympanic)   Resp 16   Wt 61.7 kg (136 lb)   SpO2 99%   BMI 26.56 kg/m    Body mass index is 26.56 kg/m .  Physical Exam   GENERAL: healthy, alert and no distress  MS: thoracic spine non-tender, there is right side middle back tenderness, no edema and deformities noted   SKIN: no suspicious lesions or  rashes  NEURO: Normal strength and tone, mentation intact and speech normal  PSYCH: mentation appears normal, affect normal/bright            Assessment & Plan     Chronic right-sided thoracic back pain    - gabapentin (NEURONTIN) 100 MG capsule; Take 1 capsule (100 mg) by mouth 3 times daily  - MR Thoracic Spine w/o Contrast; Future  - traMADol (ULTRAM) 50 MG tablet; Take 0.5-1 tablets (25-50 mg) by mouth 2 times daily as needed for severe pain  -follow up with PCP in 2-3 weeks     Osteoarthritis of spine with radiculopathy, thoracic region    - MR Thoracic Spine w/o Contrast; Future  - traMADol (ULTRAM) 50 MG tablet; Take 0.5-1 tablets (25-50 mg) by mouth 2 times daily as needed for severe pain        See Patient Instructions      LUCIANA Roberts [facility]

## 2020-10-07 NOTE — PATIENT INSTRUCTIONS
Tramadol 1 tablet twice daily as needed for severe pain    Gabapentin 100 mg 3 times daily, should help with sharp shooting pains     Schedule thoracic spine MRI

## 2020-10-08 ENCOUNTER — TELEPHONE (OUTPATIENT)
Dept: FAMILY MEDICINE | Facility: CLINIC | Age: 70
End: 2020-10-08

## 2020-10-08 DIAGNOSIS — G89.29 CHRONIC RIGHT-SIDED THORACIC BACK PAIN: ICD-10-CM

## 2020-10-08 DIAGNOSIS — M54.6 CHRONIC RIGHT-SIDED THORACIC BACK PAIN: ICD-10-CM

## 2020-10-08 RX ORDER — GABAPENTIN 300 MG/1
300 CAPSULE ORAL 3 TIMES DAILY
Qty: 90 CAPSULE | Refills: 0 | Status: SHIPPED | OUTPATIENT
Start: 2020-10-08 | End: 2020-10-29

## 2020-10-08 RX ORDER — OXYCODONE HYDROCHLORIDE 5 MG/1
2.5-5 TABLET ORAL 2 TIMES DAILY PRN
Qty: 8 TABLET | Refills: 0 | Status: SHIPPED | OUTPATIENT
Start: 2020-10-08 | End: 2021-05-28

## 2020-10-08 NOTE — TELEPHONE ENCOUNTER
"Reason for call:  Patient reporting a symptom    Symptom or request: Pt saw Key SOSA in clinic yesterday for back pain.  She states that the \"pain pills\" are not working and she wants a stronger Rx and enough to get her through her upcoming vacation.  Please call patient at work and advise.        Duration (how long have symptoms been present): ongoing    Have you been treated for this before? Yes    Additional comments:     Phone Number patient can be reached at:  Work number on file:  [phone] (work)    Best Time:  any    Can we leave a detailed message on this number:  YES    Call taken on 10/8/2020 at 3:53 PM by Annette Mccollum    "

## 2020-10-08 NOTE — TELEPHONE ENCOUNTER
Patient seen yesterday for back pain, given RX for tramadol and gabapentin.  Requesting stronger med than tramadol - says it dulls the pain and is ok when laying flat, but when gets up to move she has pain again.  Gabapentin helps with the nerve pain.  Also requesting enough to get through 2.5 week vacation to [place] - leaves next week.  She worries about being in [place] and not having enough medication - #14 will not last.    Pharm ready.    Routing to provider.  Felisha PABLO RN

## 2020-10-08 NOTE — TELEPHONE ENCOUNTER
Recommend to increase Gabapentin to 300 mg 3 times daily     I signed small prescription for Oxycodone, patient never had opioids before and she has morphine on her allergy list, I don't feel comfortable signing 2 weeks supply. She can take 2.5-5 mg twice daily as needed for severe pain Patient can have virtual follow up visit with her PCP next week.    Key Natarajan, LUCIANA CNP

## 2020-10-08 NOTE — TELEPHONE ENCOUNTER
Pt notified.  She will try the higher dose of gabapentin and update tomorrow morning.    Cautioned pt an provider noted below regarding the oxycodone.    Pt asks too if she can increase the tramadol from 50 mg tabs?  She says it seems to work but wears off sharply after about 3 hours.    Pt will update in the morning.    Helga Joe RN

## 2020-10-09 RX ORDER — TRAMADOL HYDROCHLORIDE 50 MG/1
50 TABLET ORAL EVERY 6 HOURS PRN
Qty: 50 TABLET | Refills: 0 | Status: SHIPPED | OUTPATIENT
Start: 2020-10-09 | End: 2020-10-29

## 2020-10-09 NOTE — TELEPHONE ENCOUNTER
Can take Tramadol 50 mg every 6-8 hrs as needed, do not take more often than every 6 hrs, I do not recommend this for long term.    Do not take Oxycodone at the same time with Tramadol, at least 4-6 hrs apart.    Follow up with PCP next week before she leaves to [place]     LUCIANA Roberts CNP

## 2020-10-09 NOTE — TELEPHONE ENCOUNTER
Refilled Tramadol    Recommend to keep appointment with PCP when she comes back    LUCIANA Roberts CNP

## 2020-10-09 NOTE — TELEPHONE ENCOUNTER
Pt called back and updates:    1.  She trialed oxycodone last night.  Took 1/2 tab at 7:30 pm. She feel asleep and slept well through the night.  In fact, she slept so well that she did not wake up to change her clothes and woke up still in her clothing at 5:30 this morning.  Pt will continue oxycodone at bedtime only and try to make supply stretch until her return from vacation.    2.  Pt intends to use tramadol 50 mg TID.  She is not using at bedtime and is keeping this dose  by 6 hours from oxycodone.  She currently has 11 remaining tablets.  She asks for 50 additional tabs to get her through her 14 day vacation and until she can see PCP as scheduled on 10/29.  (Leaves on vacation 10/12, returns 10/26, MRI on 10/28, sees Pam 10/29.)    Routed back to provider for consideration.    Helga Joe RN

## 2020-10-09 NOTE — TELEPHONE ENCOUNTER
Left message for patient to return a call to the clinic RN.     Request completed.    ADRIANA Joe RN

## 2020-10-11 ENCOUNTER — TELEPHONE (OUTPATIENT)
Dept: FAMILY MEDICINE | Facility: CLINIC | Age: 70
End: 2020-10-11

## 2020-10-11 NOTE — TELEPHONE ENCOUNTER
Prior Authorization Retail Medication Request    Medication/Dose: tramadol  ICD code (if different than what is on RX):    Previously Tried and Failed:  roxicodone  Rationale:  Patient has used previously with success    Insurance Name:  [phone]  Insurance ID:  [id number]      Pharmacy Information (if different than what is on RX)  Name:    Phone:

## 2020-10-12 NOTE — TELEPHONE ENCOUNTER
Central Prior Authorization Team   Phone: [phone]    PA Initiation    Medication: tramadol  Insurance Company:    Pharmacy Filling the Rx: [id number] - [address] AT [address]  Filling Pharmacy Phone: [phone]  Filling Pharmacy Fax: [phone]  Start Date: 10/12/2020

## 2020-10-28 ENCOUNTER — HOSPITAL ENCOUNTER (OUTPATIENT)
Dept: MRI IMAGING | Facility: CLINIC | Age: 70
Discharge: HOME | End: 2020-10-28
Attending: NURSE PRACTITIONER | Admitting: NURSE PRACTITIONER
Payer: COMMERCIAL

## 2020-10-28 DIAGNOSIS — M54.6 CHRONIC RIGHT-SIDED THORACIC BACK PAIN: ICD-10-CM

## 2020-10-28 DIAGNOSIS — M47.24 OSTEOARTHRITIS OF SPINE WITH RADICULOPATHY, THORACIC REGION: ICD-10-CM

## 2020-10-28 DIAGNOSIS — G89.29 CHRONIC RIGHT-SIDED THORACIC BACK PAIN: ICD-10-CM

## 2020-10-28 PROCEDURE — 72146 MRI CHEST SPINE W/O DYE: CPT

## 2020-10-29 ENCOUNTER — OFFICE VISIT (OUTPATIENT)
Dept: FAMILY MEDICINE | Facility: CLINIC | Age: 70
End: 2020-10-29
Payer: COMMERCIAL

## 2020-10-29 VITALS
WEIGHT: 136 LBS | DIASTOLIC BLOOD PRESSURE: 60 MMHG | SYSTOLIC BLOOD PRESSURE: 116 MMHG | OXYGEN SATURATION: 99 % | TEMPERATURE: 97.4 F | HEIGHT: 60 IN | BODY MASS INDEX: 26.7 KG/M2 | HEART RATE: 81 BPM

## 2020-10-29 DIAGNOSIS — M54.6 CHRONIC RIGHT-SIDED THORACIC BACK PAIN: ICD-10-CM

## 2020-10-29 DIAGNOSIS — G89.29 CHRONIC RIGHT-SIDED THORACIC BACK PAIN: ICD-10-CM

## 2020-10-29 DIAGNOSIS — Z23 NEED FOR PROPHYLACTIC VACCINATION AND INOCULATION AGAINST INFLUENZA: ICD-10-CM

## 2020-10-29 DIAGNOSIS — M54.12 CERVICAL RADICULOPATHY: Primary | ICD-10-CM

## 2020-10-29 PROCEDURE — 90471 IMMUNIZATION ADMIN: CPT | Performed by: NURSE PRACTITIONER

## 2020-10-29 PROCEDURE — 99213 OFFICE O/P EST LOW 20 MIN: CPT | Mod: 25 | Performed by: NURSE PRACTITIONER

## 2020-10-29 PROCEDURE — 90662 IIV NO PRSV INCREASED AG IM: CPT | Performed by: NURSE PRACTITIONER

## 2020-10-29 RX ORDER — GABAPENTIN 300 MG/1
CAPSULE ORAL
Qty: 120 CAPSULE | Refills: 3 | Status: SHIPPED | OUTPATIENT
Start: 2020-10-29 | End: 2021-05-28 | Stop reason: DRUGHIGH

## 2020-10-29 RX ORDER — TRAMADOL HYDROCHLORIDE 50 MG/1
50 TABLET ORAL EVERY 8 HOURS PRN
Qty: 50 TABLET | Refills: 0 | Status: SHIPPED | OUTPATIENT
Start: 2020-10-29 | End: 2021-08-11

## 2020-10-29 ASSESSMENT — MIFFLIN-ST. JEOR: SCORE: 1063.39

## 2020-10-29 NOTE — PROGRESS NOTES
Subjective     Carly Gregg is a 70 year old female who presents to clinic today for the following health issues:    HPI   Chief Complaint   Patient presents with     Results     review MRI from yesterday- thoracic spine     Refill Request     Imm/Inj     Flu Shot             Chronic/Recurring Back Pain Follow Up- refill medication      Where is your back pain located? (Select all that apply) middle of back bilateral and neck right    How would you describe your back pain?  Constant dull ache    Where does your back pain spread? the right shoulder area    Since your last clinic visit for back pain, how has your pain changed?  After cortisone shot neck pain was really good-  But only for one month    Medication is helping control pain but doesn't want to stay on this    Does your back pain interfere with your job? YES    Since your last visit, have you tried any new treatment? No    Using gabapentin 300 mg TID and tramadol 50 mg TID with good pain relief        Review of Systems   Constitutional, HEENT, cardiovascular, pulmonary, gi and gu systems are negative, except as otherwise noted.      Objective    /60 (BP Location: Right arm)   Pulse 81   Temp 97.4  F (36.3  C) (Tympanic)   Ht 1.524 m (5')   Wt 61.7 kg (136 lb)   SpO2 99%   BMI 26.56 kg/m    Body mass index is 26.56 kg/m .  Physical Exam   GENERAL: healthy, alert and no distress  PSYCH: mentation appears normal, affect normal/bright            Assessment & Plan     Cervical radiculopathy  - gabapentin (NEURONTIN) 300 MG capsule; One capsule in the AM, one capsule at noon, two capsules at bedtime.  - traMADol (ULTRAM) 50 MG tablet; Take 1 tablet (50 mg) by mouth every 8 hours as needed for severe pain  - Orthopedic & Spine  Referral; Future    Chronic right-sided thoracic back pain  - gabapentin (NEURONTIN) 300 MG capsule; One capsule in the AM, one capsule at noon, two capsules at bedtime.  - traMADol (ULTRAM) 50 MG tablet; Take 1  tablet (50 mg) by mouth every 8 hours as needed for severe pain  - Orthopedic & Spine  Referral; Future    Need for prophylactic vaccination and inoculation against influenza  - FLUZONE HIGH DOSE 65+  [32124]          Chronic neck and thoracic pain.  Neck injection only helped for one month.  Gabapentin and tramadol working well together - patient understands that tramadol isn't a long term solution.    Recommend referral to spine specialist.  Increase gabapentin to 300 - 300 - 600 mg.  Refills tramadol, but advised her to start using it prn, not scheduled. Advised not to drive when taking the tramadol.            Return in about 4 weeks (around 11/26/2020).    The risks, benefits and treatment options of prescribed medications or other treatments have been discussed with the patient. The patient verbalized their understanding and should call or follow up if no improvement or if they develop further problems.    LUCIANA Merritt [facility]

## 2020-11-16 ENCOUNTER — HEALTH MAINTENANCE LETTER (OUTPATIENT)
Age: 70
End: 2020-11-16

## 2020-11-16 ENCOUNTER — OFFICE VISIT (OUTPATIENT)
Dept: NEUROSURGERY | Facility: CLINIC | Age: 70
End: 2020-11-16
Attending: NURSE PRACTITIONER
Payer: COMMERCIAL

## 2020-11-16 VITALS
HEART RATE: 71 BPM | TEMPERATURE: 98 F | WEIGHT: 133 LBS | SYSTOLIC BLOOD PRESSURE: 131 MMHG | HEIGHT: 61 IN | DIASTOLIC BLOOD PRESSURE: 76 MMHG | OXYGEN SATURATION: 100 % | BODY MASS INDEX: 25.11 KG/M2

## 2020-11-16 DIAGNOSIS — M54.6 CHRONIC RIGHT-SIDED THORACIC BACK PAIN: ICD-10-CM

## 2020-11-16 DIAGNOSIS — M54.12 CERVICAL RADICULOPATHY: ICD-10-CM

## 2020-11-16 DIAGNOSIS — G89.29 CHRONIC RIGHT-SIDED THORACIC BACK PAIN: ICD-10-CM

## 2020-11-16 PROCEDURE — 99203 OFFICE O/P NEW LOW 30 MIN: CPT | Performed by: NEUROLOGICAL SURGERY

## 2020-11-16 ASSESSMENT — PAIN SCALES - GENERAL: PAINLEVEL: NO PAIN (0)

## 2020-11-16 ASSESSMENT — MIFFLIN-ST. JEOR: SCORE: 1065.66

## 2020-11-16 NOTE — LETTER
11/16/2020         RE: Carly Gregg  [address] [address] [id number]        Dear Colleague,    Thank you for referring your patient, Carly Gregg, to the [facility] [place]. Please see a copy of my visit note below.    I was asked by Dr. Salinas to see this patient in consultation    70F w/ right mid-back pain, right neck and shoulder pain.  More than 6 months of daily, throbbing, mid-back pain, on the right side just below the bra line.  Also with right neck pain and shoulder pain without radiation to the arm.  MR Thoracic with disc degeneration, no stenosis.  MR Cervical with reversal of lordosis, multi-level anterolisthesis, and right C3-4 and C4-5 foraminal stenosis.  Underwent C7-T1 ROZ with temporary relief of neck/shoulder pain, no relief of mid back pain.  Therapy and Chiropractic care without durable relief.       Past Medical History:   Diagnosis Date     B12 deficiency      Insomnia      Mood swings      Past Surgical History:   Procedure Laterality Date     BIOPSY  2008     CHOLECYSTECTOMY  1980     COLONOSCOPY  2003?     COLONOSCOPY N/A 8/10/2018    Procedure: COLONOSCOPY;  colonoscopy;  Surgeon: Phillip Robbins MD;  Location: [facility]     ESOPHAGOSCOPY, GASTROSCOPY, DUODENOSCOPY (EGD), COMBINED N/A 6/24/2016    Procedure: COMBINED ESOPHAGOSCOPY, GASTROSCOPY, DUODENOSCOPY (EGD);  Surgeon: Rivas Jade MD;  Location: [facility]     GASTRIC BYPASS  1980     HEAD & NECK SURGERY  5 in the past 12 mos    Dental Implant Surgery / Bone added     TONSILLECTOMY  age 29     TRANSPLANT      Dental Implants ongoing since 2/8/2015     Social History     Socioeconomic History     Marital status:      Spouse name: Not on file     Number of children: Not on file     Years of education: Not on file     Highest education level: Not on file   Occupational History     Not on file   Social Needs     Financial resource strain: Not on file      Food insecurity     Worry: Not on file     Inability: Not on file     Transportation needs     Medical: Not on file     Non-medical: Not on file   Tobacco Use     Smoking status: Never Smoker     Smokeless tobacco: Never Used   Substance and Sexual Activity     Alcohol use: Yes     Comment: Maybe a couple glasses of wine every couple of months     Drug use: No     Sexual activity: Not Currently     Birth control/protection: None   Lifestyle     Physical activity     Days per week: Not on file     Minutes per session: Not on file     Stress: Not on file   Relationships     Social connections     Talks on phone: Not on file     Gets together: Not on file     Attends Restoration service: Not on file     Active member of club or organization: Not on file     Attends meetings of clubs or organizations: Not on file     Relationship status: Not on file     Intimate partner violence     Fear of current or ex partner: Not on file     Emotionally abused: Not on file     Physically abused: Not on file     Forced sexual activity: Not on file   Other Topics Concern     Parent/sibling w/ CABG, MI or angioplasty before 65F 55M? No      Service No     Blood Transfusions No     Caffeine Concern Yes     Comment: 4 cups a day     Occupational Exposure No     Hobby Hazards No     Sleep Concern No     Stress Concern No     Weight Concern Yes     Comment: moved back to [place] gained 10 lbs     Special Diet Yes     Comment: folbee, iron and calcium     Back Care Yes     Comment: ciropractor 3-6 months, back neck and shoulders, left sciatic     Exercise Yes     Comment: joined gymn last week     Bike Helmet No     Seat Belt Yes     Self-Exams Not Asked   Social History Narrative     Not on file     Family History   Problem Relation Age of Onset     Cancer Mother         stomach cancer     Cardiovascular Mother         MI     Diabetes Mother      Obesity Mother      C.A.D. Maternal Grandfather         bypass     Genitourinary Problems  "Brother         kidney transplant     Heart Disease Sister         heart murmur     Heart Disease Sister         heart murmur     Cancer Father         bone and blood cancer  6 months later     Other Cancer Father          14     Suicide Maternal Grandmother         Fransisco     Cerebrovascular Disease Maternal Grandmother      Coronary Artery Disease Paternal Grandfather      Suicide Brother         Christian     Hypertension Brother      Hyperlipidemia Brother      Obesity Brother      Depression Brother         Suicide     Depression Brother         Suicide     Obesity Sister      Obesity Sister      Obesity Sister      Obesity Brother         ROS: 10 point ROS neg other than the symptoms noted above in the HPI.    Physical Exam  /76 (BP Location: Right arm, Patient Position: Sitting)   Pulse 71   Temp 98  F (36.7  C)   Ht 1.549 m (5' 1\")   Wt 60.3 kg (133 lb)   SpO2 100%   BMI 25.13 kg/m    HEENT:  Normocephalic, atraumatic.  PERRLA.  EOM s intact.  Visual fields full to gross exam  Neck:  Supple, non-tender, without lymphadenopathy.  Heart:  No peripheral edema  Lungs:  No SOB  Abdomen:  Non-distended.   Skin:  Warm and dry.  Extremities:  No edema, cyanosis or clubbing.  Psychiatric:  No apparent distress  Musculoskeletal:  Normal bulk and tone    NEUROLOGICAL EXAMINATION:     Mental status:  Alert and Oriented x 3, speech is fluent.  Cranial nerves:  II-XII intact.   Motor:    Shoulder Abduction:  Right:  5/5   Left:  5/5  Biceps:                      Right:  5/5   Left:  5/5  Triceps:                     Right:  5/5   Left:  5/5  Wrist Extensors:       Right:  5/5   Left:  5/5  Wrist Flexors:           Right:  5/5   Left:  5/5  interosseus :            Right:  5/5   Left:  5/5  Hip Flexion:                Right: 5/5  Left:  5/5  Quadriceps:             Right:  5/5  Left:  5/5  Hamstrings:             Right:  5/5  Left:  5/5  Gastroc Soleus:        Right:  5/5  Left:  5/5  Tib/Ant:            "           Right:  5/5  Left:  5/5  EHL:                     Right:  5/5  Left:  5/5  Sensation:  Intact  Reflexes:  Negative Babinski.  Negative Clonus.  Negative Limon's.  Coordination:  Smooth finger to nose testing.   Negative pronator drift.  Smooth tandem walking.    A/P:  70F w/ right mid-back pain, right neck and shoulder pain    I had a discussion with the patient, reviewing the history, symptoms, and imaging  Discussed that given that the worst of her pain is in the mid-back, that should first be an area of focus prior to interventions focused on the neck  Will refer to Pain Clinic to discuss injection options for the mid-back         Again, thank you for allowing me to participate in the care of your patient.        Sincerely,        Phuc Mueller MD

## 2020-11-16 NOTE — PROGRESS NOTES
I was asked by Dr. Salinas to see this patient in consultation    70F w/ right mid-back pain, right neck and shoulder pain.  More than 6 months of daily, throbbing, mid-back pain, on the right side just below the bra line.  Also with right neck pain and shoulder pain without radiation to the arm.  MR Thoracic with disc degeneration, no stenosis.  MR Cervical with reversal of lordosis, multi-level anterolisthesis, and right C3-4 and C4-5 foraminal stenosis.  Underwent C7-T1 ROZ with temporary relief of neck/shoulder pain, no relief of mid back pain.  Therapy and Chiropractic care without durable relief.       Past Medical History:   Diagnosis Date     B12 deficiency      Insomnia      Mood swings      Past Surgical History:   Procedure Laterality Date     BIOPSY  2008     CHOLECYSTECTOMY  1980     COLONOSCOPY  2003?     COLONOSCOPY N/A 8/10/2018    Procedure: COLONOSCOPY;  colonoscopy;  Surgeon: Phillip Robbins MD;  Location: [facility]     ESOPHAGOSCOPY, GASTROSCOPY, DUODENOSCOPY (EGD), COMBINED N/A 6/24/2016    Procedure: COMBINED ESOPHAGOSCOPY, GASTROSCOPY, DUODENOSCOPY (EGD);  Surgeon: Rivas Jade MD;  Location: [facility]     GASTRIC BYPASS  1980     HEAD & NECK SURGERY  5 in the past 12 mos    Dental Implant Surgery / Bone added     TONSILLECTOMY  age 29     TRANSPLANT      Dental Implants ongoing since 2/8/2015     Social History     Socioeconomic History     Marital status:      Spouse name: Not on file     Number of children: Not on file     Years of education: Not on file     Highest education level: Not on file   Occupational History     Not on file   Social Needs     Financial resource strain: Not on file     Food insecurity     Worry: Not on file     Inability: Not on file     Transportation needs     Medical: Not on file     Non-medical: Not on file   Tobacco Use     Smoking status: Never Smoker     Smokeless tobacco: Never Used   Substance and Sexual Activity     Alcohol use: Yes      Comment: Maybe a couple glasses of wine every couple of months     Drug use: No     Sexual activity: Not Currently     Birth control/protection: None   Lifestyle     Physical activity     Days per week: Not on file     Minutes per session: Not on file     Stress: Not on file   Relationships     Social connections     Talks on phone: Not on file     Gets together: Not on file     Attends Restorationist service: Not on file     Active member of club or organization: Not on file     Attends meetings of clubs or organizations: Not on file     Relationship status: Not on file     Intimate partner violence     Fear of current or ex partner: Not on file     Emotionally abused: Not on file     Physically abused: Not on file     Forced sexual activity: Not on file   Other Topics Concern     Parent/sibling w/ CABG, MI or angioplasty before 65F 55M? No      Service No     Blood Transfusions No     Caffeine Concern Yes     Comment: 4 cups a day     Occupational Exposure No     Hobby Hazards No     Sleep Concern No     Stress Concern No     Weight Concern Yes     Comment: moved back to [place] gained 10 lbs     Special Diet Yes     Comment: folbee, iron and calcium     Back Care Yes     Comment: ciropractor 3-6 months, back neck and shoulders, left sciatic     Exercise Yes     Comment: joined gymn last week     Bike Helmet No     Seat Belt Yes     Self-Exams Not Asked   Social History Narrative     Not on file     Family History   Problem Relation Age of Onset     Cancer Mother         stomach cancer     Cardiovascular Mother         MI     Diabetes Mother      Obesity Mother      C.A.D. Maternal Grandfather         bypass     Genitourinary Problems Brother         kidney transplant     Heart Disease Sister         heart murmur     Heart Disease Sister         heart murmur     Cancer Father         bone and blood cancer  6 months later     Other Cancer Father          14     Suicide Maternal Grandmother         Fransisco  "    Cerebrovascular Disease Maternal Grandmother      Coronary Artery Disease Paternal Grandfather      Suicide Brother         Christian     Hypertension Brother      Hyperlipidemia Brother      Obesity Brother      Depression Brother         Suicide     Depression Brother         Suicide     Obesity Sister      Obesity Sister      Obesity Sister      Obesity Brother         ROS: 10 point ROS neg other than the symptoms noted above in the HPI.    Physical Exam  /76 (BP Location: Right arm, Patient Position: Sitting)   Pulse 71   Temp 98  F (36.7  C)   Ht 1.549 m (5' 1\")   Wt 60.3 kg (133 lb)   SpO2 100%   BMI 25.13 kg/m    HEENT:  Normocephalic, atraumatic.  PERRLA.  EOM s intact.  Visual fields full to gross exam  Neck:  Supple, non-tender, without lymphadenopathy.  Heart:  No peripheral edema  Lungs:  No SOB  Abdomen:  Non-distended.   Skin:  Warm and dry.  Extremities:  No edema, cyanosis or clubbing.  Psychiatric:  No apparent distress  Musculoskeletal:  Normal bulk and tone    NEUROLOGICAL EXAMINATION:     Mental status:  Alert and Oriented x 3, speech is fluent.  Cranial nerves:  II-XII intact.   Motor:    Shoulder Abduction:  Right:  5/5   Left:  5/5  Biceps:                      Right:  5/5   Left:  5/5  Triceps:                     Right:  5/5   Left:  5/5  Wrist Extensors:       Right:  5/5   Left:  5/5  Wrist Flexors:           Right:  5/5   Left:  5/5  interosseus :            Right:  5/5   Left:  5/5  Hip Flexion:                Right: 5/5  Left:  5/5  Quadriceps:             Right:  5/5  Left:  5/5  Hamstrings:             Right:  5/5  Left:  5/5  Gastroc Soleus:        Right:  5/5  Left:  5/5  Tib/Ant:                      Right:  5/5  Left:  5/5  EHL:                     Right:  5/5  Left:  5/5  Sensation:  Intact  Reflexes:  Negative Babinski.  Negative Clonus.  Negative Limon's.  Coordination:  Smooth finger to nose testing.   Negative pronator drift.  Smooth tandem walking.    A/P:  70F " w/ right mid-back pain, right neck and shoulder pain    I had a discussion with the patient, reviewing the history, symptoms, and imaging  Discussed that given that the worst of her pain is in the mid-back, that should first be an area of focus prior to interventions focused on the neck  Will refer to Pain Clinic to discuss injection options for the mid-back

## 2020-11-16 NOTE — NURSING NOTE
"Carly Gregg is a 70 year old female who presents for:  Chief Complaint   Patient presents with     Consult     Cervical / thoracic        Initial Vitals:  /76 (BP Location: Right arm, Patient Position: Sitting)   Pulse 71   Temp 98  F (36.7  C)   Ht 5' 1\" (1.549 m)   Wt 133 lb (60.3 kg)   SpO2 100%   BMI 25.13 kg/m   Estimated body mass index is 25.13 kg/m  as calculated from the following:    Height as of this encounter: 5' 1\" (1.549 m).    Weight as of this encounter: 133 lb (60.3 kg).. Body surface area is 1.61 meters squared. BP completed using cuff size: regular  No Pain (0)    Nursing Comments: Patient presents for cervical / thoracic consult    Max Zambrano MA  "

## 2020-11-16 NOTE — PATIENT INSTRUCTIONS
Patient Next Steps:      Order placed for comprehensive pain management care  o You can call [facility] to schedule at [phone].     Please call us if you have any further questions or concerns.    [facility]   Phone: [phone]  Fax: [phone]

## 2020-12-02 ENCOUNTER — VIRTUAL VISIT (OUTPATIENT)
Dept: PALLIATIVE MEDICINE | Facility: CLINIC | Age: 70
End: 2020-12-02
Payer: COMMERCIAL

## 2020-12-02 DIAGNOSIS — M54.6 CHRONIC RIGHT-SIDED THORACIC BACK PAIN: ICD-10-CM

## 2020-12-02 DIAGNOSIS — M25.511 ACUTE PAIN OF RIGHT SHOULDER: Primary | ICD-10-CM

## 2020-12-02 DIAGNOSIS — G89.29 CHRONIC RIGHT-SIDED THORACIC BACK PAIN: ICD-10-CM

## 2020-12-02 DIAGNOSIS — M54.12 CERVICAL RADICULOPATHY: ICD-10-CM

## 2020-12-02 PROCEDURE — 99214 OFFICE O/P EST MOD 30 MIN: CPT | Mod: 95 | Performed by: PAIN MEDICINE

## 2020-12-02 ASSESSMENT — PAIN SCALES - GENERAL: PAINLEVEL: MODERATE PAIN (4)

## 2020-12-02 NOTE — PATIENT INSTRUCTIONS
----------------------------------------------------------------  [facility] Number:  [phone]     Call with any questions about your care and for scheduling assistance.     Calls are returned Monday through Friday between 8 AM and 4:30 PM. We usually get back to you within 2 business days depending on the issue/request.    If we are prescribing your medications:    For opioid medication refills, call the clinic or send a [employer] message 7 days in advance.  Please include:    Name of requested medication    Name of the pharmacy.    For non-opioid medications, call your pharmacy directly to request a refill. Please allow 3-4 days to be processed.     Per [place] State Law:    All controlled substance prescriptions must be filled within 30 days of being written.      For those controlled substances allowing refills, pickup must occur within 30 days of last fill.      We believe regular attendance is key to your success in our program!      Any time you are unable to keep your appointment we ask that you call us at least 24 hours in advance to cancel.This will allow us to offer the appointment time to another patient.     Multiple missed appointments may lead to dismissal from the clinic.

## 2020-12-02 NOTE — PROGRESS NOTES
"Carly Gregg is a 70 year old female who is being evaluated via a billable video visit.      The patient has been notified of following:     \"This video visit will be conducted via a call between you and your physician/provider. We have found that certain health care needs can be provided without the need for an in-person physical exam.  This service lets us provide the care you need with a video conversation.  If a prescription is necessary we can send it directly to your pharmacy.  If lab work is needed we can place an order for that and you can then stop by our lab to have the test done at a later time.    Video visits are billed at different rates depending on your insurance coverage.  Please reach out to your insurance provider with any questions.    If during the course of the call the physician/provider feels a video visit is not appropriate, you will not be charged for this service.\"    Patient has given verbal consent for Video visit? Yes  How would you like to obtain your AVS? MyChart  If you are dropped from the video visit, the video invite should be resent to: Text to cell phone: [phone]  Will anyone else be joining your video visit? No        Jesus Bourne MA        Video-Visit Details    Type of service:  Video Visit    Video Start Time: 9  Video End Time: 945    Originating Location (pt. Location): Home    Distant Location (provider location):  [facility]     Platform used for Video Visit: Derrick Omer MD      [facility] Consultation    Date of visit: 12/2/2020    Reason for consultation:    Primary Care Provider is Pam Salinas.  Pain medications are being prescribed by pcp.    Please see the [facility] health questionnaire which the patient completed and reviewed with me in detail.    Chief Complaint:    Chief Complaint   Patient presents with     Pain     Video visit due to COVID-19      MME " prescribed prior to seeing patient:  Current MME:    Pain history: s/p sona 9/20 with some benefit for APPROX 1 MONTH  Carly Gregg is a 70 year old female who first started having problems with pain approx 1 yr  Right Neck, shoulder,  and mid thoracic  The pt denies any specific inciting event  The pain is sharp stabbing   The PAIN is constant if not on meds  Denies any numbness tingling burning  The pain is worse with lifting her arm ablove chest high  The pain is worse with stirring a pot  Reports some allodynia on occasion, but in general not present. Specifically no issues with cloths  The pain is mostly overjust inferior to her bra line   Denies any rash  Pt may have had shingles in the past in the neck area, but she feels that it resolved. Not sure if there was a temporial relationship. Of note there was some numbness, but that resolved. She did not have classic shingles lesions but were slightly raised. Denies any crusted lesions    Benefit with heating  Benefit with chiro and massage but not lasting as long  The pt also has neck Lower neck pain radiatianing to her upper biceps   The pain is a constant pain    Benefit with massage and chiro    Of note I dont see work up for shoulder in the past    The pt frustrated wants a clear.  Patient is really a clear diagnosis of her pathology.  She did not feel testing with a differential and multiple positives.  Patient here lately a clear diagnosis.  I do think with her that many instances when the pathology is occurring    The pt is having some issues with sleep  Benefit with gabapentin   (Significantly affecting her LE leg overall pain significantly but never anything cardiac  Pain rating: intensity  Averages 7/10 on a 0-10 scale.    THE 4 A's OF OPIOID MAINTENANCE ANALGESIA    Analgesia: y    Activity: y    Adverse effects: n    Adherence to Rx protocol: y    [place] Board of Pharmacy Data Base Reviewed:    YES;     Current treatments  include:    Tramadol prn on rare occasions  marce  Previous medication treatments included:  oxy    Other treatments have included:  Carly Gregg has not been seen at a pain clinic in the past.    Injections:   : s/p sona 9/20 with some benefit   Past Medical History:  Past Medical History:   Diagnosis Date     B12 deficiency      Insomnia      Mood swings      Past Surgical History:  Past Surgical History:   Procedure Laterality Date     BIOPSY  2008     CHOLECYSTECTOMY  1980     COLONOSCOPY  2003?     COLONOSCOPY N/A 8/10/2018    Procedure: COLONOSCOPY;  colonoscopy;  Surgeon: Phillip Robbins MD;  Location: [facility]     ESOPHAGOSCOPY, GASTROSCOPY, DUODENOSCOPY (EGD), COMBINED N/A 6/24/2016    Procedure: COMBINED ESOPHAGOSCOPY, GASTROSCOPY, DUODENOSCOPY (EGD);  Surgeon: Rivas Jade MD;  Location: [facility]     GASTRIC BYPASS  1980     HEAD & NECK SURGERY  5 in the past 12 mos    Dental Implant Surgery / Bone added     TONSILLECTOMY  age 29     TRANSPLANT      Dental Implants ongoing since 2/8/2015     Medications:  Current Outpatient Medications   Medication Sig Dispense Refill     gabapentin (NEURONTIN) 300 MG capsule One capsule in the AM, one capsule at noon, two capsules at bedtime. 120 capsule 3     oxyCODONE (ROXICODONE) 5 MG tablet Take 0.5-1 tablets (2.5-5 mg) by mouth 2 times daily as needed for pain 8 tablet 0     traMADol (ULTRAM) 50 MG tablet Take 1 tablet (50 mg) by mouth every 8 hours as needed for severe pain 50 tablet 0     Folic Acid-Vit B6-Vit B12 2.5-25-1 MG TABS Take 1 tablet by mouth daily (Patient not taking: Reported on 12/2/2020) 90 tablet 3     sertraline (ZOLOFT) 100 MG tablet Take 1 tablet (100 mg) by mouth daily (Patient not taking: Reported on 12/2/2020) 90 tablet 3     Allergies:     Allergies   Allergen Reactions     Morphine Other (See Comments)     Morphine injection given for Hemorid infection.  Patient says she had trouble breathing and her heart raced.      Nsaids      Fainting and hospitalization.     Penicillins Unknown     Novocain [Procaine Hcl] Rash     Can do lidocaine for iv start     Social History:    History of chemical dependency treatment: n    Family history:  Family History   Problem Relation Age of Onset     Cancer Mother         stomach cancer     Cardiovascular Mother         MI     Diabetes Mother      Obesity Mother      C.A.D. Maternal Grandfather         bypass     Genitourinary Problems Brother         kidney transplant     Heart Disease Sister         heart murmur     Heart Disease Sister         heart murmur     Cancer Father         bone and blood cancer  6 months later     Other Cancer Father          14     Suicide Maternal Grandmother         Fransisco     Cerebrovascular Disease Maternal Grandmother      Coronary Artery Disease Paternal Grandfather      Suicide Brother         Christian     Hypertension Brother      Hyperlipidemia Brother      Obesity Brother      Depression Brother         Suicide     Depression Brother         Suicide     Obesity Sister      Obesity Sister      Obesity Sister      Obesity Brother      Family history of headaches: n    Review of Systems:  Skin: negative  Eyes: negative  Ears/Nose/Throat: negative  Respiratory: No shortness of breath, dyspnea on exertion, cough, or hemoptysis  Cardiovascular: negative  Gastrointestinal: negative  Genitourinary: negative  Musculoskeletal: negative  Neurologic: negative  Psychiatric: negative  Hematologic/Lymphatic/Immunologic: negative  Endocrine: negative    Physical Exam:  There were no vitals filed for this visit.  Exam:  Constitutional: healthy, alert and no distress  Respiratory: Speaking in full sentences no accessory muscles use     Skin: no suspicious lesions or rashes  Psychiatric: mentation appears normal and affect normal/bright    Musculoskeletal exam:  Gait/Station/Posture: wnl  Cervical spine: ROMwnl  Negative Spurling       Right shoulder positive  reproduction with internal/external rotation.  Positiveneers major significant to perform  Able to easily overcome gravity with her bilateral majority    Diagnostic tests:       C2-C3: No loss of disc height. No significant disc herniation. Normal  facets. No spinal canal or neural foraminal narrowing.      C3-C4: Grade 1 anterolisthesis with uncovering of the disc. Mild loss  of disc height and signal. Right foraminal disc protrusion with right  uncinate spurring. Marked asymmetric right-sided facet hypertrophy.  Mild spinal canal narrowing. Severe right neural foraminal narrowing.  No significant left neural foraminal narrowing.     C4-C5: Grade 1 anterolisthesis with uncovering of the disc. Moderate  loss of disc height and signal. Posterior disc bulge with right  greater than left uncinate spurring. Marked right-sided facet  hypertrophy. Mild central spinal canal narrowing. Moderate to severe  right neural foraminal narrowing. Moderate left neural foraminal  narrowing.      C5-C6: Grade 1 anterolisthesis with uncovering of the disc. Moderate  loss of disc height and signal. Posterior disc bulge with uncinate  spurring. No significant facet hypertrophy. No significant spinal  canal narrowing. No neural foraminal narrowing.      C6-C7: Marked loss of disc height and signal. Circumferential disc  bulge with endplate osteophytic spurring. Normal facets. No spinal  canal narrowing. Moderate bilateral neural foraminal narrowing.      C7-T1: No loss of disc height. No significant disc herniation. Normal  facets. No spinal canal or neural foraminal narrowing.      Paraspinous soft tissues are unremarkable.                                                                       IMPRESSION:    1. Multilevel degenerative changes throughout the cervical spine with  grade 1 anterolisthesis of C3 on C4, C4 on C5, and C5 on C6.  2. Mild spinal canal narrowing at C3-4 and C4-5.  3. Multiple levels of neural foraminal narrowing,  most pronounced on  the right at C3-4 and C4-5.  4. Asymmetric right-sided facet hypertrophy at C3-4 and C4-5.   FINDINGS:  Sagittal alignment is normal. Normal vertebral body  heights. Mild Modic type I degenerative endplate changes at T9-T10 and  T11-T12. Modic type II predominant degenerative endplate change at  T10-T11. Multilevel chronic-appearing Schmorl's nodes/degenerative  endplate irregularities, particularly at the T9-T10, T10-T11 and  T11-T12 levels. No destructive marrow lesion seen. Normal appearance  of the spinal cord. Moderate degenerative disc height loss at T9-T10  and T10-T11 with milder degrees of disc height loss elsewhere in the  thoracic spine. Mild scattered facet degenerative changes.     There are small multilevel disc bulges. There are no large disc  protrusions. There is no significant mass effect on the spinal cord,  and no significant spinal canal stenosis. No high-grade neural  foraminal stenosis. Mild left T9-T10 neural foraminal narrowing. The  paraspinous soft tissues are unremarkable.                                                                      IMPRESSION:  Multilevel degenerative changes of the thoracic spine, as  described. No significant/high-grade spinal canal or neural foraminal  stenosis.       D.I.R.E Score: Patient Selection for Chronic Opioid Analgesia    For each factor, rate the patient's score from 1 - 3 based on the explanations on the right.       Diagnosis             2         1 = Benign chronic condition with minimal objective findings or no definite medical diagnosis.  Examples:  fibromyalgia, migraine, headaches, non-specific back pain.  2 = Slowly progressive condition concordant with moderate pain, or fixed condition with moderate objective findings.  Examples: failed back surgery syndrome, back pain with moderate degenerative changes, neuropathic pain.  3 = Advanced condition concordant with severe pain with objective findings.  Examples: severe  ischemic vascular disease, advanced neuropathy, severe spinal stenosis.    Intractability             2         1 = Few therapies have been tried and the patient takes a passive role in his/her pain management process.   2 = Most costomary treatments have been tried but the patient is not fully engaged in the pain management process, or barriers prevent (insurance, transportation, medical illness)  3 = Patient fully engaged in a spectrum of appropriate treatments but with inadequate response.    Risk   (Risk = Total of P+C+R+S below)       Psychological             2         1 = Serious personality dysfunction or mental illness interfering with care.  Examples: personality disorder, severe affective disorder, significant personality issues.  2 = Personality or mental health interferes moderately.  Example: depression or anxiety disorder.  3 = Good communication with the clinic.  No significant personality dysfunction or mental illness.       Chemical      Health             2         1 = Active or very recent use of illicit drugs, excessive alcohol, or prescription drug abuse.  2 = Chemical coper (uses medications to cope with stress) or history of chemical dependency in remission.  3 = No CD history.  Not drug-focused or chemically reliant       Reliability             2         1 = History of numerous problems: medication misuse, missed appointments, rarely follows through.  2 = Occasional difficulties with compliance, but generally reliable.  3 = Highly reliable patient with medications, appointments and treatment.       Social      Support             2         1= Life in chaos.  Little family support and few close relationships.  Loss of most normal life roles.  2 = Reduction in some relationships and life roles.  3 = Supportive family/close relationships.  Involved in work or school and no social isolation.    Efficacy score             2         1 = Poor function or minimal pain relief despite moderate to  high doses.  2 = Moderate benefit with function improved in a number of ways (or insufficient info - hasn't tried opioid yet or very low doses or too short a trial.  3 = Good improvement in pain and function and quality of life with stable doses over time.                                    14    Total score = D + I + R + E    Score 7-13: Not a suitable candidate for long-term opioid analgesia  Score 14-21: May be a good candidate      Assessment/Plan:  Carly Gregg is a 70 year old female who presents with the complaints of neck, shoulder, mid thoracic pain.   Carly was seen today for pain.    Diagnoses and all orders for this visit:    Chronic right-sided thoracic back pain  -     PAIN MANAGEMENT REFERRAL    Cervical radiculopathy  -     PAIN MANAGEMENT REFERRAL         - Further procedures recommended:    - Consider TPI   - Consider thoracic epidural   - Medication Management:    - Increase gabapentin to 600 TID   - reasonable to continue prn tramadol  - Physical Therapy: would strongly consider for shoulder  - Clinical Health Psychologist to address issues of relaxation, behavioral change, coping style, and other factors important to improvement: consider   - Diagnostic Studies: shoulder xray to r/u shoulder pathology    - Urine toxicology screen today: no   - Follow up:    - will call with results of xray to discuss plan going forward          Total time spent was 45 minutes    Justin Omer MD  [facility]  This note was created with voice recognition software, and while reviewed for accuracy, typos may remain.

## 2020-12-07 ENCOUNTER — TELEPHONE (OUTPATIENT)
Dept: PALLIATIVE MEDICINE | Facility: CLINIC | Age: 70
End: 2020-12-07

## 2020-12-07 DIAGNOSIS — G89.29 CHRONIC RIGHT-SIDED THORACIC BACK PAIN: ICD-10-CM

## 2020-12-07 DIAGNOSIS — M54.6 CHRONIC RIGHT-SIDED THORACIC BACK PAIN: ICD-10-CM

## 2020-12-07 DIAGNOSIS — M54.12 CERVICAL RADICULOPATHY: ICD-10-CM

## 2020-12-07 DIAGNOSIS — M79.2 THORACIC NEURALGIA: Primary | ICD-10-CM

## 2020-12-07 NOTE — TELEPHONE ENCOUNTER
Reason for call:  Other   Patient called regarding (reason for call): call back  Additional comments: Pt calling to state the gabapentin (NEURONTIN) 300 MG capsule increase has not helped with her pain and has only made her more tired.    Phone number to reach patient:  Work number on file:  [phone] (work)    Best Time:  anytime    Can we leave a detailed message on this number?  YES    Travel screening: Not Applicable     Lydia VEGA    [facility]

## 2020-12-08 NOTE — TELEPHONE ENCOUNTER
Writer attempted to call pt, No answer.  LVM for Pt to call writer back at [phone].    Fransisco Rock, RN  Care Coordinator   [facility] [place]

## 2020-12-08 NOTE — TELEPHONE ENCOUNTER
Pt returning call to nursing, please call her at work at [phone].      Regine AN    [facility] [place]

## 2020-12-09 ENCOUNTER — HOSPITAL ENCOUNTER (OUTPATIENT)
Dept: GENERAL RADIOLOGY | Facility: CLINIC | Age: 70
Discharge: HOME | End: 2020-12-09
Attending: PAIN MEDICINE | Admitting: PAIN MEDICINE
Payer: COMMERCIAL

## 2020-12-09 DIAGNOSIS — M25.511 ACUTE PAIN OF RIGHT SHOULDER: ICD-10-CM

## 2020-12-09 PROCEDURE — 73030 X-RAY EXAM OF SHOULDER: CPT | Mod: RT

## 2020-12-09 RX ORDER — GABAPENTIN 600 MG/1
600 TABLET ORAL 3 TIMES DAILY
Qty: 270 TABLET | Refills: 0 | Status: SHIPPED | OUTPATIENT
Start: 2020-12-09 | End: 2021-03-03

## 2020-12-09 NOTE — TELEPHONE ENCOUNTER
Writer called Pt.  Pt will continue to get tramadol through PCP.      Pt notified that Rx was ordered as as as Thoracic ROZ.    Fransisco Rock, RN  Care Coordinator   [facility] [place]

## 2020-12-09 NOTE — TELEPHONE ENCOUNTER
Call back to Pt.    Gabapentin 600mg TID current dose, started new dose on 12/2/20, Pt will be out in the morning, Pt would like a 3 month supply of the 600mg tablets, This does make Pt have some tiredness during the day.    Pt asking if Dr. Omer would be able to refill Pt's tramadol also?  Writer discussed that she would have to get that refilled by PCP until Dr. Omer decided to take it over and CSA is completed with clinic if Dr. Omer agrees to take over prescribing.    Pt restarted taking Tramadol ordered by PCP.      Discussed Thoracic ROZ.  Pt is interested in this if it will help.  Please sign order if appropriate    Pt complete Xray on shoulder today for Dr. Omer to review.    Fransisco Rock, RN  Care Coordinator   [facility] [place]

## 2020-12-15 ENCOUNTER — RADIOLOGY INJECTION OFFICE VISIT (OUTPATIENT)
Dept: PALLIATIVE MEDICINE | Facility: CLINIC | Age: 70
End: 2020-12-15
Attending: PAIN MEDICINE
Payer: COMMERCIAL

## 2020-12-15 ENCOUNTER — ANCILLARY PROCEDURE (OUTPATIENT)
Dept: RADIOLOGY | Facility: CLINIC | Age: 70
End: 2020-12-15
Attending: PAIN MEDICINE
Payer: COMMERCIAL

## 2020-12-15 VITALS
DIASTOLIC BLOOD PRESSURE: 78 MMHG | SYSTOLIC BLOOD PRESSURE: 135 MMHG | OXYGEN SATURATION: 100 % | RESPIRATION RATE: 16 BRPM | HEART RATE: 78 BPM

## 2020-12-15 DIAGNOSIS — M79.2 THORACIC NEURALGIA: Primary | ICD-10-CM

## 2020-12-15 DIAGNOSIS — M54.12 CERVICAL RADICULOPATHY: ICD-10-CM

## 2020-12-15 PROCEDURE — 62321 NJX INTERLAMINAR CRV/THRC: CPT | Performed by: PAIN MEDICINE

## 2020-12-15 ASSESSMENT — PAIN SCALES - GENERAL: PAINLEVEL: MODERATE PAIN (5)

## 2020-12-15 NOTE — NURSING NOTE
Pre-procedure Intake    Have you been fasting? NA    If yes, for how long? No     Are you taking a prescribed blood thinner such as coumadin, Plavix, Xarelto?    No    If yes, when did you take your last dose? No     Do you take aspirin?  No    If cervical procedure, have you held aspirin for 6 days?   No     Do you have any allergies to contrast dye, iodine, steroid and/or numbing medications?  Yes, Iodine     Are you currently taking antibiotics or have an active infection?  NO    Have you had a fever/elevated temperature within the past week? NO    Are you currently taking oral steroids? NO    Do you have a ? Yes       Are you pregnant or breastfeeding?  NO    Are the vital signs normal?  Yes    Jesus Bourne MA

## 2020-12-15 NOTE — PATIENT INSTRUCTIONS
[facility]   Procedure Discharge Instructions    Today you saw:    Dr. Justin Omer      You had an:  Thoracic Epidural steroid injection    Medications used:  Lidocaine   Bupivacaine   Dexamethasone Omnipaque        If you were holding your blood thinning medication, please restart taking it: N/A    Be cautious when walking. Numbness and/or weakness in the lower extremities may occur for up to 6-8 hours after the procedure due to effect of the local anesthetic    Do not drive for 6 hours. The effect of the local anesthetic could slow your reflexes.     You may resume your regular activities after 24 hours    Avoid strenuous activity for the first 24 hours    You may shower, however avoid swimming, tub baths or hot tubs for 24 hours following your procedure    You may have a mild to moderate increase in pain for several days following the injection.    It may take up to 14 days for the steroid medication to start working although you may feel the effect as early as a few days after the procedure.       You may use ice packs for 10-15 minutes, 3 to 4 times a day at the injection site for comfort    Do not use heat to painful areas for 6 to 8 hours. This will give the local anesthetic time to wear off and prevent you from accidentally burning your skin.     Unless you have been directed to avoid the use of anti-inflammatory medications (NSAIDS), you may use medications such as ibuprofen, Aleve or Tylenol for pain control if needed.     Possible side effects of steroids that you may experience include flushing, elevated blood pressure, increased appetite, mild headaches and restlessness.  All of these symptoms will get better with time.    If you experience any of the following, call the Pain Clinic during work hours (Mon-Friday 8-4:30 pm) at [phone] or the Provider Line after hours at [phone]:  -Fever over 100 degree F  -Swelling, bleeding, redness, drainage, warmth at the  injection site  -Progressive weakness or numbness   -Unusual new onset of pain that is not improving

## 2020-12-15 NOTE — NURSING NOTE
Discharge Information    IV Discontiued Time:  NA    Amount of Fluid Infused:  NA    Discharge Criteria = When patient returns to baseline or as per MD order    Consciousness:  Pt is fully awake    Circulation:  BP +/- 20% of pre-procedure level    Respiration:  Patient is able to breathe deeply    O2 Sat:  Patient is able to maintain O2 Sat >92% on room air    Activity:  Moves 4 extremities on command    Ambulation:  Patient is able to stand and walk or stand and pivot into wheelchair    Dressing:  Clean/dry or No Dressing    Notes:   Discharge instructions and AVS given to patient    Patient meets criteria for discharge?  YES    Admitted to PCU?  No    Responsible adult present to accompany patient home?  Yes    Signature/Title:    Fransisco Rock RN  RN Care Coordinator  [facility] [place]

## 2020-12-15 NOTE — PROGRESS NOTES
Pre procedure Diagnosis: thoracic neuralgia   Post procedure Diagnosis: Same  Procedure performed: T10-11 interlaminar epidural steroid injection   Anesthesia: none  Complications: none  Operators: Justin Omer MD     Indications:   Carly Gregg is a 70 year old female.  The patient has a history of right thoracic pain radiating to her right.  Examination shows neg allodynia.  she has tried conservative treatment including meds/pt.    MRI    FINDINGS:  Sagittal alignment is normal. Normal vertebral body  heights. Mild Modic type I degenerative endplate changes at T9-T10 and  T11-T12. Modic type II predominant degenerative endplate change at  T10-T11. Multilevel chronic-appearing Schmorl's nodes/degenerative  endplate irregularities, particularly at the T9-T10, T10-T11 and  T11-T12 levels. No destructive marrow lesion seen. Normal appearance  of the spinal cord. Moderate degenerative disc height loss at T9-T10  and T10-T11 with milder degrees of disc height loss elsewhere in the  thoracic spine. Mild scattered facet degenerative changes.     There are small multilevel disc bulges. There are no large disc  protrusions. There is no significant mass effect on the spinal cord,  and no significant spinal canal stenosis. No high-grade neural  foraminal stenosis. Mild left T9-T10 neural foraminal narrowing. The  paraspinous soft tissues are unremarkable.                                                                      IMPRESSION:  Multilevel degenerative changes of the thoracic spine, as  described. No significant/high-grade spinal canal or neural foraminal  stenosis.   Options/alternatives, benefits and risks were discussed with the patient including but not limited to bleeding, infection, no pain relief, tissue trauma, exposure to radiation, reaction to medications, spinal cord injury, dural puncture, weakness, numbness and headache.  Questions were answered to her satisfaction and she wishes to proceed.  Voluntary informed consent was obtained and signed.     Vitals were reviewed: Yes  Allergies were reviewed:  Yes   Medications were reviewed:  Yes   Pre-procedure pain score: 5/10    Procedure:  The patient's medical history, medications, and allergies were reviewed and reconciled.  After obtaining signed informed consent, the patient was brought into the procedure suite and was placed in a prone position on the procedure table.   A Pause for the Cause was performed.  Patient was prepped and draped in the usual sterile fashion.     The t10-11 interspace was identified with AP fluoroscopy.  A total of 4ml of 1% lidocaine was used to anesthetize the skin and subcutaneous tissues for a right midline approach.    A 20gauge 3.5inch Touhy needle was advanced utilizing intermittent AP and Lateral fluoroscopy and air for loss of resistance.  The epidural space was encountered on the first pass without difficulties.  Aspiration for blood and CSF was negative.  Needle position was verified by injecting 1 ml of Omnipaque utilizing real-time fluoroscopy that showed good needle placement and epidural spread without signs of intravascular or intrathecal uptake.  Omnipaque wasted:  9 ml.    Then, after repeated negative aspiration for blood or CSF, a combination of Kenalog 40 mg, Bupivicaine 0.25% 2 ml, diluted with 3 ml of normal saline to a total injectate volume of 6 ml was injected into the epidural space in a slow and incremental fashion and the needle was restyletted and withdrawn.  All injected medications were preservative free.    The injection site was cleaned and a sterile dressing was applied.    The patient tolerated the procedure well without complications and was taken to the recovery room for continued observation.    Images were saved to PACS.    Post-procedure pain score: 0/10  Follow-up includes:   -f/u phone call in one week  -f/u with referring provider     Justin Omer MD  [facility]

## 2021-01-05 ENCOUNTER — MYC MEDICAL ADVICE (OUTPATIENT)
Dept: PALLIATIVE MEDICINE | Facility: CLINIC | Age: 71
End: 2021-01-05

## 2021-01-06 NOTE — TELEPHONE ENCOUNTER
meaghan Hodge message:  From: Carly JUVE Marysol      Created: 1/5/2021 6:29 PM      I have developed brown splotches on the right side of my back where the pain is.  I am still taking the Gabupentin 600 mg/3 times a day.  After working all day, I also need to come home and use my heating pad to relieve the pain on the right side of my back and my right shoulder.  I have also tried Heat Therapy patches (Only 2, 1 week apart) that contain Lidocaine.  As you recall, I was in to see you for the shot in my back the first week of December.     Could any of these things be causing these splotches.  My back is absolutely smooth, no rash, no redness, just these splotches and there is no soreness or itching.  Can you tell me what may be causing them and should I be concerned.  Thank you,  Carly Marysol      ____________________    12/15/2020: T10-11 interlaminar epidural steroid injection.    Reginot sent to pt:  From: Britney Mata RN      Created: 1/6/2021 2:05 PM        Chang Carroll,  Are you sleeping w/ your heating pad on you or use heat a lot?  Are you using ice to the area often?  Have you started any new medications?     MARGI Tan-BSN  [facility]

## 2021-01-07 NOTE — TELEPHONE ENCOUNTER
"Mychart sent to pt:  From: Britney Mata RN      Created: 1/7/2021 3:59 PM      Chang Carroll.  Here is Dr. Omer's response:  We do commonly see this with use of heating pads.  May be worth stopping the heating pad over the area for 1 to 2 weeks and see if symptoms improve.  Highly unlikely that this is related in any way to the procedure itself.  If does not resolve with time would recommend following up with primary care provider.\"     Do you have pain w/ just touching the area?  Do you have a history of shingles?     MARGI Tan-BSN  [facility]          "

## 2021-01-07 NOTE — TELEPHONE ENCOUNTER
We do commonly see this with use of heating pads.  May be worth stopping the heating pad over the area for 1 to 2 weeks and see if symptoms improve.  Highly unlikely that this is related in any way to the procedure itself.  If does not resolve with time would recommend following up with PCP.      Also does the patient have any allodynia over the area.  May be worth trying to clarify whether the spots are in a dermatomal pattern.    [place] addressing whether patient has history of shingles.

## 2021-01-07 NOTE — TELEPHONE ENCOUNTER
[facility]  From: Carly Gregg      Created: 1/6/2021 6:43 PM      I do use my heating pad practically every day because after I m up and moving around doing any daily activity, I am very sore and heat is the only thing that works. I do not sleep with it but I do wake up around 3:30 am every morning and wish I could take a pain pill, but I have to wait until 8:30 am to take one. I don t use ice at all. In between taking the pain pills, I tried the heat patches with lidocaine and they seems to work for a few hours, but I have only tried them on my shoulder not the middle of my back where it is also painful.   I haven t seen much relief from that last shot.        12/15/2020: T10-11 interlaminar epidural steroid injection.    Britney RN-BSN  [facility]

## 2021-01-11 NOTE — TELEPHONE ENCOUNTER
Per patient myChart message:  From: Carly Gregg      Created: 1/7/2021 7:45 PM      No, there is no pain when I touch my back and I have never had shingles. I have had the shingles shots.  I will quit using my heating pad even though that s the only thing that gives me relief.        Routed to provider.    MARGI Tan-BSN  [facility]

## 2021-03-01 DIAGNOSIS — M79.2 THORACIC NEURALGIA: ICD-10-CM

## 2021-03-01 NOTE — TELEPHONE ENCOUNTER
Patient called and stated she would like a increase for gabapentin (NEURONTIN) 600 MG tablet            Please call patient at work: [phone]          Itzel Escalante    [facility]

## 2021-03-03 NOTE — TELEPHONE ENCOUNTER
Pt stated that she is currently on gabapentin 600mg TID.  Pt would like to increase dose, no side effects.    Pt stated that the gabapentin is helpful, she notices that she is waiting for the next dose though as it wears off too soon.      Pt is wondering if she can take them more frequently than three times per day also.       Pt would like a 3 month supply.      Will forward to Dr. Omer to review and order.      Writer pended order for 800mg tablets three times per day     Fransisco Rock, RN  Care Coordinator   [facility]

## 2021-03-04 RX ORDER — GABAPENTIN 800 MG/1
800 TABLET ORAL 3 TIMES DAILY
Qty: 270 TABLET | Refills: 1 | Status: SHIPPED | OUTPATIENT
Start: 2021-03-04 | End: 2021-08-23

## 2021-03-20 ENCOUNTER — IMMUNIZATION (OUTPATIENT)
Dept: FAMILY MEDICINE | Facility: CLINIC | Age: 71
End: 2021-03-20
Payer: COMMERCIAL

## 2021-03-20 PROCEDURE — 0011A PR COVID VAC MODERNA 100 MCG/0.5 ML IM: CPT

## 2021-03-20 PROCEDURE — 91301 PR COVID VAC MODERNA 100 MCG/0.5 ML IM: CPT

## 2021-04-17 ENCOUNTER — IMMUNIZATION (OUTPATIENT)
Dept: FAMILY MEDICINE | Facility: CLINIC | Age: 71
End: 2021-04-17
Attending: FAMILY MEDICINE
Payer: COMMERCIAL

## 2021-04-17 PROCEDURE — 91301 PR COVID VAC MODERNA 100 MCG/0.5 ML IM: CPT

## 2021-04-17 PROCEDURE — 0012A PR COVID VAC MODERNA 100 MCG/0.5 ML IM: CPT

## 2021-05-28 ENCOUNTER — OFFICE VISIT (OUTPATIENT)
Dept: FAMILY MEDICINE | Facility: CLINIC | Age: 71
End: 2021-05-28
Payer: COMMERCIAL

## 2021-05-28 VITALS
BODY MASS INDEX: 26 KG/M2 | OXYGEN SATURATION: 100 % | SYSTOLIC BLOOD PRESSURE: 130 MMHG | HEIGHT: 60 IN | WEIGHT: 132.4 LBS | RESPIRATION RATE: 17 BRPM | DIASTOLIC BLOOD PRESSURE: 80 MMHG | HEART RATE: 80 BPM | TEMPERATURE: 98 F

## 2021-05-28 DIAGNOSIS — D64.9 ANEMIA, UNSPECIFIED TYPE: ICD-10-CM

## 2021-05-28 DIAGNOSIS — R10.11 RIGHT UPPER QUADRANT PAIN: Primary | ICD-10-CM

## 2021-05-28 DIAGNOSIS — R39.198 SLOWING OF URINARY STREAM: ICD-10-CM

## 2021-05-28 DIAGNOSIS — R39.11 URINARY HESITANCY: ICD-10-CM

## 2021-05-28 LAB
ALBUMIN SERPL-MCNC: 3.2 G/DL (ref 3.4–5)
ALBUMIN UR-MCNC: NEGATIVE MG/DL
ALP SERPL-CCNC: 112 U/L (ref 40–150)
ALT SERPL W P-5'-P-CCNC: 33 U/L (ref 0–50)
ANION GAP SERPL CALCULATED.3IONS-SCNC: 5 MMOL/L (ref 3–14)
APPEARANCE UR: CLEAR
AST SERPL W P-5'-P-CCNC: 29 U/L (ref 0–45)
BACTERIA #/AREA URNS HPF: ABNORMAL /HPF
BASOPHILS # BLD AUTO: 0 10E9/L (ref 0–0.2)
BASOPHILS NFR BLD AUTO: 0.4 %
BILIRUB SERPL-MCNC: 0.4 MG/DL (ref 0.2–1.3)
BILIRUB UR QL STRIP: NEGATIVE
BUN SERPL-MCNC: 9 MG/DL (ref 7–30)
CALCIUM SERPL-MCNC: 9.2 MG/DL (ref 8.5–10.1)
CHLORIDE SERPL-SCNC: 108 MMOL/L (ref 94–109)
CO2 SERPL-SCNC: 24 MMOL/L (ref 20–32)
COLOR UR AUTO: YELLOW
CREAT SERPL-MCNC: 0.74 MG/DL (ref 0.52–1.04)
DIFFERENTIAL METHOD BLD: ABNORMAL
EOSINOPHIL # BLD AUTO: 0.3 10E9/L (ref 0–0.7)
EOSINOPHIL NFR BLD AUTO: 6.1 %
ERYTHROCYTE [DISTWIDTH] IN BLOOD BY AUTOMATED COUNT: 20.1 % (ref 10–15)
GFR SERPL CREATININE-BSD FRML MDRD: 83 ML/MIN/{1.73_M2}
GLUCOSE SERPL-MCNC: 81 MG/DL (ref 70–99)
GLUCOSE UR STRIP-MCNC: NEGATIVE MG/DL
HCT VFR BLD AUTO: 26.3 % (ref 35–47)
HGB BLD-MCNC: 7.7 G/DL (ref 11.7–15.7)
HGB UR QL STRIP: NEGATIVE
KETONES UR STRIP-MCNC: NEGATIVE MG/DL
LEUKOCYTE ESTERASE UR QL STRIP: ABNORMAL
LIPASE SERPL-CCNC: 146 U/L (ref 73–393)
LYMPHOCYTES # BLD AUTO: 1.7 10E9/L (ref 0.8–5.3)
LYMPHOCYTES NFR BLD AUTO: 34.6 %
MCH RBC QN AUTO: 22.3 PG (ref 26.5–33)
MCHC RBC AUTO-ENTMCNC: 29.3 G/DL (ref 31.5–36.5)
MCV RBC AUTO: 76 FL (ref 78–100)
MONOCYTES # BLD AUTO: 0.7 10E9/L (ref 0–1.3)
MONOCYTES NFR BLD AUTO: 13.8 %
NEUTROPHILS # BLD AUTO: 2.2 10E9/L (ref 1.6–8.3)
NEUTROPHILS NFR BLD AUTO: 45.1 %
NITRATE UR QL: NEGATIVE
NON-SQ EPI CELLS #/AREA URNS LPF: ABNORMAL /LPF
PH UR STRIP: 5.5 PH (ref 5–7)
PLATELET # BLD AUTO: 458 10E9/L (ref 150–450)
POTASSIUM SERPL-SCNC: 3.9 MMOL/L (ref 3.4–5.3)
PROT SERPL-MCNC: 7 G/DL (ref 6.8–8.8)
RBC # BLD AUTO: 3.46 10E12/L (ref 3.8–5.2)
RBC #/AREA URNS AUTO: ABNORMAL /HPF
SODIUM SERPL-SCNC: 137 MMOL/L (ref 133–144)
SOURCE: ABNORMAL
SP GR UR STRIP: <=1.005 (ref 1–1.03)
UROBILINOGEN UR STRIP-ACNC: 0.2 EU/DL (ref 0.2–1)
WBC # BLD AUTO: 4.9 10E9/L (ref 4–11)
WBC #/AREA URNS AUTO: ABNORMAL /HPF

## 2021-05-28 PROCEDURE — 36415 COLL VENOUS BLD VENIPUNCTURE: CPT | Performed by: NURSE PRACTITIONER

## 2021-05-28 PROCEDURE — 99214 OFFICE O/P EST MOD 30 MIN: CPT | Performed by: NURSE PRACTITIONER

## 2021-05-28 PROCEDURE — 85025 COMPLETE CBC W/AUTO DIFF WBC: CPT | Performed by: NURSE PRACTITIONER

## 2021-05-28 PROCEDURE — 81001 URINALYSIS AUTO W/SCOPE: CPT | Performed by: NURSE PRACTITIONER

## 2021-05-28 PROCEDURE — 80053 COMPREHEN METABOLIC PANEL: CPT | Performed by: NURSE PRACTITIONER

## 2021-05-28 PROCEDURE — 83690 ASSAY OF LIPASE: CPT | Performed by: NURSE PRACTITIONER

## 2021-05-28 ASSESSMENT — MIFFLIN-ST. JEOR: SCORE: 1039.12

## 2021-05-28 NOTE — PROGRESS NOTES
Assessment & Plan     Right upper quadrant pain  Labs ordered for evaluation of liver, pancreas, electrolytes and infection.  I will call patient with results.  Patient is on pain medications which may affect her perception of the pain.  If labs are negative would recommend CT of the abdomen if symptoms are not improving over the weekend.  I will check in on patient on Tuesday when we are back to clinic and evaluate if CT is needed at that time.  CBC came back and shows 7.7 Hgb.  Patient has hx of anemia after gastric bypass which they felt was from ibuprofen use and was on iron but was taken off of this with the ulcer.  Recommend restarting iron supplement and follow-up next week on Friday with CBC recheck.  - CBC with platelets and differential  - Comprehensive metabolic panel (BMP + Alb, Alk Phos, ALT, AST, Total. Bili, TP)  - Lipase    Urinary hesitancy  UA is negative for infection.  Discussed urology referral and patient declined at this time.  Patient will discuss this with her PCP if symptoms worsen.    Slowing of urinary stream  - *UA reflex to Microscopic and Culture ([place] and [facility] (except [place] and [place])  - Urine Microscopic    See Patient Instructions    Return in about 1 week (around 6/4/2021), or if symptoms worsen or fail to improve.    Wendi Murillo NP  [facility] is a 70 year old who presents for the following health issues;     HPI     Abdominal/Flank Pain  Onset/Duration: 3 days   Description:   Character: constant pain   Location: right upper quadrant  Radiation: None and Back  Intensity: moderate  Progression of Symptoms:  same and constant  Accompanying Signs & Symptoms:  Fever/Chills: no  Gas/Bloating: YES- she originally thought it was gas but tums did not help, also took PHazyme and it did not help which it usually does  Nausea: no  Vomitting: no  Diarrhea: no  Constipation: no  Dysuria or Hematuria: no  History:  "  Trauma: no  Previous similar pain: no  Previous tests done: none  Precipitating factors:   Does the pain change with:     Food: no    Bowel Movement: no    Urination: YES- states that when she urinates it is a slow trickle    Other factors:  none  Therapies tried and outcome: tums, gas pill, 800 mg of gabapentin for back pain that is directly behind where her stomach hurts   No LMP recorded. Patient is postmenopausal.     Hx of cholecystectomy in the past.  Hx of gastric bypass at that time as well.  No other surgeries in the abdomen.  Pain in the last year in the right mid back/flank area. She had degenerative changes on MRI of thoracic back with no stenosis. No hx of kidney stones.  No regular alcohol use. Has acid reflux quite a bit and has been using omeprazole every morning.  This has reduced symptoms along with adjustable bed which is helpful.  Lately, she has had increase use of Tums but no improvement.  She took Phazyme for gas or bloating and this usually works but has not been helpful.  No dysphagia symptoms.  No issues with diet and stays away from foods that are spicy or acidic.  Bowels are normal.  Urinary symptoms are like she feels that she has to go but has to move around to get it all out.  She doesn't feel like she is emptying all the way.  This seemed to start the last couple months but to her does not seem to be a problem at this time      Review of Systems   CONSTITUTIONAL: NEGATIVE for fever, chills, change in weight  RESP: NEGATIVE for significant cough or SOB  CV: NEGATIVE for chest pain, palpitations or peripheral edema  GI: NEGATIVE for nausea, or change in bowel habits, POSITIVE for right upper quadrant pain  : normal menstrual cycles and hesitancy  PSYCHIATRIC: NEGATIVE for changes in mood or affect  ROS otherwise negative      Objective    /80   Pulse 80   Temp 98  F (36.7  C) (Tympanic)   Resp 17   Ht 1.511 m (4' 11.5\")   Wt 60.1 kg (132 lb 6.4 oz)   SpO2 100%   BMI " 26.29 kg/m    Body mass index is 26.29 kg/m .  Physical Exam   GENERAL: healthy, alert and no distress  ABDOMEN: tenderness RUQ, no organomegaly or masses and bowel sounds normal  MS: no gross musculoskeletal defects noted, no edema  PSYCH: mentation appears normal, affect normal/bright

## 2021-05-28 NOTE — PATIENT INSTRUCTIONS
1.  Labs today.  2.  I will contact you with results when they are back.  3.  If they are negative and symptoms are not improving over the weekend, I will order CT for evaluation and you can call [phone] to schedule this procedure.  4.  If labs are positive, I will recommend follow-up with primary care for evaluation and treatment from there.

## 2021-05-28 NOTE — LETTER
June 1, 2021      Carly Gregg  [address] [address] [address]        Dear ,    We are writing to inform you of your test results.          Your electrolytes, pancreas, liver and kidney function are normal.     Resulted Orders   *UA reflex to Microscopic and Culture ([place] and [facility] (except [place] and [place])   Result Value Ref Range    Color Urine Yellow     Appearance Urine Clear     Glucose Urine Negative NEG^Negative mg/dL    Bilirubin Urine Negative NEG^Negative    Ketones Urine Negative NEG^Negative mg/dL    Specific Gravity Urine <=1.005 1.003 - 1.035    Blood Urine Negative NEG^Negative    pH Urine 5.5 5.0 - 7.0 pH    Protein Albumin Urine Negative NEG^Negative mg/dL    Urobilinogen Urine 0.2 0.2 - 1.0 EU/dL    Nitrite Urine Negative NEG^Negative    Leukocyte Esterase Urine Trace (A) NEG^Negative    Source Midstream Urine    CBC with platelets and differential   Result Value Ref Range    WBC 4.9 4.0 - 11.0 10e9/L    RBC Count 3.46 (L) 3.8 - 5.2 10e12/L    Hemoglobin 7.7 (LL) 11.7 - 15.7 g/dL      Comment:      Critical Value called to and read back by  MONY BURKETT NP AT 1616 ON 5/28/21 BY EDILSON      Hematocrit 26.3 (L) 35.0 - 47.0 %    MCV 76 (L) 78 - 100 fl    MCH 22.3 (L) 26.5 - 33.0 pg    MCHC 29.3 (L) 31.5 - 36.5 g/dL    RDW 20.1 (H) 10.0 - 15.0 %    Platelet Count 458 (H) 150 - 450 10e9/L    % Neutrophils 45.1 %    % Lymphocytes 34.6 %    % Monocytes 13.8 %    % Eosinophils 6.1 %    % Basophils 0.4 %    Absolute Neutrophil 2.2 1.6 - 8.3 10e9/L    Absolute Lymphocytes 1.7 0.8 - 5.3 10e9/L    Absolute Monocytes 0.7 0.0 - 1.3 10e9/L    Absolute Eosinophils 0.3 0.0 - 0.7 10e9/L    Absolute Basophils 0.0 0.0 - 0.2 10e9/L    Diff Method Automated Method    Comprehensive metabolic panel (BMP + Alb, Alk Phos, ALT, AST, Total. Bili, TP)   Result Value Ref Range    Sodium 137 133 - 144 mmol/L    Potassium 3.9 3.4 - 5.3 mmol/L    Chloride 108 94 - 109  mmol/L    Carbon Dioxide 24 20 - 32 mmol/L    Anion Gap 5 3 - 14 mmol/L    Glucose 81 70 - 99 mg/dL    Urea Nitrogen 9 7 - 30 mg/dL    Creatinine 0.74 0.52 - 1.04 mg/dL    GFR Estimate 83 >60 mL/min/[1.73_m2]      Comment:      Non  GFR Calc  Starting 12/18/2018, serum creatinine based estimated GFR (eGFR) will be   calculated using the Chronic Kidney Disease Epidemiology Collaboration   (CKD-EPI) equation.      GFR Estimate If Black >90 >60 mL/min/[1.73_m2]      Comment:       GFR Calc  Starting 12/18/2018, serum creatinine based estimated GFR (eGFR) will be   calculated using the Chronic Kidney Disease Epidemiology Collaboration   (CKD-EPI) equation.      Calcium 9.2 8.5 - 10.1 mg/dL    Bilirubin Total 0.4 0.2 - 1.3 mg/dL    Albumin 3.2 (L) 3.4 - 5.0 g/dL    Protein Total 7.0 6.8 - 8.8 g/dL    Alkaline Phosphatase 112 40 - 150 U/L    ALT 33 0 - 50 U/L    AST 29 0 - 45 U/L   Lipase   Result Value Ref Range    Lipase 146 73 - 393 U/L   Urine Microscopic   Result Value Ref Range    WBC Urine 0 - 5 OTO5^0 - 5 /HPF    RBC Urine O - 2 OTO2^O - 2 /HPF    Squamous Epithelial /LPF Urine Few FEW^Few /LPF    Bacteria Urine Few (A) NEG^Negative /HPF       If you have any questions or concerns, please call the clinic at the number listed above.       Sincerely,      Wendi Murillo NP

## 2021-08-03 ENCOUNTER — DOCUMENTATION ONLY (OUTPATIENT)
Dept: FAMILY MEDICINE | Facility: CLINIC | Age: 71
End: 2021-08-03

## 2021-08-03 ENCOUNTER — NURSE TRIAGE (OUTPATIENT)
Dept: NURSING | Facility: CLINIC | Age: 71
End: 2021-08-03

## 2021-08-04 NOTE — PROGRESS NOTES
Received a page from RN about patient , I called around 10.30 pm but phone was giving a busy signal.     Call taken by Dr Doty who is back up for calls.

## 2021-08-04 NOTE — TELEPHONE ENCOUNTER
Pt called stating she has back pain that she has been taking gabapentin 800 mg 3 times a day and it is not working any more. Pt reporting her pain has been severe for the past few days and she is working. Pt wants advice which of the below two pain medication to take for her pain regularly until she is seen at the pain clinic?      Pt states she has oxycodone 5 mg,  to take 2.5 to 5 mg twice daily, and tramadol 50 mg every 8 hours as needed for pain.     Pt reported she took oxycodone 5 mg at 2:30 pm.    RN paged on call provider Dr Wyatt received a retun call from Dr purcell and he advised pt to take tramadol 50 mg every 8 hours  as needed for pain as prescribed  On the pt's chart and to call clinic in the morning. Provide advised pt not to take oxycodone.     RN called pt back and relayed provider advice, and pt stated okay. Pt also said both tramadol and oxycodone was prescribed at the same provider.    Jaswinder Bourne RN  [facility]     Reason for Disposition    [1] Caller has URGENT medication question about med that PCP or specialist prescribed AND [2] triager unable to answer question    Protocols used: MEDICATION QUESTION CALL-A-

## 2021-08-11 ENCOUNTER — OFFICE VISIT (OUTPATIENT)
Dept: FAMILY MEDICINE | Facility: CLINIC | Age: 71
End: 2021-08-11
Payer: COMMERCIAL

## 2021-08-11 VITALS
SYSTOLIC BLOOD PRESSURE: 128 MMHG | HEART RATE: 67 BPM | WEIGHT: 132.5 LBS | HEIGHT: 60 IN | OXYGEN SATURATION: 100 % | DIASTOLIC BLOOD PRESSURE: 70 MMHG | BODY MASS INDEX: 26.01 KG/M2 | TEMPERATURE: 97.4 F

## 2021-08-11 DIAGNOSIS — G89.29 CHRONIC RIGHT-SIDED THORACIC BACK PAIN: Primary | ICD-10-CM

## 2021-08-11 DIAGNOSIS — M54.6 CHRONIC RIGHT-SIDED THORACIC BACK PAIN: Primary | ICD-10-CM

## 2021-08-11 DIAGNOSIS — M54.12 CERVICAL RADICULOPATHY: ICD-10-CM

## 2021-08-11 DIAGNOSIS — D50.9 IRON DEFICIENCY ANEMIA, UNSPECIFIED IRON DEFICIENCY ANEMIA TYPE: ICD-10-CM

## 2021-08-11 LAB
BASOPHILS # BLD AUTO: 0 10E3/UL (ref 0–0.2)
BASOPHILS NFR BLD AUTO: 1 %
EOSINOPHIL # BLD AUTO: 0.2 10E3/UL (ref 0–0.7)
EOSINOPHIL NFR BLD AUTO: 4 %
ERYTHROCYTE [DISTWIDTH] IN BLOOD BY AUTOMATED COUNT: 20.7 % (ref 10–15)
HCT VFR BLD AUTO: 28.3 % (ref 35–47)
HGB BLD-MCNC: 8.6 G/DL (ref 11.7–15.7)
LYMPHOCYTES # BLD AUTO: 1.6 10E3/UL (ref 0.8–5.3)
LYMPHOCYTES NFR BLD AUTO: 29 %
MCH RBC QN AUTO: 24.1 PG (ref 26.5–33)
MCHC RBC AUTO-ENTMCNC: 30.4 G/DL (ref 31.5–36.5)
MCV RBC AUTO: 79 FL (ref 78–100)
MONOCYTES # BLD AUTO: 0.6 10E3/UL (ref 0–1.3)
MONOCYTES NFR BLD AUTO: 11 %
NEUTROPHILS # BLD AUTO: 3.2 10E3/UL (ref 1.6–8.3)
NEUTROPHILS NFR BLD AUTO: 56 %
PLATELET # BLD AUTO: 436 10E3/UL (ref 150–450)
RBC # BLD AUTO: 3.57 10E6/UL (ref 3.8–5.2)
WBC # BLD AUTO: 5.7 10E3/UL (ref 4–11)

## 2021-08-11 PROCEDURE — 99213 OFFICE O/P EST LOW 20 MIN: CPT | Performed by: NURSE PRACTITIONER

## 2021-08-11 PROCEDURE — 36415 COLL VENOUS BLD VENIPUNCTURE: CPT | Performed by: NURSE PRACTITIONER

## 2021-08-11 PROCEDURE — 85025 COMPLETE CBC W/AUTO DIFF WBC: CPT | Performed by: NURSE PRACTITIONER

## 2021-08-11 RX ORDER — TIZANIDINE 2 MG/1
2 TABLET ORAL 3 TIMES DAILY PRN
Qty: 60 TABLET | Refills: 1 | Status: SHIPPED | OUTPATIENT
Start: 2021-08-11 | End: 2022-04-27

## 2021-08-11 RX ORDER — TRAMADOL HYDROCHLORIDE 50 MG/1
50 TABLET ORAL EVERY 6 HOURS PRN
Qty: 50 TABLET | Refills: 0 | Status: SHIPPED | OUTPATIENT
Start: 2021-08-11 | End: 2022-04-27

## 2021-08-11 RX ORDER — TRAMADOL HYDROCHLORIDE 50 MG/1
50 TABLET ORAL EVERY 8 HOURS PRN
Qty: 50 TABLET | Refills: 0 | Status: SHIPPED | OUTPATIENT
Start: 2021-08-11 | End: 2021-08-11

## 2021-08-11 ASSESSMENT — MIFFLIN-ST. JEOR: SCORE: 1039.58

## 2021-08-11 NOTE — PATIENT INSTRUCTIONS
Recheck blood count    Try Slow Fe iron 45 mg daily, usually less side effects than regular iron    Try Zanaflex 2 mg 3 times daily as needed for pain    Try percussion massage

## 2021-08-11 NOTE — PROGRESS NOTES
"    {PROVIDER CHARTING PREFERENCE:551731}    Saroj Carroll is a 70 year old who presents for the following health issues {ACCOMPANIED BY STATEMENT (Optional):383250}    HPI     Back Pain  Onset/Duration: ***  Description:   Location of pain: {.:456753}  Character of pain: {.:174075}  Pain radiation: {.:665819::\"none\"}  New numbness or weakness in legs, not attributed to pain: { :366630}  Intensity: {.:988432::\"Currently ***/10\"}  Progression of Symptoms: {.:580544}  History:   Specific cause: {.:938120::\"none\"}  Pain interferes with job: {.:383543::\" no \"}  History of back problems: {.:112251::\"no prior back problems\"}  Any previous MRI or X-rays: {.:569757::\"None\"}  Sees a specialist for back pain: { :224794::\"No\"}  Alleviating factors:   Improved by: {.:425908}    Precipitating factors:  Worsened by: {.:146973::\"Nothing\"}  Therapies tried and outcome: {.:683321}    Accompanying Signs & Symptoms:  Risk of Fracture: {.:702389::\"None\"}  Risk of Cauda Equina: {.:747752::\"None\"}  Risk of Infection: {.:722444::\"None\"}  Risk of Cancer: {.:351002::\"None\"}  Risk of Ankylosing Spondylitis: Onset at age <35, male, AND morning back stiffness {.:648663::\" no \"}    {TIP  If yes to any of the last 5 items or sudden/progressive weakness, consider imaging and/or surgical referral, if not already done :227863}  {TIP  When medically safe- First line medication for acute LBP: Acetaminophen, Second line: NSAIDS, muscle relaxants, and consider gabapentin for radiculopathy; opioids are usually not needed.  Can add <dot>pilbp in patient instructions :468798}  {additonal problems for provider to add (Optional):156520}    Review of Systems   {ROS COMP (Optional):291915}      Objective    There were no vitals taken for this visit.  There is no height or weight on file to calculate BMI.  Physical Exam   {Exam List (Optional):709028}    {Diagnostic Test Results (Optional):277787}    {AMBULATORY ATTESTATION (Optional):442951}        "

## 2021-08-11 NOTE — PROGRESS NOTES
"    Assessment & Plan     Chronic right-sided thoracic back pain    - tiZANidine (ZANAFLEX) 2 MG tablet; Take 1 tablet (2 mg) by mouth 3 times daily as needed for muscle spasms  - traMADol (ULTRAM) 50 MG tablet; Take 1 tablet (50 mg) by mouth every 6 hours as needed for severe pain    Cervical radiculopathy    - traMADol (ULTRAM) 50 MG tablet; Take 1 tablet (50 mg) by mouth every 6 hours as needed for severe pain    Iron deficiency anemia, unspecified iron deficiency anemia type    - CBC with platelets and differential; Future  - CBC with platelets and differential      LUCIANA Roberts CNP  [facility] LORENZA Carroll is a 70 year old who presents for the following health issues chronic right side middle back pain, tried physical therapy, massage therapy with mild improvement     HPI     Chronic/Recurring Back Pain Follow Up      Where is your back pain located? (Select all that apply) middle of back right    How would you describe your back pain?  stabbing    Where does your back pain spread? nowhere    Since your last clinic visit for back pain, how has your pain changed? gradually worsening at times depending how much work she has to do .     Does your back pain interfere with your job? YES    Since your last visit, have you tried any new treatment? Yes -  She goes to PT once a week         Review of Systems   Constitutional, HEENT, cardiovascular, pulmonary, gi and gu systems are negative, except as otherwise noted.      Objective    /70 (BP Location: Right arm, Patient Position: Sitting, Cuff Size: Adult Regular)   Pulse 67   Temp 97.4  F (36.3  C) (Tympanic)   Ht 1.511 m (4' 11.5\")   Wt 60.1 kg (132 lb 8 oz)   SpO2 100%   BMI 26.31 kg/m    Body mass index is 26.31 kg/m .  Physical Exam   GENERAL: healthy, alert and no distress  EYES: Eyes grossly normal to inspection, PERRL and conjunctivae and sclerae normal  MS: no gross musculoskeletal defects noted, no " edema  SKIN: no suspicious lesions or rashes  NEURO: Normal strength and tone, mentation intact and speech normal  PSYCH: mentation appears normal, affect normal/bright

## 2021-08-23 DIAGNOSIS — M79.2 THORACIC NEURALGIA: ICD-10-CM

## 2021-08-23 RX ORDER — GABAPENTIN 800 MG/1
800 TABLET ORAL 3 TIMES DAILY
Qty: 270 TABLET | Refills: 1 | Status: SHIPPED | OUTPATIENT
Start: 2021-08-23 | End: 2022-01-18

## 2021-08-23 NOTE — TELEPHONE ENCOUNTER
Received fax request from [facility] pharmacy requesting refill(s) for gabapentin (NEURONTIN) 800 MG tablet    Last refilled on 05/29/21    Pt last seen on 12/02/20  Next appt scheduled for : none    Will facilitate refill.

## 2021-09-03 ENCOUNTER — OFFICE VISIT (OUTPATIENT)
Dept: FAMILY MEDICINE | Facility: CLINIC | Age: 71
End: 2021-09-03
Payer: COMMERCIAL

## 2021-09-03 VITALS
BODY MASS INDEX: 25.58 KG/M2 | RESPIRATION RATE: 16 BRPM | HEART RATE: 73 BPM | TEMPERATURE: 97.7 F | DIASTOLIC BLOOD PRESSURE: 68 MMHG | OXYGEN SATURATION: 99 % | WEIGHT: 128.8 LBS | SYSTOLIC BLOOD PRESSURE: 130 MMHG

## 2021-09-03 DIAGNOSIS — K21.9 GASTROESOPHAGEAL REFLUX DISEASE, UNSPECIFIED WHETHER ESOPHAGITIS PRESENT: Primary | ICD-10-CM

## 2021-09-03 DIAGNOSIS — Z98.84 S/P GASTRIC BYPASS: ICD-10-CM

## 2021-09-03 DIAGNOSIS — K29.50 CHRONIC GASTRITIS, PRESENCE OF BLEEDING UNSPECIFIED, UNSPECIFIED GASTRITIS TYPE: ICD-10-CM

## 2021-09-03 DIAGNOSIS — D50.9 IRON DEFICIENCY ANEMIA, UNSPECIFIED IRON DEFICIENCY ANEMIA TYPE: ICD-10-CM

## 2021-09-03 DIAGNOSIS — D51.9 ANEMIA DUE TO VITAMIN B12 DEFICIENCY, UNSPECIFIED B12 DEFICIENCY TYPE: ICD-10-CM

## 2021-09-03 DIAGNOSIS — F33.42 RECURRENT MAJOR DEPRESSION IN COMPLETE REMISSION (H): ICD-10-CM

## 2021-09-03 DIAGNOSIS — K90.9 INTESTINAL MALABSORPTION, UNSPECIFIED TYPE: ICD-10-CM

## 2021-09-03 LAB
DEPRECATED CALCIDIOL+CALCIFEROL SERPL-MC: 24 UG/L (ref 20–75)
FOLATE SERPL-MCNC: 8.4 NG/ML
HGB BLD-MCNC: 8.5 G/DL (ref 11.7–15.7)
IRON SATN MFR SERPL: 9 % (ref 15–46)
IRON SERPL-MCNC: 45 UG/DL (ref 35–180)
PTH-INTACT SERPL-MCNC: 90 PG/ML (ref 18–80)
TIBC SERPL-MCNC: 502 UG/DL (ref 240–430)
VIT B12 SERPL-MCNC: 183 PG/ML (ref 193–986)

## 2021-09-03 PROCEDURE — 84425 ASSAY OF VITAMIN B-1: CPT | Mod: 90 | Performed by: FAMILY MEDICINE

## 2021-09-03 PROCEDURE — 83550 IRON BINDING TEST: CPT | Performed by: FAMILY MEDICINE

## 2021-09-03 PROCEDURE — 82746 ASSAY OF FOLIC ACID SERUM: CPT | Performed by: FAMILY MEDICINE

## 2021-09-03 PROCEDURE — 99214 OFFICE O/P EST MOD 30 MIN: CPT | Performed by: FAMILY MEDICINE

## 2021-09-03 PROCEDURE — 83970 ASSAY OF PARATHORMONE: CPT | Performed by: FAMILY MEDICINE

## 2021-09-03 PROCEDURE — 36415 COLL VENOUS BLD VENIPUNCTURE: CPT | Performed by: FAMILY MEDICINE

## 2021-09-03 PROCEDURE — 99000 SPECIMEN HANDLING OFFICE-LAB: CPT | Performed by: FAMILY MEDICINE

## 2021-09-03 PROCEDURE — 84590 ASSAY OF VITAMIN A: CPT | Mod: 90 | Performed by: FAMILY MEDICINE

## 2021-09-03 PROCEDURE — 82525 ASSAY OF COPPER: CPT | Mod: 90 | Performed by: FAMILY MEDICINE

## 2021-09-03 PROCEDURE — 84630 ASSAY OF ZINC: CPT | Mod: 90 | Performed by: FAMILY MEDICINE

## 2021-09-03 PROCEDURE — 82306 VITAMIN D 25 HYDROXY: CPT | Performed by: FAMILY MEDICINE

## 2021-09-03 PROCEDURE — 85018 HEMOGLOBIN: CPT | Performed by: FAMILY MEDICINE

## 2021-09-03 PROCEDURE — 82607 VITAMIN B-12: CPT | Performed by: FAMILY MEDICINE

## 2021-09-03 RX ORDER — OMEPRAZOLE 40 MG/1
40 CAPSULE, DELAYED RELEASE ORAL DAILY
Qty: 14 CAPSULE | Refills: 0 | Status: SHIPPED | OUTPATIENT
Start: 2021-09-03 | End: 2021-09-17

## 2021-09-03 NOTE — PROGRESS NOTES
"    Assessment & Plan     Gastroesophageal reflux disease, unspecified whether esophagitis present  &  Chronic gastritis, presence of bleeding unspecified, unspecified gastritis type  Plan treatment with 40mg omeprazole for 2 weeks, then 20mg for a month, then back to pepsid  Plan EGD  - omeprazole (PRILOSEC) 40 MG DR capsule; Take 1 capsule (40 mg) by mouth daily for 14 days  - omeprazole (PRILOSEC) 20 MG DR capsule; Take 1 capsule (20 mg) by mouth daily  - Adult Gastro Ref - Procedure Only; Future    Recurrent major depression in complete remission (H)  In remission    S/P gastric bypass: normal yearly labs, especially with the concern for the  Nerve pain.  -     Copper level; Future  -     Zinc; Future  -     Vitamin D Deficiency; Future  -     Vitamin A; Future  -     Vitamin B1 whole blood; Future  -     Vitamin B12; Future  -     Hemoglobin; Future  -     Iron and iron binding capacity; Future  -     Folate; Future  -     Parathyroid Hormone Intact; Future             BMI:   Estimated body mass index is 25.58 kg/m  as calculated from the following:    Height as of 8/11/21: 1.511 m (4' 11.5\").    Weight as of this encounter: 58.4 kg (128 lb 12.8 oz).   Weight management plan: Discussed healthy diet and exercise guidelines    See Patient Instructions    Return in about 3 months (around 12/3/2021) for when needing gabapenting.    Tim Cortez MD  [facility]    Saroj Carroll is a 70 year old who presents for the following health issues     HPI     Chronic/Recurring Back Pain Follow Up      Where is your back pain located? (Select all that apply) middle of back right    How would you describe your back pain?  burning and stabbing    Where does your back pain spread? nowhere    Since your last clinic visit for back pain, how has your pain changed? unchanged    Does your back pain interfere with your job? YES    Since your last visit, have you tried any new treatment? Yes -  " Gabapentin, muscle relaxer, Tramadol, and a heat pack.  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 So planning to retire the end of the year.  Did Physical therapy [facility] for the shoulder and back. Doing home Physical therapy and would like to do formal Physical therapy after retiring.  Gabapentin 800mg 3 times a day.  Tramadol in place of gabapentin if getting very bad especially around 2 pm if bad at work. Takes this once a week or less.  Never takes oxycodone.  Muscle relaxer is helpful but gets too groggy so can't take at work or driving.      How many servings of fruits and vegetables do you eat daily?  0-1    On average, how many sweetened beverages do you drink each day (Examples: soda, juice, sweet tea, etc.  Do NOT count diet or artificially sweetened beverages)?   1    How many days per week do you exercise enough to make your heart beat faster? 0    How many minutes a day do you exercise enough to make your heart beat faster? 0    How many days per week do you miss taking your medication? 0    GERD/Heartburn  Onset/Duration: ongoing for past year.   Description: Heartburn. Always happens at night. Patient states she even bought a bed that she can sit up in.  Intensity: Severe  Progression of Symptoms: same  Accompanying Signs & Symptoms:  Does it feel like food gets stuck or trouble swallowing: no  Nausea: no  Vomiting (bloody?): no  Abdominal Pain: no  Black-Tarry stools: no  Bloody stools: no  History:  Previous similar episodes: YES  Previous ulcers: YES- in the past. Not for a long time.  Precipitating factors:   Caffeine use: decaf coffee  Alcohol use: not applicable  NSAID/Aspirin use: not applicable  Tobacco use: not applicable  Worse with no particular food or drink.  Alleviating factors: None.   Therapies tried and outcome:             Lifestyle changes: None            Medications: Pepcid (famotidine) in afternoon, doesn't take it away.  Tried to chart  foods,   Last upper endoscopy 6/2016 after taking NSAIDs in the past this showed grade A reflux esophagitis, erythematous stomach mucosa and jejunal blood. Recommended prilosec 40mg for a month.         Review of Systems   Constitutional, HEENT, cardiovascular, pulmonary, gi and gu systems are negative, except as otherwise noted.      Objective    /68   Pulse 73   Temp 97.7  F (36.5  C) (Tympanic)   Resp 16   Wt 58.4 kg (128 lb 12.8 oz)   SpO2 99%   BMI 25.58 kg/m    Body mass index is 25.58 kg/m .  Physical Exam   GENERAL: healthy, alert and no distress  PSYCH: mentation appears normal, affect normal/bright

## 2021-09-03 NOTE — PATIENT INSTRUCTIONS
Start Omeprazole 40mg once daily 30-60 min before eating for 2 weeks.  Then step down to omeprazole 20mg  once daily 30-60 min before eating for 30 days.  Then step down to pepcid 2 times a day for a few weeks then once a day if needed.      Ok to take a women's daily multivitamin, to get some magnesium, some folic acid, calcium, some iron.      Patient Education     Discharge Instructions for Gastroesophageal Reflux Disease (GERD)  Gastroesophageal reflux disease (GERD) is when acid flows back from the stomach into the swallowing tube (esophagus).  Home care  These home care steps can help you handle GERD:    Stay at a healthy weight. Get help to lose any extra pounds (kilograms).    Don't lie down after meals.    Don't eat late at night.    Raise the head of your bed by 4 to 6 inches. You can do this by placing wooden blocks or bed risers under the head of your bed. Or you can put a wedge under the mattress.    Don't wear tight-fitting clothes.    Don't eat foods that might bother your stomach, such as:  ? Alcohol  ? Fat  ? Chocolate  ? Caffeine  ? Spearmint or peppermint  ? Citrus and other acidic foods    Talk with your healthcare provider if you are taking certain medicines. These can make GERD symptoms worse:  ? Calcium channel blockers  ? Theophylline  ? Anticholinergic medicines, such as oxybutynin and benzatropine    Start an exercise program. Ask your healthcare provider how to get started. Simple activities, such as walking or gardening, can help.    Break the smoking habit. Join a stop-smoking program to improve your chances of success.    Limit alcohol intake to no more than 2 drinks a day.    Take your medicines as directed. Don t skip doses.    Don't take over-the-counter nonsteroidal anti-inflammatory drugs (NSAIDs), such as aspirin and ibuprofen, unless your healthcare provider advises them for certain health problems.     If possible, don't take nitrates (heart medicines, such as nitroglycerin and  isosorbide dinitrate).    Talk with your healthcare provider about treatment if you are pregnant. GERD can happen or get worse during pregnancy.  Follow-up care  Make a check-up visit as directed by our staff.  When to call the healthcare provider  Call your healthcare provider right away if you have:    Trouble swallowing    Pain when swallowing    Feeling of food caught in your chest or throat    Pain in the neck, chest, or back    Heartburn that causes you to vomit    Vomiting blood    Black or tarry stools (from digested blood)    More saliva (watering of the mouth) than usual    Weight loss of more than 3% to 5% of your total body weight in a month    Hoarseness or sore throat that won t go away    Choking, coughing, or wheezing  Bee[employer] last reviewed this educational content on 4/1/2019 2000-2021 [employer]. All rights reserved. This information is not intended as a substitute for professional medical care. Always follow your healthcare professional's instructions.

## 2021-09-06 LAB
ANNOTATION COMMENT IMP: ABNORMAL
COPPER SERPL-MCNC: 124.8 UG/DL
RETINYL PALMITATE SERPL-MCNC: <0.02 MG/L
VIT A SERPL-MCNC: 0.18 MG/L
VIT B1 PYROPHOSHATE BLD-SCNC: 63 NMOL/L
ZINC SERPL-MCNC: 57.2 UG/DL

## 2021-09-12 ENCOUNTER — HEALTH MAINTENANCE LETTER (OUTPATIENT)
Age: 71
End: 2021-09-12

## 2021-10-19 ENCOUNTER — HOSPITAL ENCOUNTER (OUTPATIENT)
Dept: MAMMOGRAPHY | Facility: CLINIC | Age: 71
Discharge: HOME | End: 2021-10-19
Attending: FAMILY MEDICINE | Admitting: FAMILY MEDICINE
Payer: COMMERCIAL

## 2021-10-19 DIAGNOSIS — Z12.31 VISIT FOR SCREENING MAMMOGRAM: ICD-10-CM

## 2021-10-19 PROCEDURE — 77063 BREAST TOMOSYNTHESIS BI: CPT

## 2021-11-17 ENCOUNTER — IMMUNIZATION (OUTPATIENT)
Dept: FAMILY MEDICINE | Facility: CLINIC | Age: 71
End: 2021-11-17
Payer: COMMERCIAL

## 2021-11-17 PROCEDURE — 91306 COVID-19,PF,MODERNA (18+ YRS BOOSTER .25ML): CPT

## 2021-11-17 PROCEDURE — 0064A COVID-19,PF,MODERNA (18+ YRS BOOSTER .25ML): CPT

## 2022-01-02 ENCOUNTER — HEALTH MAINTENANCE LETTER (OUTPATIENT)
Age: 72
End: 2022-01-02

## 2022-01-18 DIAGNOSIS — M79.2 THORACIC NEURALGIA: ICD-10-CM

## 2022-01-18 RX ORDER — GABAPENTIN 800 MG/1
800 TABLET ORAL 3 TIMES DAILY
Qty: 270 TABLET | Refills: 1 | Status: SHIPPED | OUTPATIENT
Start: 2022-01-18 | End: 2022-02-03

## 2022-01-18 NOTE — TELEPHONE ENCOUNTER
Received fax request from [facility] pharmacy requesting refill(s) for gabapentin (NEURONTIN) 800 MG tablet     Last refilled on 1/11/22     Pt last seen on 12/02/20  Next appt scheduled for : none     Will facilitate refill.

## 2022-01-26 NOTE — ED AVS SNAPSHOT
[facility]    [address] [address]    Phone:  [phone]    Fax:  [phone]                                       Carly Gregg   MRN: [id number]    Department:  [facility]   Date of Visit:  6/22/2017           After Visit Summary Signature Page     I have received my discharge instructions, and my questions have been answered. I have discussed any challenges I see with this plan with the nurse or doctor.    ..........................................................................................................................................  Patient/Patient Representative Signature      ..........................................................................................................................................  Patient Representative Print Name and Relationship to Patient    ..................................................               ................................................  Date                                            Time    ..........................................................................................................................................  Reviewed by Signature/Title    ...................................................              ..............................................  Date                                                            Time           Reason for call:  Patient reporting a symptom    Symptom or request: Checking status of previous message. States she woke up with Migraine this morning and is leaving for [place] tonight. Please call today asap to discuss medication. Thank  You.    Duration (how long have symptoms been present): since 6:30 am today    Have you been treated for this before? Yes    Additional comments: call soon, temples are sensitive    Phone Number patient can be reached at:  Cell number on file:    Telephone Information:   Mobile [phone]       Best Time:  Between 10 am and 11 am today    Can we leave a detailed message on this number:  YES    Call taken on 1/26/2022 at 8:52 AM by Kaia Cox

## 2022-02-02 NOTE — TELEPHONE ENCOUNTER
Per pt her rx is now going thru [facility] mail order pharmacy. Her order number is #[id number]. Please call pt with any questions. Customer Service number is [phone]      Regine Burris    [facility] [place]

## 2022-02-03 RX ORDER — GABAPENTIN 800 MG/1
800 TABLET ORAL 3 TIMES DAILY
Qty: 270 TABLET | Refills: 1 | Status: SHIPPED | OUTPATIENT
Start: 2022-02-03 | End: 2022-05-23

## 2022-02-23 ENCOUNTER — OFFICE VISIT (OUTPATIENT)
Dept: FAMILY MEDICINE | Facility: CLINIC | Age: 72
End: 2022-02-23
Payer: COMMERCIAL

## 2022-02-23 VITALS
WEIGHT: 125 LBS | HEIGHT: 60 IN | OXYGEN SATURATION: 100 % | DIASTOLIC BLOOD PRESSURE: 66 MMHG | BODY MASS INDEX: 24.54 KG/M2 | HEART RATE: 57 BPM | SYSTOLIC BLOOD PRESSURE: 130 MMHG | TEMPERATURE: 96.5 F

## 2022-02-23 DIAGNOSIS — G89.29 CHRONIC RIGHT-SIDED THORACIC BACK PAIN: Primary | ICD-10-CM

## 2022-02-23 DIAGNOSIS — M54.6 CHRONIC RIGHT-SIDED THORACIC BACK PAIN: Primary | ICD-10-CM

## 2022-02-23 PROCEDURE — 99214 OFFICE O/P EST MOD 30 MIN: CPT | Performed by: FAMILY MEDICINE

## 2022-02-23 RX ORDER — TIZANIDINE 2 MG/1
2-4 TABLET ORAL 3 TIMES DAILY PRN
Qty: 180 TABLET | Refills: 3 | Status: SHIPPED | OUTPATIENT
Start: 2022-02-23 | End: 2022-08-23

## 2022-02-23 RX ORDER — TRAMADOL HYDROCHLORIDE 50 MG/1
50-100 TABLET ORAL EVERY 6 HOURS PRN
Qty: 40 TABLET | Refills: 3 | Status: SHIPPED | OUTPATIENT
Start: 2022-02-23 | End: 2022-06-15

## 2022-02-23 NOTE — LETTER
[facility]  -- Controlled Medication Agreement    2/23/2022   Carly Stephensannia   1950   [id number]       I understand that my provider is prescribing controlled medication (i.e., opioids, tranquilizers, barbiturates) to assist me in managing my chronic pain that has not responded to other treatments.  These medications are intended to decrease pain in order to improve function and/or ability to work.  The risks, benefits, and side effects or these medications have been explained to me and I agree to the following conditions for this type of treatment.    1. I will participate in other treatments (i.e., physical therapy, behavioral therapy, groups,) that my provider recommends.  I will be ready to taper or discontinue medications as other reasonable and effective treatments become available.  I understand I may be expected to see a health psychologist and a physical therapist at the discretion of my physician for ongoing functional assessment.  I will follow through with any recommendations made at this visit.    2. I will take my medications exactly as prescribed and will not change the medication dosage or schedule without my provider s approval.  Refills will not be given if I  run out early .  3. I will keep all regular appointments at the clinic (this includes nurse appointments and appointments with PT or behavioral medicine).  If I have three or more missed or cancelled appointments my medications may be discontinued.  4. I will not request or accept prescriptions for controlled substances from other physicians or individuals for my chronic pain condition.  If I develop another condition that requires the prescription of a controlled medication or if I am hospitalized for any reason, I will inform the clinic within one business day of receiving any treatment or medications.  5. I will designate one pharmacy where all of my prescriptions will be filled.  6. I will bring in the containers  of all medications prescribed each time I see my provider or a nurse even if there is no medication remaining.  This must be the original container from the pharmacy.  7. Refills of controlled medications will be made only during regular office hours, during a scheduled appointment with my provider or nurse.   8. I am responsible for my prescriptions.  If the medication is lost or stolen, I understand it will not be replaced.  9. I agree to abstain from all illegal and recreational drugs and will provide urine or blood specimens to monitor my compliance.  10. I will notify my nurse or provider immediately if I become pregnant.  11. I understand that controlled medications can affect my thinking and judgment and may interfere with my ability to drive.  I will not drive if I have this concern and will not drive if any dosage adjustments are made until my body has adjusted.        I understand that if I violate any of the above conditions my prescription medications and/or treatment may be terminated.  If the violation includes obtaining any controlled substances from other healthcare providers or individuals a report may be made to my physician, pharmacy, and other authorities including the police.    I have read this agreement and it has been explained to me.  I fully understand the consequences of violating this agreement.        _________________                             ___________                _________________       Patient Signature                                Date                              Witness      _________________  Namita Perkins MD

## 2022-02-23 NOTE — PROGRESS NOTES
Assessment & Plan     Chronic right-sided thoracic back pain    - Physical Therapy Referral; Future  - tiZANidine (ZANAFLEX) 2 MG tablet; Take 1-2 tablets (2-4 mg) by mouth 3 times daily as needed for muscle spasms  - traMADol (ULTRAM) 50 MG tablet; Take 1-2 tablets ( mg) by mouth every 6 hours as needed for severe pain  - Acupuncture Referral; Future    Salon pas x2 applied to the area      Patient Instructions   Ask for a physical therapist that does dry needling if possible       Return in about 3 months (around 5/23/2022) for Physical Exam, Lab Work.    Namita Perkins MD  [facility] GEORGE Carroll is a 71 year old who presents for the following health issues     History of Present Illness       Back Pain:  She presents for follow up of back pain. Patient's back pain is a chronic problem.  Location of back pain:  Right lower back, right middle of back, right upper back, right side of neck, right shoulder and right side of waist  Description of back pain: burning and sharp  Back pain spreads: right shoulder and right side of neck    Since patient first noticed back pain, pain is: gradually worsening  Does back pain interfere with her job:  Not applicable      She eats 0-1 servings of fruits and vegetables daily.She consumes 0 sweetened beverage(s) daily.She exercises with enough effort to increase her heart rate 10 to 19 minutes per day.  She exercises with enough effort to increase her heart rate 3 or less days per week.   She is taking medications regularly.     Chief Complaint   Patient presents with     Establish Care     Back Pain     on going, would lke referral to PT. Seen Chiropractor few weeks ago, had xrays done-has a copy,        As above we did discuss pain patches, acupuncture and tp injection  Has had the pain for 2 years. She had tostop working because of this       Review of Systems   Constitutional, HEENT, cardiovascular, pulmonary, gi and gu systems are  "negative, except as otherwise noted.      Objective    /66   Pulse 57   Temp (!) 96.5  F (35.8  C) (Tympanic)   Ht 1.511 m (4' 11.5\")   Wt 56.7 kg (125 lb)   SpO2 100%   BMI 24.82 kg/m    Body mass index is 24.82 kg/m .  Physical Exam   GENERAL: healthy, alert and no distress  MS: no gross musculoskeletal defects noted, no edema  SKIN: no suspicious lesions or rashes  Comprehensive back pain exam:  Tenderness of right lower medial scapular area and sensation in the area is intact   PSYCH: mentation appears normal, affect normal/bright        Namita Perkins M.D.          "

## 2022-02-24 ENCOUNTER — HOSPITAL ENCOUNTER (OUTPATIENT)
Dept: PHYSICAL THERAPY | Facility: CLINIC | Age: 72
Setting detail: THERAPIES SERIES
End: 2022-02-24
Attending: FAMILY MEDICINE
Payer: COMMERCIAL

## 2022-02-24 PROCEDURE — 97140 MANUAL THERAPY 1/> REGIONS: CPT | Mod: GP | Performed by: PHYSICAL THERAPIST

## 2022-02-24 PROCEDURE — 97161 PT EVAL LOW COMPLEX 20 MIN: CPT | Mod: GP | Performed by: PHYSICAL THERAPIST

## 2022-02-24 ASSESSMENT — ANXIETY QUESTIONNAIRES
GAD7 TOTAL SCORE: 2
6. BECOMING EASILY ANNOYED OR IRRITABLE: SEVERAL DAYS
7. FEELING AFRAID AS IF SOMETHING AWFUL MIGHT HAPPEN: NOT AT ALL
3. WORRYING TOO MUCH ABOUT DIFFERENT THINGS: NOT AT ALL
1. FEELING NERVOUS, ANXIOUS, OR ON EDGE: NOT AT ALL
2. NOT BEING ABLE TO STOP OR CONTROL WORRYING: NOT AT ALL
5. BEING SO RESTLESS THAT IT IS HARD TO SIT STILL: NOT AT ALL

## 2022-02-24 ASSESSMENT — PATIENT HEALTH QUESTIONNAIRE - PHQ9
5. POOR APPETITE OR OVEREATING: SEVERAL DAYS
SUM OF ALL RESPONSES TO PHQ QUESTIONS 1-9: 4

## 2022-02-24 NOTE — PROGRESS NOTES
[facility]    OUTPATIENT PHYSICAL THERAPY ORTHOPEDIC EVALUATION  PLAN OF TREATMENT FOR OUTPATIENT REHABILITATION  (COMPLETE FOR INITIAL CLAIMS ONLY)  Patient's Last Name, First Name, M.I.  YOB: 1950  MarysolCarly  [facility]    Provider s Name:  [facility]   Medical Record No.  [id number]   Start of Care Date:  02/24/22   Onset Date:  01/02/20   Type:     _X__PT   ___OT   ___SLP Medical Diagnosis:  Chronic right-sided thoracic back pain      PT Diagnosis:  Chronic thoracic back pain    Visits from SOC:  1      _________________________________________________________________________________  Plan of Treatment/Functional Goals:  joint mobilization, manual therapy, neuromuscular re-education, ROM, strengthening, stretching     Cryotherapy, Electrical stimulation, Hot packs, TENS, Traction     Goals  Goal Identifier: STG1  Goal Description: Pt will be able to sit for longer than 45 minutes before onset of back pain   Target Date: 03/17/22    Goal Identifier: LTG1  Goal Description: Pt will be able to sleep throught the night without being woken up from back pain   Target Date: 04/21/22    Goal Identifier: LTG2  Goal Description: Pt will be able to perform all ADL/IADL activities with pain of no greater than 3/10  Target Date: 04/21/22           Therapy Frequency:  2 times/Week  Predicted Duration of Therapy Intervention:  6-8 weeks, decreasing as appropriate    Adrian Tabor, PT                 I CERTIFY THE NEED FOR THESE SERVICES FURNISHED UNDER        THIS PLAN OF TREATMENT AND WHILE UNDER MY CARE     (Physician co-signature of this document indicates review and certification of the therapy plan).                       Certification Date From:  02/24/22   Certification Date To:  04/21/22    Referring Provider:  Namita Perkins MD    Initial Assessment        See Epic  Evaluation Start of Care Date: 02/24/22

## 2022-02-24 NOTE — PROGRESS NOTES
Thoracic/Lumbar Physical Therapy Evaluation   Carly Gregg 02/24/22 0800   General Information   Type of Visit Initial OP Ortho PT Evaluation   Start of Care Date 02/24/22   Referring Physician Namita Perkins MD   Patient/Family Goals Statement To improve her pain levels with daily tasks.    Orders Evaluate and Treat   Date of Order 02/23/22   Certification Required? 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 States that her back pain is constant and only gets relief with some previous therapy exercises and heat/pain medications. Has sone PT and chiropractic in the past to address her pain and stays consistent with her previous HEP which does seem to help some when her back is more painful.    Impairments A. Pain;L. Tingling   Functional Limitations perform activities of daily living;perform desired leisure / sports activities   Symptom Location right side thoracic spine from T4-T8   How/Where did it occur From insidious onset   Onset date of current episode/exacerbation 01/02/20   Chronicity Chronic   Pain rating (0-10 point scale) Best (/10);Worst (/10)   Worst (/10) 7   Pain quality A. Sharp;C. Aching   Frequency of pain/symptoms A. Constant   Pain/symptoms are: The same all the time   Pain/symptoms exacerbated by A. Sitting;C. Lifting;D. Carrying;K. Home tasks   Pain/symptoms eased by C. Rest;G. Heat;K. Other  (pain medication)   Progression of symptoms since onset: Unchanged   Prior Level of  Function   Prior Level of Function-Mobility independent   Prior Level of Function-ADLs independent   Current Level of Function   Patient role/employment history F. Retired   Fall Risk Screen   Fall screen completed by PT   Have you fallen 2 or more times in the past year? No   Have you fallen and had an injury in the past year? No   Is patient a fall risk? No   Abuse Screen (yes response referral indicated)   Feels Unsafe at Home or Work/School no   Feels Threatened by Someone no   Does Anyone Try to Keep You From Having Contact with Others or Doing Things Outside Your Home? no   Physical Signs of Abuse Present no   System Outcome Measures   Outcome Measures Low Back Pain (see Oswestry and Evelyn)  (ANGELINE: 13/45 = 28.8%)   Lumbar Spine/SI Objective Findings   Observation Pt does not appear to be in any acute distress.   Posture mildly rounded shoulders, mild increase in kyphosis of tspine    Gait/Locomotion non antalgic but limited pelvic rotation during ambulation    Flexion ROM 25% limited    Extension ROM 25% limited    Lumbar ROM Comment WFL   Hip Flexion (L2) Strength 4+/5   Hip Abduction Strength 4-/5   Hip Adduction Strength 5/5   Hip Extension Strength 4/5   Knee Flexion Strength 4+/5   Knee Extension (L3) Strength 5/5   SLR negative    Slump Test negative    Segmental Mobility hypomobile through T-spine with PA testing    Sensation Testing normal    Palpation tender over thoracic and lumbar paraspinals on Right from T4-L4   Planned Therapy Interventions   Planned Therapy Interventions joint mobilization;manual therapy;neuromuscular re-education;ROM;strengthening;stretching   Planned Modality Interventions   Planned Modality Interventions Cryotherapy;Electrical stimulation;Hot packs;TENS;Traction   Clinical Impression   Criteria for Skilled Therapeutic Interventions Met yes, treatment indicated   PT Diagnosis Chronic thoracic back pain    Influenced by the following impairments increased pain, weakness of  spinal stabilizing and periscapular musculature,   Functional limitations due to impairments diffiulty with prolonged positioning and performins ADL/IADL's    Clinical Presentation Evolving/Changing   Clinical Presentation Rationale chronic nature with persistent pain with increased activity    Clinical Decision Making (Complexity) Low complexity   Therapy Frequency 2 times/Week   Predicted Duration of Therapy Intervention (days/wks) 6-8 weeks, decreasing as appropriate   Risk & Benefits of therapy have been explained Yes   Patient, Family & other staff in agreement with plan of care Yes   Clinical Impression Comments pt with primary c/o thorcic back pain with activity and prolonged sitting. Has performed PT and chiro treatments in the past with mild improvement.    Education Assessment   Preferred Learning Style Listening;Reading;Demonstration   Barriers to Learning No barriers   ORTHO GOALS   PT Ortho Eval Goals 1;2;3   Ortho Goal 1   Goal Identifier STG1   Goal Description Pt will be able to sit for longer than 45 minutes before onset of back pain    Target Date 03/17/22   Ortho Goal 2   Goal Identifier LTG1   Goal Description Pt will be able to sleep throught the night without being woken up from back pain    Target Date 04/21/22   Ortho Goal 3   Goal Identifier LTG2   Goal Description Pt will be able to perform all ADL/IADL activities with pain of no greater than 3/10   Target Date 04/21/22   Total Evaluation Time   PT Eval, Low Complexity Minutes (31326) 20   Therapy Certification   Certification date from 02/24/22   Certification date to 04/21/22   Medical Diagnosis Chronic right-sided thoracic back pain

## 2022-02-25 ASSESSMENT — ANXIETY QUESTIONNAIRES: GAD7 TOTAL SCORE: 2

## 2022-03-03 ENCOUNTER — HOSPITAL ENCOUNTER (OUTPATIENT)
Dept: PHYSICAL THERAPY | Facility: CLINIC | Age: 72
Setting detail: THERAPIES SERIES
End: 2022-03-03
Attending: FAMILY MEDICINE
Payer: COMMERCIAL

## 2022-03-03 PROCEDURE — 97140 MANUAL THERAPY 1/> REGIONS: CPT | Mod: GP | Performed by: PHYSICAL THERAPIST

## 2022-03-10 ENCOUNTER — HOSPITAL ENCOUNTER (OUTPATIENT)
Dept: PHYSICAL THERAPY | Facility: CLINIC | Age: 72
Setting detail: THERAPIES SERIES
Discharge: HOME | End: 2022-03-10
Attending: FAMILY MEDICINE
Payer: COMMERCIAL

## 2022-03-10 PROCEDURE — 97140 MANUAL THERAPY 1/> REGIONS: CPT | Mod: GP | Performed by: PHYSICAL THERAPIST

## 2022-03-17 ENCOUNTER — HOSPITAL ENCOUNTER (OUTPATIENT)
Dept: PHYSICAL THERAPY | Facility: CLINIC | Age: 72
Setting detail: THERAPIES SERIES
Discharge: HOME | End: 2022-03-17
Attending: FAMILY MEDICINE
Payer: COMMERCIAL

## 2022-03-17 PROCEDURE — 97140 MANUAL THERAPY 1/> REGIONS: CPT | Mod: GP | Performed by: PHYSICAL THERAPIST

## 2022-03-24 ENCOUNTER — HOSPITAL ENCOUNTER (OUTPATIENT)
Dept: PHYSICAL THERAPY | Facility: CLINIC | Age: 72
Setting detail: THERAPIES SERIES
Discharge: HOME | End: 2022-03-24
Attending: FAMILY MEDICINE
Payer: COMMERCIAL

## 2022-03-24 PROCEDURE — 97140 MANUAL THERAPY 1/> REGIONS: CPT | Mod: GP | Performed by: PHYSICAL THERAPIST

## 2022-03-31 ENCOUNTER — HOSPITAL ENCOUNTER (OUTPATIENT)
Dept: PHYSICAL THERAPY | Facility: CLINIC | Age: 72
Setting detail: THERAPIES SERIES
Discharge: HOME | End: 2022-03-31
Attending: FAMILY MEDICINE
Payer: COMMERCIAL

## 2022-03-31 PROCEDURE — 97140 MANUAL THERAPY 1/> REGIONS: CPT | Mod: GP | Performed by: PHYSICAL THERAPIST

## 2022-04-05 ENCOUNTER — HOSPITAL ENCOUNTER (OUTPATIENT)
Dept: PHYSICAL THERAPY | Facility: CLINIC | Age: 72
Setting detail: THERAPIES SERIES
Discharge: HOME | End: 2022-04-05
Attending: FAMILY MEDICINE
Payer: COMMERCIAL

## 2022-04-05 PROCEDURE — 97140 MANUAL THERAPY 1/> REGIONS: CPT | Mod: GP | Performed by: PHYSICAL THERAPIST

## 2022-04-14 ENCOUNTER — HOSPITAL ENCOUNTER (OUTPATIENT)
Dept: PHYSICAL THERAPY | Facility: CLINIC | Age: 72
Setting detail: THERAPIES SERIES
Discharge: HOME | End: 2022-04-14
Attending: FAMILY MEDICINE
Payer: COMMERCIAL

## 2022-04-14 PROCEDURE — 97140 MANUAL THERAPY 1/> REGIONS: CPT | Mod: GP | Performed by: PHYSICAL THERAPIST

## 2022-04-21 ENCOUNTER — HOSPITAL ENCOUNTER (OUTPATIENT)
Dept: PHYSICAL THERAPY | Facility: CLINIC | Age: 72
Setting detail: THERAPIES SERIES
Discharge: HOME | End: 2022-04-21
Attending: FAMILY MEDICINE
Payer: COMMERCIAL

## 2022-04-21 PROCEDURE — 97140 MANUAL THERAPY 1/> REGIONS: CPT | Mod: GP | Performed by: PHYSICAL THERAPIST

## 2022-04-21 NOTE — PROGRESS NOTES
[facility]    OUTPATIENT PHYSICAL THERAPY  PLAN OF TREATMENT FOR OUTPATIENT REHABILITATION AND PROGRESS NOTE           Patient's Last Name, First Name, Carly Monzon Date of Birth  1950   Provider's Name  [facility] Medical Record No.  [id number]    Onset Date  2/23/22 (referral date) Start of Care Date  2/24/22   Type:     _X_PT   ___OT   ___SLP Medical Diagnosis  Chronic right-sided thoracic back pain    PT Diagnosis  Chronic thoracic pain Plan of Treatment  Frequency/Duration: 1x/week  Certification date from 4/21/22 to 5/2/22     Goals:  Goal Identifier STG1   Goal Description Pt will be able to sit for longer than 45 minutes before onset of back pain    Target Date 03/17/22 (4/21/22)   Date Met   4/21/22   Progress (detail required for progress note):       Goal Identifier LTG1   Goal Description Pt will be able to sleep throught the night without being woken up from back pain    Target Date 04/21/22 (ongoing)   Date Met      Progress (detail required for progress note):  Improvement in hours lost, but still not sleeping consistently through the night     Goal Identifier LTG2   Goal Description Pt will be able to perform all ADL/IADL activities with pain of no greater than 3/10   Target Date 04/21/22 (ongoing)   Date Met      Progress (detail required for progress note):  Improvement with   ADL's but still quite limited with computer work.              Beginning/End Dates of Progress Note Reporting Period:  2/24/22 to 4/21/22    Progress Toward Goals:   Progress this reporting period: see above for goals. Overall pain presentation has improved but still variable depending on activity levels. Working on her computer continues to be he most aggravating activity.     Client Self (Subjective) Report for Progress Note Reporting Period: Pt reports she  went to chiropractor yesterday due to some new left sided mid back pain but that is feeling better. Overal sitting tolerance has improved as well as general soreness through her shoulder and mid back. Has been consistent with her HEP and utilization of the theracane has helped with persistent soreness through her shoulder blade on the right. Still limited with computer work.      I CERTIFY THE NEED FOR THESE SERVICES FURNISHED UNDER        THIS PLAN OF TREATMENT AND WHILE UNDER MY CARE     (Physician co-signature of this document indicates review and certification of the therapy plan).                Referring Provider: MD Adrian Avendano, PT

## 2022-04-27 ENCOUNTER — OFFICE VISIT (OUTPATIENT)
Dept: FAMILY MEDICINE | Facility: CLINIC | Age: 72
End: 2022-04-27
Payer: COMMERCIAL

## 2022-04-27 VITALS
DIASTOLIC BLOOD PRESSURE: 68 MMHG | WEIGHT: 125 LBS | SYSTOLIC BLOOD PRESSURE: 114 MMHG | BODY MASS INDEX: 24.54 KG/M2 | TEMPERATURE: 97.3 F | HEIGHT: 60 IN | RESPIRATION RATE: 14 BRPM

## 2022-04-27 DIAGNOSIS — Z23 NEED FOR VACCINATION: ICD-10-CM

## 2022-04-27 DIAGNOSIS — L25.9 CONTACT DERMATITIS, UNSPECIFIED CONTACT DERMATITIS TYPE, UNSPECIFIED TRIGGER: Primary | ICD-10-CM

## 2022-04-27 PROCEDURE — 90715 TDAP VACCINE 7 YRS/> IM: CPT | Performed by: NURSE PRACTITIONER

## 2022-04-27 PROCEDURE — 99214 OFFICE O/P EST MOD 30 MIN: CPT | Mod: 25 | Performed by: NURSE PRACTITIONER

## 2022-04-27 PROCEDURE — 90471 IMMUNIZATION ADMIN: CPT | Performed by: NURSE PRACTITIONER

## 2022-04-27 RX ORDER — LORATADINE 10 MG/1
10 TABLET ORAL DAILY
COMMUNITY
Start: 2022-04-27 | End: 2022-06-15

## 2022-04-27 RX ORDER — BENZOCAINE/MENTHOL 6 MG-10 MG
LOZENGE MUCOUS MEMBRANE 2 TIMES DAILY
Qty: 40 G | Refills: 0 | Status: SHIPPED | OUTPATIENT
Start: 2022-04-27 | End: 2022-05-07

## 2022-04-27 NOTE — PROGRESS NOTES
Assessment & Plan     Contact dermatitis, unspecified contact dermatitis type, unspecified trigger  Unclear etiology. Could be stress/pain related given her symptoms improve after her back pain improves, or contact dermatitis, or due to dry skin. Recommended thick emollient and short course topical steroids as well as daily claritin. Side effects, risks and benefits of medication were discussed with patient. Discussed how and when to take medication. She has physical with PCP in 1 month and will follow-up if symptoms are not improving.     - loratadine (CLARITIN) 10 MG tablet; Take 1 tablet (10 mg) by mouth daily  - hydrocortisone (CORTAID) 1 % external cream; Apply topically 2 times daily for 10 days    Need for vaccination  Due for Tdap, last done in 2011.    - TDAP VACCINE (Adacel, Boostrix)             Patient Instructions   Contact Dermatitis:  1. Unclear what is causing this. Your skin is dry on the back though. I would recommend a thick emollient cream like Aquafor or Aveeno.  2. For outbreaks, you can use the steroid cream. Apply a thin layer to the rash (okay to mix with lotion). Wash hands well afterwards.   3. After swimming in pool, shower to rinse off the chlorine.   4. Other treatments to help with symptom relief: oatmeal baths, calamine lotion, vinegar in water 50:50 solution.  5. Start taking Claritin daily to help reduce rash.       Tdap vaccine given in office today.       Return if symptoms worsen or fail to improve.    LUCIANA Goldberg CNP  [facility] GEORGE Carroll is a 71 year old who presents for the following health issues     HPI     Rash  Onset/Duration: x years - rash on back - has breakouts every few days  Description  Location: back  Character: look like scratches  Itching: severe at times  Intensity:  Moderate to  severe  Progression of Symptoms:  improving, worsening and intermittent  Accompanying signs and symptoms:   Fever: no  Body aches or joint  "pain: no  Sore throat symptoms: no  Recent cold symptoms: no  History:           Previous episodes of similar rash: yes  New exposures:  None  Recent travel: no  Exposure to similar rash: YES  Precipitating or alleviating factors:   Therapies tried and outcome: none    Additional provider notes: Off and on rash to the back. Years ago it started on her shoulder and she was diagnosed with shingles. Then it moved across her back and she was diagnosed with contact dermatitis. She states she doesn't use any new detergents and hasn't had any new exposures that she is aware of. States she has chronic shoulder/back pain and she wakes up in a lot of pain every morning until she takes her gabapentin and flexeril. States her back was very itching and irritated from the rash this morning, but then it improved after she took her medications.     Review of Systems   Skin: Positive for rash (pruritic).             Objective    /68 (BP Location: Right arm, Patient Position: Sitting, Cuff Size: Adult Regular)   Temp 97.3  F (36.3  C) (Tympanic)   Resp 14   Ht 1.511 m (4' 11.5\")   Wt 56.7 kg (125 lb)   Breastfeeding No   BMI 24.82 kg/m    Body mass index is 24.82 kg/m .  Physical Exam  Vitals reviewed.   Constitutional:       General: She is not in acute distress.     Appearance: Normal appearance. She is not ill-appearing or toxic-appearing.   Skin:     General: Skin is warm and dry.      Findings: Rash present. Rash is macular and papular.          Neurological:      Mental Status: She is alert and oriented to person, place, and time.   Psychiatric:         Behavior: Behavior normal.                    "

## 2022-04-27 NOTE — PATIENT INSTRUCTIONS
Contact Dermatitis:  Unclear what is causing this. Your skin is dry on the back though. I would recommend a thick emollient cream like Aquafor or Aveeno.  For outbreaks, you can use the steroid cream. Apply a thin layer to the rash (okay to mix with lotion). Wash hands well afterwards.   After swimming in pool, shower to rinse off the chlorine.   Other treatments to help with symptom relief: oatmeal baths, calamine lotion, vinegar in water 50:50 solution.  Start taking Claritin daily to help reduce rash.       Tdap vaccine given in office today.

## 2022-05-03 ENCOUNTER — HOSPITAL ENCOUNTER (OUTPATIENT)
Dept: PHYSICAL THERAPY | Facility: CLINIC | Age: 72
Setting detail: THERAPIES SERIES
Discharge: HOME | End: 2022-05-03
Attending: FAMILY MEDICINE
Payer: COMMERCIAL

## 2022-05-03 PROCEDURE — 97140 MANUAL THERAPY 1/> REGIONS: CPT | Mod: GP | Performed by: PHYSICAL THERAPIST

## 2022-05-03 NOTE — PROGRESS NOTES
05/03/22 1000   Signing Clinician's Name / Credentials   Signing clinician's name / credentials Cherie Brock, PT   Session Number   Session Number 10   Progress Note/Recertification   Progress Note Completed Date 05/03/22   Recertification Due Date 06/22/22   Ortho Goal 1   Goal Identifier STG1   Goal Description Pt will be able to sit for longer than 45 minutes before onset of back pain    Target Date 03/17/22  (4/21/22--MET)   Ortho Goal 2   Goal Identifier LTG1   Goal Description Pt will be able to sleep throught the night without being woken up from back pain    Target Date 06/22/22  (toss/turn--takes mm relaxer-- 5 hours of sleep)   Ortho Goal 3   Goal Identifier LTG2   Goal Description Pt will be able to perform all ADL/IADL activities with pain of no greater than 3/10   Target Date 06/22/22  (limited tolerance to activity)   Subjective Report   Subjective Report Pt states we cannot do the tool assisted STM as she had a skin irritation.  Pt states she has 12 ex she is doing at home---will bring in for review.   Pain level today 6/10  Pt is doing to [employer] ([employer]) swims/rowing machine.  Pt has TENS unit (recommended she use 1 hour/ day)   Objective Measures    CROM flex WNL, ext WNL but notes pain in back and neck,  Rot 80% w/ neck complaints    LROM flex 90%, ext WFL,  SB B 90%--pain w/ L SB    Moderate tenderness R thoracic into upper lumbar paraspinals   Plan    Cont 1X/wk up to 6 additional visits for MT, review ex program   comments Pt has met goal 1, cont to work towards other goals     RECERTIFICATION    Carly Gregg  1950  MRN: [id number]    Session Number: 10 since start of care.    Diagnosis: chronic R thoracic back pain  Onset Date: 2/23/22 (MD visit)  Start of Care: 2/24/22    Reasons for Continuing Treatment:   Ongoing pain/ tightness which limits patients ADL's    Frequency/Duration  1 times per week for 6 weeks for a total of 6 visits.    Recertification Period  5/2/22 -  6/22/22    Physician Signature:    Date:    X_______________________________________________________    Physician Name: Namita Perkins MD    I certify the need for these services furnished under this plan of treatment and while under my care. Physician co-signature of this document indicates review and certification of the therapy plan.  This signature may be written on paper, or electronically signed within EPIC.

## 2022-05-10 ENCOUNTER — HOSPITAL ENCOUNTER (OUTPATIENT)
Dept: PHYSICAL THERAPY | Facility: CLINIC | Age: 72
Setting detail: THERAPIES SERIES
Discharge: HOME | End: 2022-05-10
Attending: FAMILY MEDICINE
Payer: COMMERCIAL

## 2022-05-10 PROCEDURE — 97140 MANUAL THERAPY 1/> REGIONS: CPT | Mod: GP | Performed by: PHYSICAL THERAPIST

## 2022-05-23 DIAGNOSIS — M79.2 THORACIC NEURALGIA: ICD-10-CM

## 2022-05-23 RX ORDER — GABAPENTIN 800 MG/1
800 TABLET ORAL 3 TIMES DAILY
Qty: 90 TABLET | Refills: 0 | Status: SHIPPED | OUTPATIENT
Start: 2022-05-23 | End: 2022-08-17

## 2022-05-23 NOTE — TELEPHONE ENCOUNTER
Patient has not been seen in the Pain Clinic for over a year; will route refill request to PCP.      Received fax from pharmacy requesting refill(s) for gabapentin (NEURONTIN) 800 MG tablet     Last refilled on 3/15/22    Pt last seen on 12/20/2020  Next appt scheduled for none scheduled    E-prescribe to:    [facility] - [address], [address]

## 2022-05-26 ENCOUNTER — HOSPITAL ENCOUNTER (OUTPATIENT)
Dept: PHYSICAL THERAPY | Facility: CLINIC | Age: 72
Setting detail: THERAPIES SERIES
Discharge: HOME | End: 2022-05-26
Attending: FAMILY MEDICINE
Payer: COMMERCIAL

## 2022-05-26 PROCEDURE — 97140 MANUAL THERAPY 1/> REGIONS: CPT | Mod: GP | Performed by: PHYSICAL THERAPIST

## 2022-06-02 ENCOUNTER — HOSPITAL ENCOUNTER (OUTPATIENT)
Dept: PHYSICAL THERAPY | Facility: CLINIC | Age: 72
Setting detail: THERAPIES SERIES
Discharge: HOME | End: 2022-06-02
Attending: FAMILY MEDICINE
Payer: COMMERCIAL

## 2022-06-02 PROCEDURE — 97140 MANUAL THERAPY 1/> REGIONS: CPT | Mod: GP | Performed by: PHYSICAL THERAPIST

## 2022-06-07 ENCOUNTER — HOSPITAL ENCOUNTER (OUTPATIENT)
Dept: PHYSICAL THERAPY | Facility: CLINIC | Age: 72
Setting detail: THERAPIES SERIES
Discharge: HOME | End: 2022-06-07
Attending: FAMILY MEDICINE
Payer: COMMERCIAL

## 2022-06-07 PROCEDURE — 97140 MANUAL THERAPY 1/> REGIONS: CPT | Mod: GP | Performed by: PHYSICAL THERAPIST

## 2022-06-13 ASSESSMENT — ENCOUNTER SYMPTOMS
EYE PAIN: 0
PARESTHESIAS: 1
MYALGIAS: 1
HEMATURIA: 0
CHILLS: 0
HEADACHES: 0
FREQUENCY: 0
NERVOUS/ANXIOUS: 0
WEAKNESS: 0
COUGH: 0
DYSURIA: 0
ABDOMINAL PAIN: 0
HEARTBURN: 0
JOINT SWELLING: 0
SHORTNESS OF BREATH: 0
PALPITATIONS: 0
NAUSEA: 0
SORE THROAT: 0
HEMATOCHEZIA: 0
BREAST MASS: 0
ARTHRALGIAS: 1
DIZZINESS: 0
DIARRHEA: 0
CONSTIPATION: 0
FEVER: 0

## 2022-06-13 ASSESSMENT — ACTIVITIES OF DAILY LIVING (ADL): CURRENT_FUNCTION: NO ASSISTANCE NEEDED

## 2022-06-15 ENCOUNTER — OFFICE VISIT (OUTPATIENT)
Dept: FAMILY MEDICINE | Facility: CLINIC | Age: 72
End: 2022-06-15
Payer: COMMERCIAL

## 2022-06-15 VITALS
SYSTOLIC BLOOD PRESSURE: 114 MMHG | DIASTOLIC BLOOD PRESSURE: 60 MMHG | HEART RATE: 65 BPM | WEIGHT: 123 LBS | TEMPERATURE: 97.2 F | HEIGHT: 60 IN | BODY MASS INDEX: 24.15 KG/M2 | OXYGEN SATURATION: 100 %

## 2022-06-15 DIAGNOSIS — Z79.899 ENCOUNTER FOR LONG-TERM (CURRENT) USE OF MEDICATIONS: ICD-10-CM

## 2022-06-15 DIAGNOSIS — Z98.84 S/P GASTRIC BYPASS: ICD-10-CM

## 2022-06-15 DIAGNOSIS — Z13.220 SCREENING FOR HYPERLIPIDEMIA: ICD-10-CM

## 2022-06-15 DIAGNOSIS — F33.42 RECURRENT MAJOR DEPRESSION IN COMPLETE REMISSION (H): ICD-10-CM

## 2022-06-15 DIAGNOSIS — D50.8 OTHER IRON DEFICIENCY ANEMIA: ICD-10-CM

## 2022-06-15 DIAGNOSIS — Z00.00 ENCOUNTER FOR MEDICARE ANNUAL WELLNESS EXAM: Primary | ICD-10-CM

## 2022-06-15 LAB
CHOLEST SERPL-MCNC: 170 MG/DL
ERYTHROCYTE [DISTWIDTH] IN BLOOD BY AUTOMATED COUNT: 19.6 % (ref 10–15)
FASTING STATUS PATIENT QL REPORTED: YES
FERRITIN SERPL-MCNC: 6 NG/ML (ref 8–252)
HCT VFR BLD AUTO: 29.5 % (ref 35–47)
HDLC SERPL-MCNC: 78 MG/DL
HGB BLD-MCNC: 9 G/DL (ref 11.7–15.7)
LDLC SERPL CALC-MCNC: 75 MG/DL
MCH RBC QN AUTO: 24.5 PG (ref 26.5–33)
MCHC RBC AUTO-ENTMCNC: 30.5 G/DL (ref 31.5–36.5)
MCV RBC AUTO: 80 FL (ref 78–100)
NONHDLC SERPL-MCNC: 92 MG/DL
PLATELET # BLD AUTO: 450 10E3/UL (ref 150–450)
RBC # BLD AUTO: 3.68 10E6/UL (ref 3.8–5.2)
TRIGL SERPL-MCNC: 83 MG/DL
WBC # BLD AUTO: 3.4 10E3/UL (ref 4–11)

## 2022-06-15 PROCEDURE — 85027 COMPLETE CBC AUTOMATED: CPT | Performed by: FAMILY MEDICINE

## 2022-06-15 PROCEDURE — 99214 OFFICE O/P EST MOD 30 MIN: CPT | Mod: 25 | Performed by: FAMILY MEDICINE

## 2022-06-15 PROCEDURE — 36415 COLL VENOUS BLD VENIPUNCTURE: CPT | Performed by: FAMILY MEDICINE

## 2022-06-15 PROCEDURE — 82728 ASSAY OF FERRITIN: CPT | Performed by: FAMILY MEDICINE

## 2022-06-15 PROCEDURE — 99397 PER PM REEVAL EST PAT 65+ YR: CPT | Performed by: FAMILY MEDICINE

## 2022-06-15 PROCEDURE — 80061 LIPID PANEL: CPT | Performed by: FAMILY MEDICINE

## 2022-06-15 ASSESSMENT — ENCOUNTER SYMPTOMS
HEADACHES: 0
FEVER: 0
JOINT SWELLING: 0
CHILLS: 0
PALPITATIONS: 0
BREAST MASS: 0
MYALGIAS: 1
DIARRHEA: 0
WEAKNESS: 0
NERVOUS/ANXIOUS: 0
HEMATURIA: 0
ARTHRALGIAS: 1
DIZZINESS: 0
SORE THROAT: 0
FREQUENCY: 0
NAUSEA: 0
PARESTHESIAS: 1
CONSTIPATION: 0
HEMATOCHEZIA: 0
SHORTNESS OF BREATH: 0
COUGH: 0
HEARTBURN: 0
ABDOMINAL PAIN: 0
EYE PAIN: 0
DYSURIA: 0

## 2022-06-15 ASSESSMENT — ACTIVITIES OF DAILY LIVING (ADL): CURRENT_FUNCTION: NO ASSISTANCE NEEDED

## 2022-06-15 NOTE — PATIENT INSTRUCTIONS
Patient Education   Personalized Prevention Plan  You are due for the preventive services outlined below.  Your care team is available to assist you in scheduling these services.  If you have already completed any of these items, please share that information with your care team to update in your medical record.  Health Maintenance Due   Topic Date Due    URINE DRUG SCREEN  Never done    ANNUAL REVIEW OF HM ORDERS  Never done    Cholesterol Lab  03/02/2021    COVID-19 Vaccine (4 - Booster for Moderna series) 03/17/2022       Understanding USDA MyPlate  The USDA has guidelines to help you make healthy food choices. These are called MyPlate. MyPlate shows the food groups that make up healthy meals using the image of a place setting. Before you eat, think about the healthiest choices for what to put on your plate or in your cup or bowl. To learn more about building a healthy plate, visit www.choosemyplate.gov.    The food groups  Fruits. Any fruit or 100% fruit juice counts as part of the Fruit Group. Fruits may be fresh, canned, frozen, or dried, and may be whole, cut-up, or pureed. Make 1/2 of your plate fruits and vegetables.  Vegetables. Any vegetable or 100% vegetable juice counts as a member of the Vegetable Group. Vegetables may be fresh, frozen, canned, or dried. They can be served raw or cooked and may be whole, cut-up, or mashed. Make 1/2 of your plate fruits and vegetables.  Grains. All foods made from grains are part of the Grains Group. These include wheat, rice, oats, cornmeal, and barley. Grains are often used to make foods such as bread, pasta, oatmeal, cereal, tortillas, and grits. Grains should be no more than 1/4 of your plate. At least half of your grains should be whole grains.  Protein. This group includes meat, poultry, seafood, beans and peas, eggs, processed soy products (such as tofu), nuts (including nut butters), and seeds. Make protein choices no more than 1/4 of your plate. Meat and  poultry choices should be lean or low fat.  Dairy. The Dairy Group includes all fluid milk products and foods made from milk that contain calcium, such as yogurt and cheese. (Foods that have little calcium, such as cream, butter, and cream cheese, are not part of this group.) Most dairy choices should be low-fat or fat-free.  Oils. Oils aren't a food group, but they do contain essential nutrients. However it's important to watch your intake of oils. These are fats that are liquid at room temperature. They include canola, corn, olive, soybean, vegetable, and sunflower oil. Foods that are mainly oil include mayonnaise, certain salad dressings, and soft margarines. You likely already get your daily oil allowance from the foods you eat.  Things to limit  Eating healthy also means limiting these things in your diet:     Salt (sodium). Many processed foods have a lot of sodium. To keep sodium intake down, eat fresh vegetables, meats, poultry, and seafood when possible. Purchase low-sodium, reduced-sodium, or no-salt-added food products at the store. And don't add salt to your meals at home. Instead, season them with herbs and spices such as dill, oregano, cumin, and paprika. Or try adding flavor with lemon or lime zest and juice.  Saturated fat. Saturated fats are most often found in animal products such as beef, pork, and chicken. They are often solid at room temperature, such as butter. To reduce your saturated fat intake, choose leaner cuts of meat and poultry. And try healthier cooking methods such as grilling, broiling, roasting, or baking. For a simple lower-fat swap, use plain nonfat yogurt instead of mayonnaise when making potato salad or macaroni salad.  Added sugars. These are sugars added to foods. They are in foods such as ice cream, candy, soda, fruit drinks, sports drinks, energy drinks, cookies, pastries, jams, and syrups. Cut down on added sugars by sharing sweet treats with a family member or friend. You  can also choose fruit for dessert, and drink water or other unsweetened beverages.     [employer] last reviewed this educational content on 6/1/2020 2000-2021 [employer]. All rights reserved. This information is not intended as a substitute for professional medical care. Always follow your healthcare professional's instructions.    Try taking the childrens chewable vitamins with iron for your iron supplement

## 2022-06-15 NOTE — PROGRESS NOTES
"SUBJECTIVE:   Carly Gregg is a 71 year old female who presents for Preventive Visit.    Patient has been advised of split billing requirements and indicates understanding: Yes  Are you in the first 12 months of your Medicare coverage?  No    Healthy Habits:     In general, how would you rate your overall health?  Good    Frequency of exercise:  4-5 days/week    Duration of exercise:  15-30 minutes    Do you usually eat at least 4 servings of fruit and vegetables a day, include whole grains    & fiber and avoid regularly eating high fat or \"junk\" foods?  No    Taking medications regularly:  Yes    Medication side effects:  None    Ability to successfully perform activities of daily living:  No assistance needed    Home Safety:  Lack of grab bars in the bathroom    Hearing Impairment:  No hearing concerns    In the past 6 months, have you been bothered by leaking of urine?  No    In general, how would you rate your overall mental or emotional health?  Good      PHQ-2 Total Score: 0    Additional concerns today:  No    Do you feel safe in your environment? Yes    Have you ever done Advance Care Planning? (For example, a Health Directive, POLST, or a discussion with a medical provider or your loved ones about your wishes): Yes, advance care planning is on file.    Fall risk  Fallen 2 or more times in the past year?: Yes  Any fall with injury in the past year?: No      Cognitive Screening   1) Repeat 3 items (Leader, Season, Table)    2) Clock draw: NORMAL  3) 3 item recall: Recalls 3 objects  Results: 3 items recalled: COGNITIVE IMPAIRMENT LESS LIKELY  Mini-CogTM Copyright DARIN Camilo. Licensed by the author for use in [facility]; reprinted with permission (aide@.[facility]). All rights reserved.        Do you have sleep apnea, excessive snoring or daytime drowsiness?: no    Reviewed and updated as needed this visit by clinical staff   Tobacco   Meds   Med Hx  Surg Hx  Fam Hx  Soc Hx          Reviewed " and updated as needed this visit by Provider                   Social History     Tobacco Use     Smoking status: Never Smoker     Smokeless tobacco: Never Used   Substance Use Topics     Alcohol use: Not Currently     Comment: Maybe a couple glasses of wine on special Holidays.     If you drink alcohol do you typically have >3 drinks per day or >7 drinks per week? No    No flowsheet data found.            Current providers sharing in care for this patient include:   Patient Care Team:  No Ref-Primary, Physician as PCP - Namita Montoya MD as Assigned PCP    The following health maintenance items are reviewed in Epic and correct as of today:  Health Maintenance Due   Topic Date Due     URINE DRUG SCREEN  Never done     ANNUAL REVIEW OF HM ORDERS  Never done     LIPID  03/02/2021     COVID-19 Vaccine (4 - Booster for Moderna series) 03/17/2022     Lab work is in process  Mammogram Screening: Mammogram Screening: Recommended mammography every 1-2 years with patient discussion and risk factor consideration        Review of Systems   Constitutional: Negative for chills and fever.   HENT: Negative for congestion, ear pain, hearing loss and sore throat.    Eyes: Negative for pain and visual disturbance.   Respiratory: Negative for cough and shortness of breath.    Cardiovascular: Negative for chest pain, palpitations and peripheral edema.   Gastrointestinal: Negative for abdominal pain, constipation, diarrhea, heartburn, hematochezia and nausea.   Breasts:  Negative for tenderness, breast mass and discharge.   Genitourinary: Negative for dysuria, frequency, genital sores, hematuria, pelvic pain, urgency, vaginal bleeding and vaginal discharge.   Musculoskeletal: Positive for arthralgias and myalgias. Negative for joint swelling.   Skin: Negative for rash.   Neurological: Positive for paresthesias. Negative for dizziness, weakness and headaches.   Psychiatric/Behavioral: Positive for mood changes. The patient is  "not nervous/anxious.          OBJECTIVE:   /60   Pulse 65   Temp 97.2  F (36.2  C) (Tympanic)   Ht 1.511 m (4' 11.5\")   Wt 55.8 kg (123 lb)   SpO2 100%   BMI 24.43 kg/m   Estimated body mass index is 24.43 kg/m  as calculated from the following:    Height as of this encounter: 1.511 m (4' 11.5\").    Weight as of this encounter: 55.8 kg (123 lb).  Physical Exam  GENERAL APPEARANCE: healthy, alert and no distress  EYES: Eyes grossly normal to inspection, PERRL and conjunctivae and sclerae normal  HENT: ear canals and TM's normal, nose and mouth without ulcers or lesions, oropharynx clear and oral mucous membranes moist  NECK: no adenopathy, no asymmetry, masses, or scars and thyroid normal to palpation  RESP: lungs clear to auscultation - no rales, rhonchi or wheezes  BREAST: normal without masses, tenderness or nipple discharge and no palpable axillary masses or adenopathy  CV: regular rate and rhythm, normal S1 S2, no S3 or S4, no murmur, click or rub, no peripheral edema and peripheral pulses strong  ABDOMEN: soft, nontender, no hepatosplenomegaly, no masses and bowel sounds normal  MS: no musculoskeletal defects are noted and gait is age appropriate without ataxia  SKIN: no suspicious lesions or rashes  NEURO: Normal strength and tone, sensory exam grossly normal, mentation intact and speech normal  PSYCH: mentation appears normal and affect normal/bright she reports periods of anxiouslnesss that are increasing and she is interested in trying a medication    Diagnostic Test Results:  Labs reviewed in Epic  No results found for this or any previous visit (from the past 24 hour(s)).    ASSESSMENT / PLAN:   (Z00.00) Encounter for Medicare annual wellness exam  (primary encounter diagnosis)  Comment:   Plan:     (Z79.383) Encounter for long-term (current) use of medications  Comment:   Plan:     (Z13.981) Screening for hyperlipidemia  Comment:   Plan: Lipid panel reflex to direct LDL Fasting        " "    (F33.42) Recurrent major depression in complete remission (H)  Comment:   Plan: sertraline (ZOLOFT) 50 MG tablet            (Z98.84) S/P gastric bypass  Comment:   Plan: Ferritin            (D50.8) Other iron deficiency anemia  Comment:   Plan: Ferritin, CBC with platelets        Has been plagued by low iron           COUNSELING:  Reviewed preventive health counseling, as reflected in patient instructions    Estimated body mass index is 24.43 kg/m  as calculated from the following:    Height as of this encounter: 1.511 m (4' 11.5\").    Weight as of this encounter: 55.8 kg (123 lb).        She reports that she has never smoked. She has never used smokeless tobacco.      Appropriate preventive services were discussed with this patient, including applicable screening as appropriate for cardiovascular disease, diabetes, osteopenia/osteoporosis, and glaucoma.  As appropriate for age/gender, discussed screening for colorectal cancer, prostate cancer, breast cancer, and cervical cancer. Checklist reviewing preventive services available has been given to the patient.    Reviewed patients plan of care and provided an AVS. The Basic Care Plan (routine screening as documented in Health Maintenance) for Carly meets the Care Plan requirement. This Care Plan has been established and reviewed with the Patient.    Counseling Resources:  ATP IV Guidelines  Pooled Cohorts Equation Calculator  Breast Cancer Risk Calculator  Breast Cancer: Medication to Reduce Risk  FRAX Risk Assessment  ICSI Preventive Guidelines  Dietary Guidelines for Americans, 2010  USDA's MyPlate  ASA Prophylaxis  Lung CA Screening    Namita Perkins MD  [facility]    Identified Health Risks:    The patient was counseled and encouraged to consider modifying their diet and eating habits. She was provided with information on recommended healthy diet options.  "

## 2022-06-16 ENCOUNTER — HOSPITAL ENCOUNTER (OUTPATIENT)
Dept: PHYSICAL THERAPY | Facility: CLINIC | Age: 72
Setting detail: THERAPIES SERIES
Discharge: HOME | End: 2022-06-16
Attending: FAMILY MEDICINE
Payer: COMMERCIAL

## 2022-06-16 PROCEDURE — 97140 MANUAL THERAPY 1/> REGIONS: CPT | Mod: GP | Performed by: PHYSICAL THERAPIST

## 2022-06-23 ENCOUNTER — HOSPITAL ENCOUNTER (OUTPATIENT)
Dept: PHYSICAL THERAPY | Facility: CLINIC | Age: 72
Setting detail: THERAPIES SERIES
Discharge: HOME | End: 2022-06-23
Attending: FAMILY MEDICINE
Payer: COMMERCIAL

## 2022-06-23 PROCEDURE — 97140 MANUAL THERAPY 1/> REGIONS: CPT | Mod: GP | Performed by: PHYSICAL THERAPIST

## 2022-06-23 NOTE — PROGRESS NOTES
[facility]    OUTPATIENT PHYSICAL THERAPY  PLAN OF TREATMENT FOR OUTPATIENT REHABILITATION AND PROGRESS NOTE           Patient's Last Name, First Name, Carly Monzon Date of Birth  1950   Provider's Name  [facility] Medical Record No.  [id number]    Onset Date  2/23/22 Start of Care Date  2/24/22   Type:     _X_PT   ___OT   ___SLP Medical Diagnosis  Chronic right sided thoracic back pain   PT Diagnosis  Chronic thoracic pain Plan of Treatment  Frequency/Duration: 1x/week  Certification date from 6/22/22 to 8/3/22     Goals:  Goal Identifier STG1   Goal Description Pt will be able to sit for longer than 45 minutes before onset of back pain    Target Date 03/17/22 (4/21/22)   Date Met      Progress (detail required for progress note):       Goal Identifier LTG1   Goal Description Pt will be able to sleep throught the night without being woken up from back pain    Target Date 06/22/22 (toss/turn--takes mm relaxer-- 5 hours of sleep)   Date Met      Progress (detail required for progress note):       Goal Identifier LTG2   Goal Description Pt will be able to perform all ADL/IADL activities with pain of no greater than 3/10   Target Date 06/22/22 (limited tolerance to activity)   Date Met      Progress (detail required for progress note):           I CERTIFY THE NEED FOR THESE SERVICES FURNISHED UNDER        THIS PLAN OF TREATMENT AND WHILE UNDER MY CARE     (Physician co-signature of this document indicates review and certification of the therapy plan).                Referring Provider: MD Adrian Mason, PT

## 2022-06-30 ENCOUNTER — HOSPITAL ENCOUNTER (OUTPATIENT)
Dept: PHYSICAL THERAPY | Facility: CLINIC | Age: 72
Setting detail: THERAPIES SERIES
Discharge: HOME | End: 2022-06-30
Attending: FAMILY MEDICINE
Payer: COMMERCIAL

## 2022-06-30 PROCEDURE — 97140 MANUAL THERAPY 1/> REGIONS: CPT | Mod: GP | Performed by: PHYSICAL THERAPIST

## 2022-07-07 ENCOUNTER — HOSPITAL ENCOUNTER (OUTPATIENT)
Dept: PHYSICAL THERAPY | Facility: CLINIC | Age: 72
Setting detail: THERAPIES SERIES
Discharge: HOME | End: 2022-07-07
Attending: FAMILY MEDICINE
Payer: COMMERCIAL

## 2022-07-07 PROCEDURE — 97140 MANUAL THERAPY 1/> REGIONS: CPT | Mod: GP | Performed by: PHYSICAL THERAPIST

## 2022-07-12 ENCOUNTER — HOSPITAL ENCOUNTER (OUTPATIENT)
Dept: PHYSICAL THERAPY | Facility: CLINIC | Age: 72
Setting detail: THERAPIES SERIES
Discharge: HOME | End: 2022-07-12
Attending: FAMILY MEDICINE
Payer: COMMERCIAL

## 2022-07-12 PROCEDURE — 97140 MANUAL THERAPY 1/> REGIONS: CPT | Mod: GP | Performed by: PHYSICAL THERAPIST

## 2022-07-21 ENCOUNTER — HOSPITAL ENCOUNTER (OUTPATIENT)
Dept: PHYSICAL THERAPY | Facility: CLINIC | Age: 72
Setting detail: THERAPIES SERIES
Discharge: HOME | End: 2022-07-21
Attending: FAMILY MEDICINE
Payer: COMMERCIAL

## 2022-07-21 PROCEDURE — 97140 MANUAL THERAPY 1/> REGIONS: CPT | Mod: GP | Performed by: PHYSICAL THERAPIST

## 2022-07-21 NOTE — PROGRESS NOTES
[facility]    Outpatient Physical Therapy Progress Note  Patient: Carly Gregg  : 1950    Beginning/End Dates of Reporting Period:  22 to 22    Referring Provider: Namita Perkins MD    Therapy Diagnosis: Chronic Right-sided thoracic back pain      Client Self Report: Pt reports that her lower back is getting a lot better but her thoracic spine and shoulder are still painful. pt reports pain levels are improving overall and becoming less frequency and less intense with continue therapies.      Goals:  Goal Identifier STG1   Goal Description Pt will be able to sit for longer than 45 minutes before onset of back pain   Target Date 22 (22)   Date Met  22   Progress (detail required for progress note): 22 able to sit 45-60 min     Goal Identifier LTG1   Goal Description Pt will be able to sleep throught the night without being woken up from back pain    Target Date 22 (toss/turn--takes mm relaxer-- 5 hours of sleep)   Date Met  22   Progress (detail required for progress note): 22 able to sleep thru the night w/ her pillow wedge but still waking     Goal Identifier LTG2   Goal Description Pt will be able to perform all ADL/IADL activities with pain of no greater than 3/10   Target Date 22 (limited tolerance to activity)   Date Met      Progress (detail required for progress note): 22 getting better particularly thourhg lumbar area, thoracic and right shouler still intermittently aggravating but improving in intensity and frequency       Plan:  Continue therapy per current plan of care, decreasing frequency to every other week. Pain levels improving to date though progressing quite slowly given the chronicity of her pain complaints.     Discharge:  No

## 2022-07-28 ENCOUNTER — HOSPITAL ENCOUNTER (OUTPATIENT)
Dept: PHYSICAL THERAPY | Facility: CLINIC | Age: 72
Setting detail: THERAPIES SERIES
Discharge: HOME | End: 2022-07-28
Attending: FAMILY MEDICINE
Payer: COMMERCIAL

## 2022-07-28 PROCEDURE — 97140 MANUAL THERAPY 1/> REGIONS: CPT | Mod: GP | Performed by: PHYSICAL THERAPIST

## 2022-08-03 ENCOUNTER — MYC MEDICAL ADVICE (OUTPATIENT)
Dept: FAMILY MEDICINE | Facility: CLINIC | Age: 72
End: 2022-08-03

## 2022-08-04 ENCOUNTER — HOSPITAL ENCOUNTER (OUTPATIENT)
Dept: PHYSICAL THERAPY | Facility: CLINIC | Age: 72
Setting detail: THERAPIES SERIES
Discharge: HOME | End: 2022-08-04
Attending: FAMILY MEDICINE
Payer: COMMERCIAL

## 2022-08-04 PROCEDURE — 97140 MANUAL THERAPY 1/> REGIONS: CPT | Mod: GP | Performed by: PHYSICAL THERAPIST

## 2022-08-05 ENCOUNTER — MYC MEDICAL ADVICE (OUTPATIENT)
Dept: FAMILY MEDICINE | Facility: CLINIC | Age: 72
End: 2022-08-05

## 2022-08-11 ENCOUNTER — HOSPITAL ENCOUNTER (OUTPATIENT)
Dept: PHYSICAL THERAPY | Facility: CLINIC | Age: 72
Setting detail: THERAPIES SERIES
Discharge: HOME | End: 2022-08-11
Attending: FAMILY MEDICINE
Payer: COMMERCIAL

## 2022-08-11 PROCEDURE — 97140 MANUAL THERAPY 1/> REGIONS: CPT | Mod: GP | Performed by: PHYSICAL THERAPIST

## 2022-08-11 NOTE — PROGRESS NOTES
[facility]    OUTPATIENT PHYSICAL THERAPY  PLAN OF TREATMENT FOR OUTPATIENT REHABILITATION AND PROGRESS NOTE           Patient's Last Name, First Name, Carly Monzon Date of Birth  1950   Provider's Name  [facility] Medical Record No.  [id number]    Onset Date  2/23/22 Start of Care Date  2/24/22   Type:     _X_PT   ___OT   ___SLP Medical Diagnosis  Chronic right sided thoracic back pain   PT Diagnosis  Chronic thoracic pain Plan of Treatment  Frequency/Duration: 1x/week  Certification date from 8/3/22 to 9/7/22     Goals:  Goal Identifier STG1   Goal Description Pt will be able to sit for longer than 45 minutes before onset of back pain   Target Date 03/17/22 (4/21/22)   Date Met  07/12/22   Progress (detail required for progress note): 7/12/22 able to sit 45-60 min     Goal Identifier LTG1   Goal Description Pt will be able to sleep throught the night without being woken up from back pain    Target Date 06/22/22 (toss/turn--takes mm relaxer-- 5 hours of sleep)   Date Met  07/12/22   Progress (detail required for progress note): 7/21/22 able to sleep thru the night w/ her pillow wedge but still waking     Goal Identifier LTG2   Goal Description Pt will be able to perform all ADL/IADL activities with pain of no greater than 3/10   Target Date 06/22/22 (limited tolerance to activity)   Date Met      Progress (detail required for progress note): 7/21/22 getting better particularly thourhg lumbar area, thoracic and right shouler still intermittently aggravating but improving in intensity and frequency            I CERTIFY THE NEED FOR THESE SERVICES FURNISHED UNDER        THIS PLAN OF TREATMENT AND WHILE UNDER MY CARE     (Physician co-signature of this document indicates review and certification of the therapy plan).                Referring Provider:  MD Adrian Mason, PT

## 2022-08-17 DIAGNOSIS — M79.2 THORACIC NEURALGIA: ICD-10-CM

## 2022-08-17 RX ORDER — GABAPENTIN 800 MG/1
800 TABLET ORAL 3 TIMES DAILY
Qty: 90 TABLET | Refills: 0 | Status: SHIPPED | OUTPATIENT
Start: 2022-08-17 | End: 2022-08-23

## 2022-08-17 NOTE — TELEPHONE ENCOUNTER
Routing refill request to provider for review/approval because:  Drug not on the [facility] refill protocol     Marvin Patiño RN

## 2022-08-18 ENCOUNTER — HOSPITAL ENCOUNTER (OUTPATIENT)
Dept: PHYSICAL THERAPY | Facility: CLINIC | Age: 72
Setting detail: THERAPIES SERIES
Discharge: HOME | End: 2022-08-18
Attending: FAMILY MEDICINE
Payer: COMMERCIAL

## 2022-08-18 PROCEDURE — 97140 MANUAL THERAPY 1/> REGIONS: CPT | Mod: GP | Performed by: PHYSICAL THERAPIST

## 2022-08-18 NOTE — TELEPHONE ENCOUNTER
Patient called reporting her pharmacy will not refill her Gabapentin without an office visit.  Patient has 4 weeks left of this medication and will be having extensive dental surgery 8/29/22 and unable to come into clinic for awhile after that date.    Chart reviewed,wellness exam was 6/15/22.    Patient scheduled 8/23/22, unsure if this appointment is needed.  If it is not needed, please update patient.  Patient is asking for 90 day supply of gabapentin to [facility].  Sharon Muhammad RN

## 2022-08-23 ENCOUNTER — OFFICE VISIT (OUTPATIENT)
Dept: FAMILY MEDICINE | Facility: CLINIC | Age: 72
End: 2022-08-23
Payer: COMMERCIAL

## 2022-08-23 VITALS
DIASTOLIC BLOOD PRESSURE: 70 MMHG | SYSTOLIC BLOOD PRESSURE: 110 MMHG | BODY MASS INDEX: 23.95 KG/M2 | TEMPERATURE: 96.8 F | WEIGHT: 122 LBS | HEIGHT: 60 IN | HEART RATE: 61 BPM | OXYGEN SATURATION: 99 %

## 2022-08-23 DIAGNOSIS — Z79.899 ENCOUNTER FOR LONG-TERM (CURRENT) USE OF MEDICATIONS: Primary | ICD-10-CM

## 2022-08-23 DIAGNOSIS — G89.29 CHRONIC RIGHT-SIDED THORACIC BACK PAIN: ICD-10-CM

## 2022-08-23 DIAGNOSIS — M79.2 THORACIC NEURALGIA: ICD-10-CM

## 2022-08-23 DIAGNOSIS — M54.6 CHRONIC RIGHT-SIDED THORACIC BACK PAIN: ICD-10-CM

## 2022-08-23 DIAGNOSIS — E53.8 VITAMIN B 12 DEFICIENCY: ICD-10-CM

## 2022-08-23 DIAGNOSIS — F33.42 RECURRENT MAJOR DEPRESSION IN COMPLETE REMISSION (H): ICD-10-CM

## 2022-08-23 DIAGNOSIS — D50.8 OTHER IRON DEFICIENCY ANEMIA: ICD-10-CM

## 2022-08-23 LAB
ERYTHROCYTE [DISTWIDTH] IN BLOOD BY AUTOMATED COUNT: 21.9 % (ref 10–15)
FERRITIN SERPL-MCNC: 9 NG/ML (ref 8–252)
HCT VFR BLD AUTO: 29.4 % (ref 35–47)
HGB BLD-MCNC: 9 G/DL (ref 11.7–15.7)
MCH RBC QN AUTO: 25.2 PG (ref 26.5–33)
MCHC RBC AUTO-ENTMCNC: 30.6 G/DL (ref 31.5–36.5)
MCV RBC AUTO: 82 FL (ref 78–100)
PLATELET # BLD AUTO: 393 10E3/UL (ref 150–450)
RBC # BLD AUTO: 3.57 10E6/UL (ref 3.8–5.2)
VIT B12 SERPL-MCNC: 1520 PG/ML (ref 232–1245)
WBC # BLD AUTO: 4 10E3/UL (ref 4–11)

## 2022-08-23 PROCEDURE — 82607 VITAMIN B-12: CPT | Performed by: FAMILY MEDICINE

## 2022-08-23 PROCEDURE — 36415 COLL VENOUS BLD VENIPUNCTURE: CPT | Performed by: FAMILY MEDICINE

## 2022-08-23 PROCEDURE — 99214 OFFICE O/P EST MOD 30 MIN: CPT | Performed by: FAMILY MEDICINE

## 2022-08-23 PROCEDURE — 82728 ASSAY OF FERRITIN: CPT | Performed by: FAMILY MEDICINE

## 2022-08-23 PROCEDURE — 85027 COMPLETE CBC AUTOMATED: CPT | Performed by: FAMILY MEDICINE

## 2022-08-23 RX ORDER — GABAPENTIN 800 MG/1
800 TABLET ORAL 3 TIMES DAILY
Qty: 270 TABLET | Refills: 3 | Status: SHIPPED | OUTPATIENT
Start: 2022-08-23 | End: 2023-05-08

## 2022-08-23 RX ORDER — TIZANIDINE 2 MG/1
2 TABLET ORAL 3 TIMES DAILY PRN
Qty: 270 TABLET | Refills: 3 | Status: SHIPPED | OUTPATIENT
Start: 2022-08-23 | End: 2023-06-07

## 2022-08-23 ASSESSMENT — PATIENT HEALTH QUESTIONNAIRE - PHQ9
SUM OF ALL RESPONSES TO PHQ QUESTIONS 1-9: 2
SUM OF ALL RESPONSES TO PHQ QUESTIONS 1-9: 2
10. IF YOU CHECKED OFF ANY PROBLEMS, HOW DIFFICULT HAVE THESE PROBLEMS MADE IT FOR YOU TO DO YOUR WORK, TAKE CARE OF THINGS AT HOME, OR GET ALONG WITH OTHER PEOPLE: NOT DIFFICULT AT ALL

## 2022-08-23 NOTE — PROGRESS NOTES
Assessment & Plan     Encounter for long-term (current) use of medications      Thoracic neuralgia  Refilled   - gabapentin (NEURONTIN) 800 MG tablet; Take 1 tablet (800 mg) by mouth 3 times daily  - CBC with platelets; Future  - CBC with platelets    Recurrent major depression in complete remission (H)  She is doing well on this she will stay on this dose  - sertraline (ZOLOFT) 50 MG tablet; Take 1 tablet daily    Chronic right-sided thoracic back pain  Uses 1-2 times per day very helpful   - tiZANidine (ZANAFLEX) 2 MG tablet; Take 1 tablet (2 mg) by mouth 3 times daily as needed for muscle spasms    Other iron deficiency anemia  Will recheck she has been on iron increaese iron intake if not improving with oral consider iron infusion   - CBC with platelets; Future  - Ferritin; Future  - CBC with platelets  - Ferritin    Vitamin B 12 deficiency  Results for orders placed or performed in visit on 08/23/22   CBC with platelets     Status: Abnormal   Result Value Ref Range    WBC Count 4.0 4.0 - 11.0 10e3/uL    RBC Count 3.57 (L) 3.80 - 5.20 10e6/uL    Hemoglobin 9.0 (L) 11.7 - 15.7 g/dL    Hematocrit 29.4 (L) 35.0 - 47.0 %    MCV 82 78 - 100 fL    MCH 25.2 (L) 26.5 - 33.0 pg    MCHC 30.6 (L) 31.5 - 36.5 g/dL    RDW 21.9 (H) 10.0 - 15.0 %    Platelet Count 393 150 - 450 10e3/uL   Ferritin     Status: Normal   Result Value Ref Range    Ferritin 9 8 - 252 ng/mL   Vitamin B12     Status: Abnormal   Result Value Ref Range    Vitamin B12 1,520 (H) 232 - 1,245 pg/mL       - Vitamin B12; Future  - Vitamin B12         See Patient Instructions    No follow-ups on file.    Namita Perkins MD  [facility] GEORGE Carroll is a 71 year old, presenting for the following health issues:  Recheck Medication      History of Present Illness       Back Pain:  She presents for follow up of back pain. Patient's back pain is a chronic problem.  Location of back pain:  Right lower back, right upper back, right  "side of neck and right shoulder  Description of back pain: gnawing  Back pain spreads: right shoulder and right side of neck    Since patient first noticed back pain, pain is: gradually improving  Does back pain interfere with her job:  Not applicable      She eats 2-3 servings of fruits and vegetables daily.She consumes 1 sweetened beverage(s) daily.She exercises with enough effort to increase her heart rate 10 to 19 minutes per day.  She exercises with enough effort to increase her heart rate 3 or less days per week.   She is taking medications regularly.    Today's PHQ-9         PHQ-9 Total Score: 2    PHQ-9 Q9 Thoughts of better off dead/self-harm past 2 weeks :   Not at all    How difficult have these problems made it for you to do your work, take care of things at home, or get along with other people: Not difficult at all         Having her dental surgery next week  She is still anemic will check with       Review of Systems   Constitutional, HEENT, cardiovascular, pulmonary, gi and gu systems are negative, except as otherwise noted.      Objective    /70   Pulse 61   Temp 96.8  F (36  C) (Tympanic)   Ht 1.511 m (4' 11.5\")   Wt 55.3 kg (122 lb)   SpO2 99%   BMI 24.23 kg/m    Body mass index is 24.23 kg/m .  Physical Exam   GENERAL: healthy, alert and no distress    Results for orders placed or performed in visit on 08/23/22 (from the past 24 hour(s))   CBC with platelets   Result Value Ref Range    WBC Count 4.0 4.0 - 11.0 10e3/uL    RBC Count 3.57 (L) 3.80 - 5.20 10e6/uL    Hemoglobin 9.0 (L) 11.7 - 15.7 g/dL    Hematocrit 29.4 (L) 35.0 - 47.0 %    MCV 82 78 - 100 fL    MCH 25.2 (L) 26.5 - 33.0 pg    MCHC 30.6 (L) 31.5 - 36.5 g/dL    RDW 21.9 (H) 10.0 - 15.0 %    Platelet Count 393 150 - 450 10e3/uL   Ferritin   Result Value Ref Range    Ferritin 9 8 - 252 ng/mL   Vitamin B12   Result Value Ref Range    Vitamin B12 1,520 (H) 232 - 1,245 pg/mL       Namita Perkins M.D.              .  ..  "

## 2022-09-15 ENCOUNTER — HOSPITAL ENCOUNTER (OUTPATIENT)
Dept: PHYSICAL THERAPY | Facility: CLINIC | Age: 72
Setting detail: THERAPIES SERIES
Discharge: HOME | End: 2022-09-15
Attending: FAMILY MEDICINE
Payer: COMMERCIAL

## 2022-09-15 PROCEDURE — 97140 MANUAL THERAPY 1/> REGIONS: CPT | Mod: GP | Performed by: PHYSICAL THERAPIST

## 2022-09-19 ENCOUNTER — MYC MEDICAL ADVICE (OUTPATIENT)
Dept: FAMILY MEDICINE | Facility: CLINIC | Age: 72
End: 2022-09-19

## 2022-09-19 DIAGNOSIS — Z98.84 S/P GASTRIC BYPASS: ICD-10-CM

## 2022-09-19 DIAGNOSIS — R63.4 UNINTENDED WEIGHT LOSS: Primary | ICD-10-CM

## 2022-09-19 DIAGNOSIS — N18.2 CKD (CHRONIC KIDNEY DISEASE) STAGE 2, GFR 60-89 ML/MIN: ICD-10-CM

## 2022-09-19 NOTE — PROGRESS NOTES
[facility]    OUTPATIENT PHYSICAL THERAPY  PLAN OF TREATMENT FOR OUTPATIENT REHABILITATION AND PROGRESS NOTE           Patient's Last Name, First Name, Carly Monzon Date of Birth  1950   Provider's Name  [facility] Medical Record No.  [id number]    Onset Date  2/23/22 Start of Care Date  2/24/22   Type:     _X_PT   ___OT   ___SLP Medical Diagnosis  Chronic right thoracic back pain   PT Diagnosis  Chronic thoracic pain Plan of Treatment  Frequency/Duration: 1x/wk x 8wk  Certification date from 9/7/22 to 11/2/22     Goals:  Goal Identifier STG1   Goal Description Pt will be able to sit for longer than 45 minutes before onset of back pain   Target Date 03/17/22 (4/21/22)   Date Met  07/12/22   Progress (detail required for progress note): 7/12/22 able to sit 45-60 min     Goal Identifier LTG1   Goal Description Pt will be able to sleep throught the night without being woken up from back pain    Target Date 06/22/22 (toss/turn--takes mm relaxer-- 5 hours of sleep)   Date Met  07/12/22   Progress (detail required for progress note): 7/21/22 able to sleep thru the night w/ her pillow wedge but still waking     Goal Identifier LTG2   Goal Description Pt will be able to perform all ADL/IADL activities with pain of no greater than 3/10   Target Date 06/22/22 (limited tolerance to activity)   Date Met      Progress (detail required for progress note): 8/18/22, significatn improvment in activity tolerance with low/mid back pain. R shoulder discomfort improved in that it is no longer a sharp pain with prolonged use and now more of a dull intermittent aching/throbbing.                I CERTIFY THE NEED FOR THESE SERVICES FURNISHED UNDER        THIS PLAN OF TREATMENT AND WHILE UNDER MY CARE     (Physician co-signature of this document indicates review and  certification of the therapy plan).                Referring Provider: LALO Casillas, PT

## 2022-09-26 ENCOUNTER — TELEPHONE (OUTPATIENT)
Dept: FAMILY MEDICINE | Facility: CLINIC | Age: 72
End: 2022-09-26

## 2022-09-26 NOTE — TELEPHONE ENCOUNTER
Patient's call transferred to author   Patient calling regarding diagnosis for nutrition consult and is worried as she has not been told she has kidney disease     Reviewed chart  Relayed Dr. Perkins's mychart message explaining rationale for diagnosis on the referral     Patient verbalized understanding  Will schedule nutrition consult   No further questions/concerns     Marvin Patiño RN

## 2022-09-26 NOTE — TELEPHONE ENCOUNTER
Patient returning call regarding dietician consult.   States she cannot get appointment until 11/7    Please call back and advise at [phone]    rTicia West RN on 9/26/2022 at 10:03 AM

## 2022-09-29 ENCOUNTER — HOSPITAL ENCOUNTER (OUTPATIENT)
Dept: PHYSICAL THERAPY | Facility: CLINIC | Age: 72
Setting detail: THERAPIES SERIES
Discharge: HOME | End: 2022-09-29
Attending: FAMILY MEDICINE
Payer: COMMERCIAL

## 2022-09-29 PROCEDURE — 97140 MANUAL THERAPY 1/> REGIONS: CPT | Mod: GP | Performed by: PHYSICAL THERAPIST

## 2022-09-29 PROCEDURE — 97110 THERAPEUTIC EXERCISES: CPT | Mod: GP | Performed by: PHYSICAL THERAPIST

## 2022-09-30 ENCOUNTER — VIRTUAL VISIT (OUTPATIENT)
Dept: ONCOLOGY | Facility: CLINIC | Age: 72
End: 2022-09-30
Attending: FAMILY MEDICINE
Payer: COMMERCIAL

## 2022-09-30 DIAGNOSIS — M27.69 FAILING DENTAL IMPLANT: ICD-10-CM

## 2022-09-30 DIAGNOSIS — N18.2 CKD (CHRONIC KIDNEY DISEASE) STAGE 2, GFR 60-89 ML/MIN: ICD-10-CM

## 2022-09-30 PROCEDURE — 97802 MEDICAL NUTRITION INDIV IN: CPT | Mod: TEL,95 | Performed by: DIETITIAN, REGISTERED

## 2022-09-30 NOTE — PROGRESS NOTES
"CLINICAL NUTRITION SERVICES - ASSESSMENT NOTE    Carly Gregg 72 year old referred for MNT related to CKD    Time Spent: 30 minutes  Visit Type: phone  Pt accompanied by: self  Referring Physician: Namita Perkins  N18.2 (ICD-10-CM) - CKD (chronic kidney disease) stage 2, GFR 60-89 ml/min  Additional Information:  patient with multiple dental surgeries losing weight    NUTRITION HISTORY  Factors affecting nutrition intake include: edentulous, difficulty chewing  Current diet/appetite: soft foods  She has a h/o GB surgery    Carly presents today with questions regarding food choices for ease of chewing that will give her good nutrition. She would also like to learn about what foods to consume/avoid with CKD. She tells me that over the past year her kidneys have been stressed due to NSAIDS.     She has lost over 15 lbs in the past month due to difficulty chewing.  Her weight has finally stablized, increase one pound in the past few days as she is finding more foods to eat.   She has a h/or gastric bypass surgery, thus, focuses on small, frequent meals.      Diet Recall  Breakfast Pears, oatmeal coffee w/ cream  Banana bread with butter   Lunch Scalloped potatoes and ham, brownie OR pea soup ham  Pears and jello, pudding   Dinner Chicken chow mein, white rice  Mac n cheese  Cheese ravioli   Snacks Canned fruit   Beverages Protein water, Plant based protein shake, water     ANTHROPOMETRICS  Height: 60\"  Weight: 122 lbs/55kg  BMI: 24  Weight Status:  Normal BMI  IBW: 100 lbs (122%)  Weight History: reported wt 108 lbs (2 weeks ago in clinic)  Wt Readings from Last 8 Encounters:   08/23/22 55.3 kg (122 lb)   06/15/22 55.8 kg (123 lb)   04/27/22 56.7 kg (125 lb)   02/23/22 56.7 kg (125 lb)   09/03/21 58.4 kg (128 lb 12.8 oz)   08/11/21 60.1 kg (132 lb 8 oz)   05/28/21 60.1 kg (132 lb 6.4 oz)   11/16/20 60.3 kg (133 lb)     Dosing Weight: 55kg    Medications/vitamins/minerals/herbals:   Reviewed, Centrum Silver, B12, " Iron     Labs:   Labs reviewed    NUTRITION FOCUSED PHYSICAL ASSESSMENT FOR DIAGNOSING MALNUTRITION:  Consult for education only    ASSESSED NUTRITION NEEDS:  Estimated Energy Needs: 1500 kcals (30 Kcal/Kg)  Justification: maintenance  Estimated Protein Needs: 44-55 grams protein (0.8-1 g pro/Kg)  Justification: maintenance and CKD  Estimated Fluid Needs: 1500  mL   Justification: maintenance    MALNUTRITION:  % Weight Loss:  > 5% in 1 month (severe malnutrition)  % Intake:  <75% for >/= 1 month (moderate malnutrition)  Subcutaneous Fat Loss:  None observed  Muscle Loss:  None observed  Fluid Retention:  None noted    Malnutrition Diagnosis: Moderate malnutrition  In Context of:  Acute illness or injury    NUTRITION DIAGNOSIS:  Inadequate oral intake related to edentulous as evidenced by 12 % wt loss x past one month due to difficulty chewing.     INTERVENTIONS  Provided written & verbal education:     - Reviewed nutrition and hydration needs. Advised pt to aim for at least 1500-1800kcal and 45-55g protein daily.  Advised pt to aim for 6 cups non-caffeine containing beverages (water/electrolytes) daily.  Reviewed protein goals for CKD and malnutrition status.  Encouraged to have protein at each meal  - Discussed strategies to help fortify meals and snacks via soft, easy to chew foods. Encouraged to focus on small, frequent meals w/ h/o stomach surgery.   - Encouraged to track her intake on MFP or using Calorie Solomon.  She also has a WW book that can help her track her nutrition.     Provided pt with corresponding education materials/handouts on:  High Calorie/High Protein Recipe booklet, Tips to Increase the Calories in Your Diet and Sources of Protein    Pt verbalize understanding of materials provided during consult.   Patient Understanding: good  Expected patient engagement: good     Goals  1.  Aim for 5-6 small frequent meals  2.  Aim for 1500-1800kcal and 45-55g protein/day  3. Weight maintenance     Follow-Up  Plans: Pt has RD contact information for questions.      Ade Hernandez, SLIM, LD

## 2022-09-30 NOTE — LETTER
"    9/30/2022         RE: Carly Gregg  [address] [address] [id number]        Dear Colleague,    Thank you for referring your patient, Carly Gregg, to the [facility]. Please see a copy of my visit note below.    CLINICAL NUTRITION SERVICES - ASSESSMENT NOTE    Carly Gregg 72 year old referred for MNT related to CKD    Time Spent: 30 minutes  Visit Type: phone  Pt accompanied by: self  Referring Physician: Namiat Perkins  N18.2 (ICD-10-CM) - CKD (chronic kidney disease) stage 2, GFR 60-89 ml/min  Additional Information:  patient with multiple dental surgeries losing weight    NUTRITION HISTORY  Factors affecting nutrition intake include: edentulous, difficulty chewing  Current diet/appetite: soft foods  She has a h/o GB surgery    Carly presents today with questions regarding food choices for ease of chewing that will give her good nutrition. She would also like to learn about what foods to consume/avoid with CKD. She tells me that over the past year her kidneys have been stressed due to NSAIDS.     She has lost over 15 lbs in the past month due to difficulty chewing.  Her weight has finally stablized, increase one pound in the past few days as she is finding more foods to eat.   She has a h/or gastric bypass surgery, thus, focuses on small, frequent meals.      Diet Recall  Breakfast Pears, oatmeal coffee w/ cream  Banana bread with butter   Lunch Scalloped potatoes and ham, brownie OR pea soup ham  Pears and jello, pudding   Dinner Chicken chow mein, white rice  Mac n cheese  Cheese ravioli   Snacks Canned fruit   Beverages Protein water, Plant based protein shake, water     ANTHROPOMETRICS  Height: 60\"  Weight: 122 lbs/55kg  BMI: 24  Weight Status:  Normal BMI  IBW: 100 lbs (122%)  Weight History: reported wt 108 lbs (2 weeks ago in clinic)  Wt Readings from Last 8 Encounters:   08/23/22 55.3 kg (122 lb)   06/15/22 55.8 kg (123 lb) "   04/27/22 56.7 kg (125 lb)   02/23/22 56.7 kg (125 lb)   09/03/21 58.4 kg (128 lb 12.8 oz)   08/11/21 60.1 kg (132 lb 8 oz)   05/28/21 60.1 kg (132 lb 6.4 oz)   11/16/20 60.3 kg (133 lb)     Dosing Weight: 55kg    Medications/vitamins/minerals/herbals:   Reviewed, Centrum Silver, B12, Iron     Labs:   Labs reviewed    NUTRITION FOCUSED PHYSICAL ASSESSMENT FOR DIAGNOSING MALNUTRITION:  Consult for education only    ASSESSED NUTRITION NEEDS:  Estimated Energy Needs: 1500 kcals (30 Kcal/Kg)  Justification: maintenance  Estimated Protein Needs: 44-55 grams protein (0.8-1 g pro/Kg)  Justification: maintenance and CKD  Estimated Fluid Needs: 1500  mL   Justification: maintenance    MALNUTRITION:  % Weight Loss:  > 5% in 1 month (severe malnutrition)  % Intake:  <75% for >/= 1 month (moderate malnutrition)  Subcutaneous Fat Loss:  None observed  Muscle Loss:  None observed  Fluid Retention:  None noted    Malnutrition Diagnosis: Moderate malnutrition  In Context of:  Acute illness or injury    NUTRITION DIAGNOSIS:  Inadequate oral intake related to edentulous as evidenced by 12 % wt loss x past one month due to difficulty chewing.     INTERVENTIONS  Provided written & verbal education:     - Reviewed nutrition and hydration needs. Advised pt to aim for at least 1500-1800kcal and 45-55g protein daily.  Advised pt to aim for 6 cups non-caffeine containing beverages (water/electrolytes) daily.  Reviewed protein goals for CKD and malnutrition status.  Encouraged to have protein at each meal  - Discussed strategies to help fortify meals and snacks via soft, easy to chew foods. Encouraged to focus on small, frequent meals w/ h/o stomach surgery.   - Encouraged to track her intake on MFP or using Calorie Solomon.  She also has a WW book that can help her track her nutrition.     Provided pt with corresponding education materials/handouts on:  High Calorie/High Protein Recipe booklet, Tips to Increase the Calories in Your Diet and  Sources of Protein    Pt verbalize understanding of materials provided during consult.   Patient Understanding: good  Expected patient engagement: good     Goals  1.  Aim for 5-6 small frequent meals  2.  Aim for 1500-1800kcal and 45-55g protein/day  3. Weight maintenance     Follow-Up Plans: Pt has RD contact information for questions.          Ade Hernandez RD, LD

## 2022-09-30 NOTE — PATIENT INSTRUCTIONS
Aguilar Carroll,     I have attached the resources that I referred to:  --Thrive series recipes  --Tips to increase calories in your diet  --Sources of protein    Here are your nutrition goals:  --Calories: 8460-6591 /day for increase of weight  --Protein: 45-55 grams/day  --Fluids: 6 cups water/electrolyte beverages/day    Here are the links to tracking your nutrition:    Engage  Take control of your goals. Track calories, break down ingredients, and log activities with [employer]ares.  www.myfitnesspal.com  ?  Food Nutrition Facts and Free Calorie Counter  CalorieKing  Food Nutrition Facts and Free Calorie Counter  CalorieKing  Find nutrition facts for your favorite brands and fast-food restaurants in our trusted food database. Track what you eat with our free online calorie counter and learn how to lose weight and keep it off.  www.Serveron  ?  Please reach out to me with any questions along the way!    Be well,     Ade Hernandez RD, LD  [facility]  Outpatient Services  [address] [place] [id number]  [email or web address]  [email or web address]  Office: [phone]  Fax: [phone]

## 2022-10-13 ENCOUNTER — HOSPITAL ENCOUNTER (OUTPATIENT)
Dept: PHYSICAL THERAPY | Facility: CLINIC | Age: 72
Setting detail: THERAPIES SERIES
Discharge: HOME | End: 2022-10-13
Attending: FAMILY MEDICINE
Payer: COMMERCIAL

## 2022-10-13 PROCEDURE — 97140 MANUAL THERAPY 1/> REGIONS: CPT | Mod: GP | Performed by: PHYSICAL THERAPIST

## 2022-10-26 ENCOUNTER — VIRTUAL VISIT (OUTPATIENT)
Dept: ONCOLOGY | Facility: CLINIC | Age: 72
End: 2022-10-26
Attending: FAMILY MEDICINE
Payer: COMMERCIAL

## 2022-10-26 DIAGNOSIS — N18.2 CKD (CHRONIC KIDNEY DISEASE) STAGE 2, GFR 60-89 ML/MIN: Primary | ICD-10-CM

## 2022-10-26 PROCEDURE — 97803 MED NUTRITION INDIV SUBSEQ: CPT | Mod: TEL,95 | Performed by: DIETITIAN, REGISTERED

## 2022-10-26 NOTE — PROGRESS NOTES
"The patient has been notified of the following:      \"We have found that certain health care needs can be provided without the need for a face to face visit.  This service lets us provide the care you need with a phone conversation.       I will have full access to your [place] medical record during this entire phone call.   I will be taking notes for your medical record.      Since this is like an office visit, we will bill your insurance company for this service.       There are potential benefits and risks of telephone visits (e.g. limits to patient confidentiality) that differ from in-person visits.?  Confidentiality still applies for telephone services, and nobody will record the visit.  It is important to be in a quiet, private space that is free of distractions (including cell phone or other devices) during the visit.??      If during the course of the call I believe a telephone visit is not appropriate, you will not be charged for this service\"     Consent has been obtained for this service by care team member: Yes     CLINICAL NUTRITION SERVICES - REASSESSMENT NOTE   EVALUATION OF PREVIOUS PLAN OF CARE:   Time Spent: 15 minutes  Visit Type: phone  Pt accompanied by: self  Referring Physician: Namita Perkins  N18.2 (ICD-10-CM) - CKD (chronic kidney disease) stage 2, GFR 60-89 ml/min  Additional Information:  patient with multiple dental surgeries losing weight    Monitoring from previous assessment:   -Food intake - Carly tells me that she has been eating a lot of calorie dense foods such as cream soups, beef tips with gravy, peanut butter, Ensure shakes etc.  She has been tracking her intake and estimates ~2200 calories/day. She really struggles with this calorie load and is frustrated that she has been only able to gain 4 lbs.  She doesn't think she can keep up on this calorie load.  She wonders if there is anything else going on where she would need so many calories just to gain weight.  She tells me " that she has never needed much more than 1500 calories/day to maintain her weight.  She plans to talk to her PCP about this.   She also complain of dry skin even after she has been focused on drinking more water and electrolytes.  -Fluid/beverage intake - She has drinking at least 8 cups water/gatorade.  -Weight trends - reported wt of 112 lbs which is up from 108 lbs over the past month  Wt Readings from Last 8 Encounters:   08/23/22 55.3 kg (122 lb)   06/15/22 55.8 kg (123 lb)   04/27/22 56.7 kg (125 lb)   02/23/22 56.7 kg (125 lb)   09/03/21 58.4 kg (128 lb 12.8 oz)   08/11/21 60.1 kg (132 lb 8 oz)   05/28/21 60.1 kg (132 lb 6.4 oz)   11/16/20 60.3 kg (133 lb)     Previous Goals:   1.  Aim for 5-6 small frequent meals  2.  Aim for 1500-1800kcal and 45-55g protein/day  3. Weight maintenance   Evaluation: Met   Previous Nutrition Diagnosis:   Inadequate oral intake related to edentulous as evidenced by 12 % wt loss x past one month due to difficulty chewing.   Evaluation: Improving with no further weight loss  NEW FINDINGS:   No new findings   CURRENT NUTRITION DIAGNOSIS   No nutrition diagnosis identified at this time   INTERVENTIONS   Composition of meals and snacks - encouraged to continue tracking to ensure adequacy for weight gain towards goal (120-125 lb).  Encouraged small, frequent meals.  Reviewed calorie goals for continued wt gain.    Medical Food Supplement - encouraged to try Boost VHC to get more calories in less volume due to fullness.    Goals  1.  Aim for 5-6 small frequent meals  2.  Aim for 1800kcal and 45-55g protein/day  3. Weight maintenance/weight gain towards her personal goal of 120 lbs     Follow-Up Plans: Pt has RD contact information for questions.       Ade Hernandez RD, , LD  [facility]  [phone]

## 2022-10-26 NOTE — LETTER
"    10/26/2022         RE: Carly Gregg  [address] [facility] [id number]        Dear Colleague,    Thank you for referring your patient, Carly Gregg, to the [facility]. Please see a copy of my visit note below.    The patient has been notified of the following:      \"We have found that certain health care needs can be provided without the need for a face to face visit.  This service lets us provide the care you need with a phone conversation.       I will have full access to your [place] medical record during this entire phone call.   I will be taking notes for your medical record.      Since this is like an office visit, we will bill your insurance company for this service.       There are potential benefits and risks of telephone visits (e.g. limits to patient confidentiality) that differ from in-person visits.?  Confidentiality still applies for telephone services, and nobody will record the visit.  It is important to be in a quiet, private space that is free of distractions (including cell phone or other devices) during the visit.??      If during the course of the call I believe a telephone visit is not appropriate, you will not be charged for this service\"     Consent has been obtained for this service by care team member: Yes     CLINICAL NUTRITION SERVICES - REASSESSMENT NOTE   EVALUATION OF PREVIOUS PLAN OF CARE:   Time Spent: 15 minutes  Visit Type: phone  Pt accompanied by: self  Referring Physician: Namita Perkins  N18.2 (ICD-10-CM) - CKD (chronic kidney disease) stage 2, GFR 60-89 ml/min  Additional Information:  patient with multiple dental surgeries losing weight    Monitoring from previous assessment:   -Food intake - Carly tells me that she has been eating a lot of calorie dense foods such as cream soups, beef tips with gravy, peanut butter, Ensure shakes etc.  She has been tracking her intake and estimates ~2200 calories/day. She " really struggles with this calorie load and is frustrated that she has been only able to gain 4 lbs.  She doesn't think she can keep up on this calorie load.  She wonders if there is anything else going on where she would need so many calories just to gain weight.  She tells me that she has never needed much more than 1500 calories/day to maintain her weight.  She plans to talk to her PCP about this.   She also complain of dry skin even after she has been focused on drinking more water and electrolytes.  -Fluid/beverage intake - She has drinking at least 8 cups water/gatorade.  -Weight trends - reported wt of 112 lbs which is up from 108 lbs over the past month  Wt Readings from Last 8 Encounters:   08/23/22 55.3 kg (122 lb)   06/15/22 55.8 kg (123 lb)   04/27/22 56.7 kg (125 lb)   02/23/22 56.7 kg (125 lb)   09/03/21 58.4 kg (128 lb 12.8 oz)   08/11/21 60.1 kg (132 lb 8 oz)   05/28/21 60.1 kg (132 lb 6.4 oz)   11/16/20 60.3 kg (133 lb)     Previous Goals:   1.  Aim for 5-6 small frequent meals  2.  Aim for 1500-1800kcal and 45-55g protein/day  3. Weight maintenance   Evaluation: Met   Previous Nutrition Diagnosis:   Inadequate oral intake related to edentulous as evidenced by 12 % wt loss x past one month due to difficulty chewing.   Evaluation: Improving with no further weight loss  NEW FINDINGS:   No new findings   CURRENT NUTRITION DIAGNOSIS   No nutrition diagnosis identified at this time   INTERVENTIONS   Composition of meals and snacks - encouraged to continue tracking to ensure adequacy for weight gain towards goal (120-125 lb).  Encouraged small, frequent meals.  Reviewed calorie goals for continued wt gain.    Medical Food Supplement - encouraged to try Boost VHC to get more calories in less volume due to fullness.    Goals  1.  Aim for 5-6 small frequent meals  2.  Aim for 1800kcal and 45-55g protein/day  3. Weight maintenance/weight gain towards her personal goal of 120 lbs     Follow-Up Plans: Pt has RD  contact information for questions.               Again, thank you for allowing me to participate in the care of your patient.      Sincerely,    Ade Hernandez RD

## 2022-11-08 ENCOUNTER — HOSPITAL ENCOUNTER (OUTPATIENT)
Dept: MAMMOGRAPHY | Facility: CLINIC | Age: 72
Discharge: HOME | End: 2022-11-08
Attending: FAMILY MEDICINE | Admitting: FAMILY MEDICINE
Payer: COMMERCIAL

## 2022-11-08 DIAGNOSIS — Z12.31 VISIT FOR SCREENING MAMMOGRAM: ICD-10-CM

## 2022-11-08 PROCEDURE — 77067 SCR MAMMO BI INCL CAD: CPT

## 2022-11-14 ENCOUNTER — TELEPHONE (OUTPATIENT)
Dept: FAMILY MEDICINE | Facility: CLINIC | Age: 72
End: 2022-11-14

## 2022-11-14 NOTE — TELEPHONE ENCOUNTER
Patient called asking to schedule her shingles vaccine.  Reviewed [facility], patient is current has shingrix vaccine 2 dose series 2018.  Patient verbalized understanding,no further questions.  Sharon Muhammad RN

## 2022-11-19 ENCOUNTER — HEALTH MAINTENANCE LETTER (OUTPATIENT)
Age: 72
End: 2022-11-19

## 2022-11-28 ENCOUNTER — TRANSFERRED RECORDS (OUTPATIENT)
Dept: HEALTH INFORMATION MANAGEMENT | Facility: CLINIC | Age: 72
End: 2022-11-28

## 2023-05-02 ENCOUNTER — TRANSFERRED RECORDS (OUTPATIENT)
Dept: HEALTH INFORMATION MANAGEMENT | Facility: CLINIC | Age: 73
End: 2023-05-02

## 2023-05-05 ENCOUNTER — MYC MEDICAL ADVICE (OUTPATIENT)
Dept: FAMILY MEDICINE | Facility: CLINIC | Age: 73
End: 2023-05-05
Payer: COMMERCIAL

## 2023-05-08 ENCOUNTER — TELEPHONE (OUTPATIENT)
Dept: FAMILY MEDICINE | Facility: CLINIC | Age: 73
End: 2023-05-08

## 2023-05-08 ENCOUNTER — MYC MEDICAL ADVICE (OUTPATIENT)
Dept: FAMILY MEDICINE | Facility: CLINIC | Age: 73
End: 2023-05-08

## 2023-05-08 ENCOUNTER — OFFICE VISIT (OUTPATIENT)
Dept: FAMILY MEDICINE | Facility: CLINIC | Age: 73
End: 2023-05-08
Payer: COMMERCIAL

## 2023-05-08 VITALS
WEIGHT: 122.6 LBS | RESPIRATION RATE: 16 BRPM | OXYGEN SATURATION: 99 % | BODY MASS INDEX: 24.72 KG/M2 | DIASTOLIC BLOOD PRESSURE: 64 MMHG | HEART RATE: 59 BPM | SYSTOLIC BLOOD PRESSURE: 102 MMHG | HEIGHT: 59 IN | TEMPERATURE: 97.5 F

## 2023-05-08 DIAGNOSIS — Z88.0 PENICILLIN ALLERGY: ICD-10-CM

## 2023-05-08 DIAGNOSIS — L29.9 PRURITIC DISORDER: Primary | ICD-10-CM

## 2023-05-08 DIAGNOSIS — Z98.84 S/P GASTRIC BYPASS: ICD-10-CM

## 2023-05-08 DIAGNOSIS — M79.2 THORACIC NEURALGIA: ICD-10-CM

## 2023-05-08 DIAGNOSIS — N18.2 CKD (CHRONIC KIDNEY DISEASE) STAGE 2, GFR 60-89 ML/MIN: ICD-10-CM

## 2023-05-08 DIAGNOSIS — F33.42 RECURRENT MAJOR DEPRESSION IN COMPLETE REMISSION (H): ICD-10-CM

## 2023-05-08 DIAGNOSIS — R79.89 ELEVATED LFTS: ICD-10-CM

## 2023-05-08 DIAGNOSIS — D51.9 ANEMIA DUE TO VITAMIN B12 DEFICIENCY, UNSPECIFIED B12 DEFICIENCY TYPE: ICD-10-CM

## 2023-05-08 DIAGNOSIS — Z13.220 SCREENING FOR LIPOID DISORDERS: ICD-10-CM

## 2023-05-08 DIAGNOSIS — D50.9 IRON DEFICIENCY ANEMIA, UNSPECIFIED IRON DEFICIENCY ANEMIA TYPE: ICD-10-CM

## 2023-05-08 DIAGNOSIS — K91.2 POSTSURGICAL MALABSORPTION, NOT ELSEWHERE CLASSIFIED: ICD-10-CM

## 2023-05-08 PROBLEM — M27.9: Status: ACTIVE | Noted: 2022-04-06

## 2023-05-08 PROBLEM — K08.409 PARTIAL EDENTULISM: Status: ACTIVE | Noted: 2022-04-06

## 2023-05-08 LAB
ALBUMIN SERPL BCG-MCNC: 3.8 G/DL (ref 3.5–5.2)
ALP SERPL-CCNC: 126 U/L (ref 35–104)
ALT SERPL W P-5'-P-CCNC: 37 U/L (ref 10–35)
ANION GAP SERPL CALCULATED.3IONS-SCNC: 7 MMOL/L (ref 7–15)
AST SERPL W P-5'-P-CCNC: 46 U/L (ref 10–35)
BASOPHILS # BLD AUTO: 0 10E3/UL (ref 0–0.2)
BASOPHILS NFR BLD AUTO: 1 %
BILIRUB SERPL-MCNC: 0.3 MG/DL
BUN SERPL-MCNC: 12.7 MG/DL (ref 8–23)
CALCIUM SERPL-MCNC: 9.5 MG/DL (ref 8.8–10.2)
CHLORIDE SERPL-SCNC: 105 MMOL/L (ref 98–107)
CHOLEST SERPL-MCNC: 171 MG/DL
CREAT SERPL-MCNC: 0.59 MG/DL (ref 0.51–0.95)
DEPRECATED HCO3 PLAS-SCNC: 27 MMOL/L (ref 22–29)
EOSINOPHIL # BLD AUTO: 0.2 10E3/UL (ref 0–0.7)
EOSINOPHIL NFR BLD AUTO: 4 %
ERYTHROCYTE [DISTWIDTH] IN BLOOD BY AUTOMATED COUNT: 21.4 % (ref 10–15)
FERRITIN SERPL-MCNC: 12 NG/ML (ref 11–328)
GFR SERPL CREATININE-BSD FRML MDRD: >90 ML/MIN/1.73M2
GLUCOSE SERPL-MCNC: 87 MG/DL (ref 70–99)
HCT VFR BLD AUTO: 25.7 % (ref 35–47)
HDLC SERPL-MCNC: 66 MG/DL
HGB BLD-MCNC: 7.5 G/DL (ref 11.7–15.7)
IMM GRANULOCYTES # BLD: 0 10E3/UL
IMM GRANULOCYTES NFR BLD: 0 %
LDLC SERPL CALC-MCNC: 83 MG/DL
LYMPHOCYTES # BLD AUTO: 1.3 10E3/UL (ref 0.8–5.3)
LYMPHOCYTES NFR BLD AUTO: 35 %
MCH RBC QN AUTO: 21.8 PG (ref 26.5–33)
MCHC RBC AUTO-ENTMCNC: 29.2 G/DL (ref 31.5–36.5)
MCV RBC AUTO: 75 FL (ref 78–100)
MONOCYTES # BLD AUTO: 0.4 10E3/UL (ref 0–1.3)
MONOCYTES NFR BLD AUTO: 10 %
NEUTROPHILS # BLD AUTO: 1.9 10E3/UL (ref 1.6–8.3)
NEUTROPHILS NFR BLD AUTO: 51 %
NONHDLC SERPL-MCNC: 105 MG/DL
PLATELET # BLD AUTO: 417 10E3/UL (ref 150–450)
POTASSIUM SERPL-SCNC: 4 MMOL/L (ref 3.4–5.3)
PROT SERPL-MCNC: 6.6 G/DL (ref 6.4–8.3)
RBC # BLD AUTO: 3.44 10E6/UL (ref 3.8–5.2)
SODIUM SERPL-SCNC: 139 MMOL/L (ref 136–145)
T4 FREE SERPL-MCNC: 1.01 NG/DL (ref 0.9–1.7)
TRIGL SERPL-MCNC: 108 MG/DL
TSH SERPL DL<=0.005 MIU/L-ACNC: 1.5 UIU/ML (ref 0.3–4.2)
WBC # BLD AUTO: 3.7 10E3/UL (ref 4–11)

## 2023-05-08 PROCEDURE — 84443 ASSAY THYROID STIM HORMONE: CPT | Performed by: PHYSICIAN ASSISTANT

## 2023-05-08 PROCEDURE — 80053 COMPREHEN METABOLIC PANEL: CPT | Performed by: PHYSICIAN ASSISTANT

## 2023-05-08 PROCEDURE — 36415 COLL VENOUS BLD VENIPUNCTURE: CPT | Performed by: PHYSICIAN ASSISTANT

## 2023-05-08 PROCEDURE — 80061 LIPID PANEL: CPT | Performed by: PHYSICIAN ASSISTANT

## 2023-05-08 PROCEDURE — 84439 ASSAY OF FREE THYROXINE: CPT | Performed by: PHYSICIAN ASSISTANT

## 2023-05-08 PROCEDURE — 86038 ANTINUCLEAR ANTIBODIES: CPT | Performed by: PHYSICIAN ASSISTANT

## 2023-05-08 PROCEDURE — 82728 ASSAY OF FERRITIN: CPT | Performed by: PHYSICIAN ASSISTANT

## 2023-05-08 PROCEDURE — 82607 VITAMIN B-12: CPT | Performed by: PHYSICIAN ASSISTANT

## 2023-05-08 PROCEDURE — 82306 VITAMIN D 25 HYDROXY: CPT | Performed by: PHYSICIAN ASSISTANT

## 2023-05-08 PROCEDURE — 99215 OFFICE O/P EST HI 40 MIN: CPT | Performed by: PHYSICIAN ASSISTANT

## 2023-05-08 PROCEDURE — 85025 COMPLETE CBC W/AUTO DIFF WBC: CPT | Performed by: PHYSICIAN ASSISTANT

## 2023-05-08 RX ORDER — GABAPENTIN 300 MG/1
300 CAPSULE ORAL 3 TIMES DAILY
Qty: 90 CAPSULE | Refills: 0 | Status: SHIPPED | OUTPATIENT
Start: 2023-05-08 | End: 2023-06-07 | Stop reason: DRUGHIGH

## 2023-05-08 RX ORDER — LORATADINE 10 MG/1
10 TABLET ORAL DAILY
COMMUNITY
End: 2023-06-07

## 2023-05-08 RX ORDER — MULTIVIT WITH MINERALS/LUTEIN
1 TABLET ORAL DAILY
COMMUNITY
Start: 2023-05-08

## 2023-05-08 NOTE — PROGRESS NOTES
Assessment & Plan     ASSESSMENT/PLAN:      ICD-10-CM    1. Pruritic disorder  L29.9 Anti Nuclear Ivania IgG by IFA with Reflex     CBC with platelets and differential     Comprehensive metabolic panel (BMP + Alb, Alk Phos, ALT, AST, Total. Bili, TP)     Ferritin     Adult Allergy/Asthma Referral     TSH     T4, free     Anti Nuclear Ivania IgG by IFA with Reflex     CBC with platelets and differential     Comprehensive metabolic panel (BMP + Alb, Alk Phos, ALT, AST, Total. Bili, TP)     Ferritin     TSH     T4, free      2. Penicillin allergy  Z88.0 Adult Allergy/Asthma Referral      3. Anemia due to vitamin B12 deficiency, unspecified B12 deficiency type  D51.9 Vitamin B12     Vitamin B12      4. S/P gastric bypass  Z98.84 CBC with platelets and differential     Comprehensive metabolic panel (BMP + Alb, Alk Phos, ALT, AST, Total. Bili, TP)     Vitamin B12     Vitamin D Deficiency     Ferritin     CBC with platelets and differential     Comprehensive metabolic panel (BMP + Alb, Alk Phos, ALT, AST, Total. Bili, TP)     Vitamin B12     Vitamin D Deficiency     Ferritin      5. Screening for lipoid disorders  Z13.220 Lipid panel reflex to direct LDL Non-fasting     Lipid panel reflex to direct LDL Non-fasting      6. CKD (chronic kidney disease) stage 2, GFR 60-89 ml/min  N18.2 Comprehensive metabolic panel (BMP + Alb, Alk Phos, ALT, AST, Total. Bili, TP)     Comprehensive metabolic panel (BMP + Alb, Alk Phos, ALT, AST, Total. Bili, TP)      7. Thoracic neuralgia  M79.2 gabapentin (NEURONTIN) 300 MG capsule      8. Recurrent major depression in complete remission (H)  F33.42       9. Postsurgical malabsorption, not elsewhere classified  K91.2 Vitamin D Deficiency     Vitamin D Deficiency      10. Iron deficiency anemia, unspecified iron deficiency anemia type  D50.9 CBC with platelets and differential        Has follow up scheduled - annual exam with PCP next month  She also plans to follow up with dermatology after  labwork is back. She would like labs faxed to [facility], lab notified.    Anemic worse than baseline with hgb 7.5. left a voicemail for patient and sent scroll kit message, to restart iron supplement and recheck CBC next week.    Symptoms sound like pressure/heat induced urticaria however without the urticaria. With this and possible penicillin allergy recommended allergy referral. Will check labs as recommended by dermatology.  Note: lupus anticoagulant panel not covered by medicare so discussed with patient and deferred. Will start with TAMIKA    Patient would like to restart gabapentin. She was taking 800 mg TID, will taper up.  After visit I sent a message to patient with more detailed instructions.    Depression: stable on zoloft for many years    Patient Instructions   Schedule consult with allergist    Stop claritin.  Start cetirizine (zyrtec) OTC once daily, if needed twice daily    We will let you know about labwork    Restart gabapentin: 300 mg at night for 3 nights then 300 mg two times daily for 3 days then 300 mg three times daily for 3 days  Then increase to 600 mg as above  Then increase to 800 mg as above      55 minutes spent by me on the date of the encounter doing chart review, history and exam, documentation and further activities per the note    Radha Nance PA-C  [facility] GEORGE Carroll is a 72 year old, presenting for the following health issues:  Derm Problem      History of Present Illness       Reason for visit:  Need blood tests for my dermatologist. Also an allergy test for penicillin.She consumes 0 sweetened beverage(s) daily.She exercises with enough effort to increase her heart rate 30 to 60 minutes per day.    She is taking medications regularly.    Today's PHQ-9         PHQ-9 Total Score: 2    PHQ-9 Q9 Thoughts of better off dead/self-harm past 2 weeks :   Not at all    How difficult have these problems made it for you to do your work, take  "care of things at home, or get along with other people: Not difficult at all     *  Was told that she should get tested to see if she is truly allergic to amoxicillin, how does she go about doing that      Dermatologist didn't know the cause of the itch  She recommended triamcinolone and claritin. Hasn't used the triamcinolone yet.  Taking claritin once daily the past week relieves the severity but doesn't resolve the itch  Pelvis and up all over body  No rash seen  Worse with heat and pressure  No history of eczema, allergies, asthma. No personal history of autoimmune  On zoloft many years  Stopped gabapentin as recommended by dentist as they were giving her other medications but she stayed off it. Itching started before discontinuing but has been getting progressively worse  2 days ago started taking tizanidine again    She takes centrum silver multi  Not taking other iron/B12    Review of Systems   Other than noted above, general, HEENT, respiratory, cardiac, MS, and gastrointestinal systems are negative.       Objective    /64   Pulse 59   Temp 97.5  F (36.4  C) (Tympanic)   Resp 16   Ht 1.511 m (4' 11.49\")   Wt 55.6 kg (122 lb 9.6 oz)   SpO2 99%   BMI 24.36 kg/m    Body mass index is 24.36 kg/m .  Physical Exam   GENERAL APPEARANCE: healthy, alert and no distress  SKIN: no suspicious lesions or rashes            "

## 2023-05-08 NOTE — TELEPHONE ENCOUNTER
Patient called back. Gave her the dosage to look for but also patient needs to know if she should take it once a day or how often she should take it. OK to Mychart her the information.   Aminah William RN on 5/8/2023 at 4:52 PM

## 2023-05-08 NOTE — TELEPHONE ENCOUNTER
Radha Nance:      Patient calls the clinic back, did read the message below, patient would like you to please advise the dose of the Iron supplement you want her to take, she is at the store now.        Hi Carly, I left you a voicemail, please call [phone] option 2 with questions or concerns.  You are anemic more so than your baseline.  Please restart your iron supplement daily. Recheck CBC labwork in 1 week (you can schdule a lab only visit).  If you have symptoms of anemia such as shortness of breath, lightheadedness, or signs of bleeding please be seen urgently.  Mercy Nance PA-C          Please advise.      MARGI Tellez

## 2023-05-08 NOTE — PATIENT INSTRUCTIONS
Schedule consult with allergist    Stop claritin.  Start cetirizine (zyrtec) OTC once daily, if needed twice daily    We will let you know about labwork    Restart gabapentin: 300 mg at night for 3 nights then 300 mg two times daily for 3 days then 300 mg three times daily for 3 days  Then increase to 600 mg as above  Then increase to 800 mg as above

## 2023-05-08 NOTE — TELEPHONE ENCOUNTER
Left message on home as the cell phone did not have a voicemail set up. Left message for patient to call back.    MARGI Tellez

## 2023-05-09 ENCOUNTER — MYC MEDICAL ADVICE (OUTPATIENT)
Dept: FAMILY MEDICINE | Facility: CLINIC | Age: 73
End: 2023-05-09
Payer: COMMERCIAL

## 2023-05-09 DIAGNOSIS — L29.9 PRURITIC DISORDER: Primary | ICD-10-CM

## 2023-05-09 LAB
ANA SER QL IF: NEGATIVE
DEPRECATED CALCIDIOL+CALCIFEROL SERPL-MC: 51 UG/L (ref 20–75)
VIT B12 SERPL-MCNC: 636 PG/ML (ref 232–1245)

## 2023-05-12 ENCOUNTER — HOSPITAL ENCOUNTER (OUTPATIENT)
Dept: ULTRASOUND IMAGING | Facility: CLINIC | Age: 73
Discharge: HOME | End: 2023-05-12
Attending: PHYSICIAN ASSISTANT | Admitting: PHYSICIAN ASSISTANT
Payer: COMMERCIAL

## 2023-05-12 DIAGNOSIS — L29.9 PRURITIC DISORDER: ICD-10-CM

## 2023-05-12 DIAGNOSIS — R79.89 ELEVATED LFTS: ICD-10-CM

## 2023-05-12 PROCEDURE — 76705 ECHO EXAM OF ABDOMEN: CPT

## 2023-05-16 ENCOUNTER — LAB (OUTPATIENT)
Dept: LAB | Facility: CLINIC | Age: 73
End: 2023-05-16
Payer: COMMERCIAL

## 2023-05-16 ENCOUNTER — TELEPHONE (OUTPATIENT)
Dept: FAMILY MEDICINE | Facility: CLINIC | Age: 73
End: 2023-05-16

## 2023-05-16 DIAGNOSIS — R74.8 ABNORMAL LEVELS OF OTHER SERUM ENZYMES: ICD-10-CM

## 2023-05-16 DIAGNOSIS — Z11.59 ENCOUNTER FOR SCREENING FOR OTHER VIRAL DISEASES: ICD-10-CM

## 2023-05-16 DIAGNOSIS — L29.9 PRURITIC DISORDER: ICD-10-CM

## 2023-05-16 DIAGNOSIS — D50.9 IRON DEFICIENCY ANEMIA, UNSPECIFIED IRON DEFICIENCY ANEMIA TYPE: ICD-10-CM

## 2023-05-16 DIAGNOSIS — R79.89 ELEVATED LFTS: ICD-10-CM

## 2023-05-16 LAB
ALBUMIN SERPL BCG-MCNC: 3.8 G/DL (ref 3.5–5.2)
ALP SERPL-CCNC: 128 U/L (ref 35–104)
ALT SERPL W P-5'-P-CCNC: 60 U/L (ref 10–35)
AST SERPL W P-5'-P-CCNC: 55 U/L (ref 10–35)
BASOPHILS # BLD AUTO: 0 10E3/UL (ref 0–0.2)
BASOPHILS NFR BLD AUTO: 1 %
BILIRUB DIRECT SERPL-MCNC: <0.2 MG/DL (ref 0–0.3)
BILIRUB SERPL-MCNC: 0.4 MG/DL
EOSINOPHIL # BLD AUTO: 0.3 10E3/UL (ref 0–0.7)
EOSINOPHIL NFR BLD AUTO: 5 %
ERYTHROCYTE [DISTWIDTH] IN BLOOD BY AUTOMATED COUNT: 22.5 % (ref 10–15)
HCT VFR BLD AUTO: 27.2 % (ref 35–47)
HGB BLD-MCNC: 8 G/DL (ref 11.7–15.7)
IMM GRANULOCYTES # BLD: 0 10E3/UL
IMM GRANULOCYTES NFR BLD: 0 %
LYMPHOCYTES # BLD AUTO: 1.4 10E3/UL (ref 0.8–5.3)
LYMPHOCYTES NFR BLD AUTO: 29 %
MCH RBC QN AUTO: 22.7 PG (ref 26.5–33)
MCHC RBC AUTO-ENTMCNC: 29.4 G/DL (ref 31.5–36.5)
MCV RBC AUTO: 77 FL (ref 78–100)
MONOCYTES # BLD AUTO: 0.4 10E3/UL (ref 0–1.3)
MONOCYTES NFR BLD AUTO: 9 %
NEUTROPHILS # BLD AUTO: 2.8 10E3/UL (ref 1.6–8.3)
NEUTROPHILS NFR BLD AUTO: 56 %
NRBC # BLD AUTO: 0 10E3/UL
NRBC BLD AUTO-RTO: 0 /100
PLATELET # BLD AUTO: 481 10E3/UL (ref 150–450)
PROT SERPL-MCNC: 6.7 G/DL (ref 6.4–8.3)
RBC # BLD AUTO: 3.52 10E6/UL (ref 3.8–5.2)
WBC # BLD AUTO: 5 10E3/UL (ref 4–11)

## 2023-05-16 PROCEDURE — 86706 HEP B SURFACE ANTIBODY: CPT

## 2023-05-16 PROCEDURE — 80076 HEPATIC FUNCTION PANEL: CPT

## 2023-05-16 PROCEDURE — 86803 HEPATITIS C AB TEST: CPT

## 2023-05-16 PROCEDURE — 36415 COLL VENOUS BLD VENIPUNCTURE: CPT

## 2023-05-16 PROCEDURE — 87340 HEPATITIS B SURFACE AG IA: CPT

## 2023-05-16 PROCEDURE — 86704 HEP B CORE ANTIBODY TOTAL: CPT

## 2023-05-16 PROCEDURE — 85025 COMPLETE CBC W/AUTO DIFF WBC: CPT

## 2023-05-16 PROCEDURE — 83540 ASSAY OF IRON: CPT

## 2023-05-16 PROCEDURE — 82977 ASSAY OF GGT: CPT

## 2023-05-16 PROCEDURE — 83550 IRON BINDING TEST: CPT

## 2023-05-16 NOTE — TELEPHONE ENCOUNTER
Noted. Called patient and reviewed recommendations.   Patient stated the iron can be hard on her stomach. RN advised that she reach out to the pharmacist to see if they have recommendations.  Aminah William RN on 5/16/2023 at 1:42 PM

## 2023-05-16 NOTE — TELEPHONE ENCOUNTER
Please let the patient know that the hgb recheck today looks stable. Continue on the iron and the plan per Mercy    I will leave this for Mercy to review as well.     DOMO Montanez MD

## 2023-05-16 NOTE — TELEPHONE ENCOUNTER
Lab brought this RN a critical lab value. Hgb 7.9.  RN reviewed chart last Hgb on 5/8/2023 was 7.5.   RN tried to find a provider to relay results to x2 but there were no providers available. Will route encounter to Dr. Montanez and follow up in person.  Aminah William RN on 5/16/2023 at 11:08 AM

## 2023-05-17 DIAGNOSIS — D50.9 IRON DEFICIENCY ANEMIA, UNSPECIFIED IRON DEFICIENCY ANEMIA TYPE: ICD-10-CM

## 2023-05-17 DIAGNOSIS — Z11.59 ENCOUNTER FOR SCREENING FOR OTHER VIRAL DISEASES: ICD-10-CM

## 2023-05-17 DIAGNOSIS — R74.8 ABNORMAL LEVELS OF OTHER SERUM ENZYMES: ICD-10-CM

## 2023-05-17 DIAGNOSIS — R79.89 ELEVATED LFTS: Primary | ICD-10-CM

## 2023-05-17 LAB
GGT SERPL-CCNC: 21 U/L (ref 5–36)
IRON BINDING CAPACITY (ROCHE): 513 UG/DL (ref 240–430)
IRON SATN MFR SERPL: 63 % (ref 15–46)
IRON SERPL-MCNC: 324 UG/DL (ref 37–145)

## 2023-05-18 LAB
HBV CORE AB SERPL QL IA: NONREACTIVE
HBV SURFACE AB SERPL IA-ACNC: 0.18 M[IU]/ML
HBV SURFACE AB SERPL IA-ACNC: NONREACTIVE M[IU]/ML
HBV SURFACE AG SERPL QL IA: NONREACTIVE
HCV AB SERPL QL IA: NONREACTIVE

## 2023-05-24 NOTE — TELEPHONE ENCOUNTER
[place] care team/Radha Nance:    Per chart review, do not see that the patient was told dosage or how often to take the Iron. Please advise.      MARGI Tellez

## 2023-05-24 NOTE — TELEPHONE ENCOUNTER
Patient had mycharted me that a nurse called her to tell her this information so I didn't write back.  Iron ferrous sulfate daily 325 (65 elemental) standard OTC dosing.  Mercy Nance PA-C

## 2023-05-24 NOTE — TELEPHONE ENCOUNTER
Call placed to patient   Relayed Bryce's message    Patient states she got the message and has correct OTC dosing of Iron and has been taking as directed  No further questions/concerns    Marvin Patiño RN

## 2023-06-01 ENCOUNTER — LAB (OUTPATIENT)
Dept: LAB | Facility: CLINIC | Age: 73
End: 2023-06-01
Payer: COMMERCIAL

## 2023-06-01 DIAGNOSIS — D50.9 IRON DEFICIENCY ANEMIA, UNSPECIFIED IRON DEFICIENCY ANEMIA TYPE: ICD-10-CM

## 2023-06-01 DIAGNOSIS — R79.89 ELEVATED LFTS: ICD-10-CM

## 2023-06-01 LAB
ALBUMIN SERPL BCG-MCNC: 3.7 G/DL (ref 3.5–5.2)
ALP SERPL-CCNC: 118 U/L (ref 35–104)
ALT SERPL W P-5'-P-CCNC: 54 U/L (ref 10–35)
AST SERPL W P-5'-P-CCNC: 45 U/L (ref 10–35)
BASOPHILS # BLD AUTO: 0.1 10E3/UL (ref 0–0.2)
BASOPHILS NFR BLD AUTO: 2 %
BILIRUB DIRECT SERPL-MCNC: <0.2 MG/DL (ref 0–0.3)
BILIRUB SERPL-MCNC: 0.4 MG/DL
EOSINOPHIL # BLD AUTO: 0.4 10E3/UL (ref 0–0.7)
EOSINOPHIL NFR BLD AUTO: 10 %
ERYTHROCYTE [DISTWIDTH] IN BLOOD BY AUTOMATED COUNT: 29.2 % (ref 10–15)
HCT VFR BLD AUTO: 30.2 % (ref 35–47)
HGB BLD-MCNC: 8.7 G/DL (ref 11.7–15.7)
IMM GRANULOCYTES # BLD: 0 10E3/UL
IMM GRANULOCYTES NFR BLD: 0 %
LYMPHOCYTES # BLD AUTO: 1.4 10E3/UL (ref 0.8–5.3)
LYMPHOCYTES NFR BLD AUTO: 37 %
MCH RBC QN AUTO: 24.4 PG (ref 26.5–33)
MCHC RBC AUTO-ENTMCNC: 28.8 G/DL (ref 31.5–36.5)
MCV RBC AUTO: 85 FL (ref 78–100)
MONOCYTES # BLD AUTO: 0.5 10E3/UL (ref 0–1.3)
MONOCYTES NFR BLD AUTO: 13 %
NEUTROPHILS # BLD AUTO: 1.5 10E3/UL (ref 1.6–8.3)
NEUTROPHILS NFR BLD AUTO: 38 %
NRBC # BLD AUTO: 0 10E3/UL
NRBC BLD AUTO-RTO: 0 /100
PLATELET # BLD AUTO: 408 10E3/UL (ref 150–450)
PROT SERPL-MCNC: 6.4 G/DL (ref 6.4–8.3)
RBC # BLD AUTO: 3.57 10E6/UL (ref 3.8–5.2)
WBC # BLD AUTO: 3.8 10E3/UL (ref 4–11)

## 2023-06-01 PROCEDURE — 36415 COLL VENOUS BLD VENIPUNCTURE: CPT

## 2023-06-01 PROCEDURE — 85025 COMPLETE CBC W/AUTO DIFF WBC: CPT

## 2023-06-01 PROCEDURE — 80076 HEPATIC FUNCTION PANEL: CPT

## 2023-06-06 ENCOUNTER — TRANSFERRED RECORDS (OUTPATIENT)
Dept: HEALTH INFORMATION MANAGEMENT | Facility: CLINIC | Age: 73
End: 2023-06-06
Payer: COMMERCIAL

## 2023-06-07 ENCOUNTER — OFFICE VISIT (OUTPATIENT)
Dept: FAMILY MEDICINE | Facility: CLINIC | Age: 73
End: 2023-06-07
Payer: COMMERCIAL

## 2023-06-07 VITALS
OXYGEN SATURATION: 100 % | DIASTOLIC BLOOD PRESSURE: 70 MMHG | HEART RATE: 60 BPM | BODY MASS INDEX: 24.74 KG/M2 | HEIGHT: 60 IN | TEMPERATURE: 97.7 F | SYSTOLIC BLOOD PRESSURE: 136 MMHG | WEIGHT: 126 LBS

## 2023-06-07 DIAGNOSIS — E55.9 VITAMIN D DEFICIENCY, UNSPECIFIED: ICD-10-CM

## 2023-06-07 DIAGNOSIS — D50.8 OTHER IRON DEFICIENCY ANEMIA: Primary | ICD-10-CM

## 2023-06-07 DIAGNOSIS — Z98.84 S/P GASTRIC BYPASS: ICD-10-CM

## 2023-06-07 DIAGNOSIS — K90.9 INTESTINAL MALABSORPTION, UNSPECIFIED TYPE: ICD-10-CM

## 2023-06-07 DIAGNOSIS — M79.2 THORACIC NEURALGIA: ICD-10-CM

## 2023-06-07 DIAGNOSIS — M54.6 CHRONIC RIGHT-SIDED THORACIC BACK PAIN: ICD-10-CM

## 2023-06-07 DIAGNOSIS — G89.29 CHRONIC RIGHT-SIDED THORACIC BACK PAIN: ICD-10-CM

## 2023-06-07 DIAGNOSIS — F33.42 RECURRENT MAJOR DEPRESSION IN COMPLETE REMISSION (H): ICD-10-CM

## 2023-06-07 PROCEDURE — 99214 OFFICE O/P EST MOD 30 MIN: CPT | Performed by: FAMILY MEDICINE

## 2023-06-07 RX ORDER — GABAPENTIN 300 MG/1
300 CAPSULE ORAL 3 TIMES DAILY
Qty: 90 CAPSULE | Refills: 0 | Status: CANCELLED | OUTPATIENT
Start: 2023-06-07

## 2023-06-07 RX ORDER — TIZANIDINE 2 MG/1
2 TABLET ORAL 3 TIMES DAILY PRN
Qty: 270 TABLET | Refills: 3 | Status: SHIPPED | OUTPATIENT
Start: 2023-06-07 | End: 2024-07-09

## 2023-06-07 RX ORDER — GABAPENTIN 400 MG/1
800 CAPSULE ORAL 3 TIMES DAILY
Qty: 540 CAPSULE | Refills: 3 | Status: SHIPPED | OUTPATIENT
Start: 2023-06-07 | End: 2024-01-29

## 2023-06-07 RX ORDER — PNV NO.95/FERROUS FUM/FOLIC AC 28MG-0.8MG
1 TABLET ORAL DAILY
COMMUNITY

## 2023-06-07 NOTE — PROGRESS NOTES
"  Assessment & Plan     Thoracic neuralgia  Cont   - gabapentin (NEURONTIN) 400 MG capsule; Take 2 capsules (800 mg) by mouth 3 times daily    Chronic right-sided thoracic back pain  Helping   - tiZANidine (ZANAFLEX) 2 MG tablet; Take 1 tablet (2 mg) by mouth 3 times daily as needed for muscle spasms  - gabapentin (NEURONTIN) 400 MG capsule; Take 2 capsules (800 mg) by mouth 3 times daily    Recurrent major depression in complete remission (H)  Stable   - sertraline (ZOLOFT) 50 MG tablet; Take 1 tablet daily    Other iron deficiency anemia  Recheck No results found for any visits on 06/07/23.    - **CBC with platelets differential FUTURE 2mo; Future  - Ferritin; Future  - Folate RBC; Future    S/P gastric bypass  Will check   - Ferritin; Future  - Folate RBC; Future  - Parathyroid Hormone Intact; Future  - Vitamin B1 (Thiamine); Future  - Vitamin D; Future  - Zinc; Future    Vitamin D deficiency, unspecified    - Vitamin D; Future    Intestinal malabsorption, unspecified type  As above   - Zinc; Future       BMI:   Estimated body mass index is 25.02 kg/m  as calculated from the following:    Height as of this encounter: 1.511 m (4' 11.5\").    Weight as of this encounter: 57.2 kg (126 lb).       FUTURE LABS:       - Schedule a fasting blood draw     Namita Perkins MD  [facility] GEORGE Carroll is a 72 year old, presenting for the following health issues:  Recheck Medication and Referral      History of Present Illness       Back Pain:  She presents for follow up of back pain. Patient's back pain is a chronic problem.  Location of back pain:  Right lower back, right middle of back, right side of neck, right shoulder and right side of waist  Description of back pain: other  Back pain spreads: nowhere    Since patient first noticed back pain, pain is: gradually improving  Does back pain interfere with her job:  Not applicable      Reason for visit:  Discuss my anemia, and what I should " "continue taking and refill my meds for my back pain.    She eats 0-1 servings of fruits and vegetables daily.She consumes 0 sweetened beverage(s) daily.She exercises with enough effort to increase her heart rate 20 to 29 minutes per day.  She exercises with enough effort to increase her heart rate 3 or less days per week.   She is taking medications regularly.       Depression Followup    How are you doing with your depression since your last visit? No change    Are you having other symptoms that might be associated with depression? No    Have you had a significant life event?  No     Are you feeling anxious or having panic attacks?   No    Do you have any concerns with your use of alcohol or other drugs? No    Social History     Tobacco Use     Smoking status: Never     Smokeless tobacco: Never   Vaping Use     Vaping status: Never Used   Substance Use Topics     Alcohol use: Not Currently     Comment: Maybe a couple glasses of wine on special Holidays.     Drug use: No         2/24/2022    10:36 AM 8/23/2022    10:43 AM 5/8/2023     1:06 PM   PHQ   PHQ-9 Total Score 4 2 2   Q9: Thoughts of better off dead/self-harm past 2 weeks Not at all Not at all Not at all         12/14/2018     8:31 AM 7/29/2019     7:56 AM 2/24/2022    10:36 AM   CATALINO-7 SCORE   Total Score 0 1 2           Mood is ok      She is still having trouble with the iron after the bariatric suergery has needed infusions in the past         Review of Systems   Constitutional, HEENT, cardiovascular, pulmonary, gi and gu systems are negative, except as otherwise noted.      Objective    /70 (BP Location: Right arm, Patient Position: Sitting, Cuff Size: Adult Regular)   Pulse 60   Temp 97.7  F (36.5  C) (Tympanic)   Ht 1.511 m (4' 11.5\")   Wt 57.2 kg (126 lb)   SpO2 100%   BMI 25.02 kg/m    Body mass index is 25.02 kg/m .  Physical Exam   GENERAL: healthy, alert and no distress  NECK: no adenopathy, no asymmetry, masses, or scars and thyroid " normal to palpation  RESP: lungs clear to auscultation - no rales, rhonchi or wheezes  CV: regular rate and rhythm, normal S1 S2, no S3 or S4, no murmur, click or rub, no peripheral edema and peripheral pulses strong    Lab on 06/01/2023   Component Date Value Ref Range Status     Protein Total 06/01/2023 6.4  6.4 - 8.3 g/dL Final     Albumin 06/01/2023 3.7  3.5 - 5.2 g/dL Final     Bilirubin Total 06/01/2023 0.4  <=1.2 mg/dL Final     Alkaline Phosphatase 06/01/2023 118 (H)  35 - 104 U/L Final     AST 06/01/2023 45 (H)  10 - 35 U/L Final     ALT 06/01/2023 54 (H)  10 - 35 U/L Final     Bilirubin Direct 06/01/2023 <0.20  0.00 - 0.30 mg/dL Final     WBC Count 06/01/2023 3.8 (L)  4.0 - 11.0 10e3/uL Final     RBC Count 06/01/2023 3.57 (L)  3.80 - 5.20 10e6/uL Final     Hemoglobin 06/01/2023 8.7 (L)  11.7 - 15.7 g/dL Final     Hematocrit 06/01/2023 30.2 (L)  35.0 - 47.0 % Final     MCV 06/01/2023 85  78 - 100 fL Final     MCH 06/01/2023 24.4 (L)  26.5 - 33.0 pg Final     MCHC 06/01/2023 28.8 (L)  31.5 - 36.5 g/dL Final     RDW 06/01/2023 29.2 (H)  10.0 - 15.0 % Final     Platelet Count 06/01/2023 408  150 - 450 10e3/uL Final     % Neutrophils 06/01/2023 38  % Final     % Lymphocytes 06/01/2023 37  % Final     % Monocytes 06/01/2023 13  % Final     % Eosinophils 06/01/2023 10  % Final     % Basophils 06/01/2023 2  % Final     % Immature Granulocytes 06/01/2023 0  % Final     NRBCs per 100 WBC 06/01/2023 0  <1 /100 Final     Absolute Neutrophils 06/01/2023 1.5 (L)  1.6 - 8.3 10e3/uL Final     Absolute Lymphocytes 06/01/2023 1.4  0.8 - 5.3 10e3/uL Final     Absolute Monocytes 06/01/2023 0.5  0.0 - 1.3 10e3/uL Final     Absolute Eosinophils 06/01/2023 0.4  0.0 - 0.7 10e3/uL Final     Absolute Basophils 06/01/2023 0.1  0.0 - 0.2 10e3/uL Final     Absolute Immature Granulocytes 06/01/2023 0.0  <=0.4 10e3/uL Final     Absolute NRBCs 06/01/2023 0.0  10e3/uL Final       Namita Perkins M.D.

## 2023-06-26 ASSESSMENT — ENCOUNTER SYMPTOMS
DIZZINESS: 0
HEMATURIA: 0
HEMATOCHEZIA: 0
COUGH: 0
DIARRHEA: 0
NERVOUS/ANXIOUS: 0
BREAST MASS: 0
ARTHRALGIAS: 1
HEARTBURN: 1
ABDOMINAL PAIN: 0
HEADACHES: 0
EYE PAIN: 0
SORE THROAT: 0
JOINT SWELLING: 0
DYSURIA: 0
PARESTHESIAS: 0
NAUSEA: 0
CHILLS: 0
WEAKNESS: 0
FREQUENCY: 0
PALPITATIONS: 0
FEVER: 0
CONSTIPATION: 0
MYALGIAS: 1
SHORTNESS OF BREATH: 0

## 2023-06-26 ASSESSMENT — ACTIVITIES OF DAILY LIVING (ADL): CURRENT_FUNCTION: NO ASSISTANCE NEEDED

## 2023-07-03 ENCOUNTER — OFFICE VISIT (OUTPATIENT)
Dept: FAMILY MEDICINE | Facility: CLINIC | Age: 73
End: 2023-07-03
Payer: COMMERCIAL

## 2023-07-03 VITALS
HEIGHT: 59 IN | HEART RATE: 66 BPM | OXYGEN SATURATION: 97 % | TEMPERATURE: 97.5 F | DIASTOLIC BLOOD PRESSURE: 60 MMHG | SYSTOLIC BLOOD PRESSURE: 90 MMHG | WEIGHT: 128 LBS | BODY MASS INDEX: 25.8 KG/M2

## 2023-07-03 DIAGNOSIS — K90.9 INTESTINAL MALABSORPTION, UNSPECIFIED TYPE: ICD-10-CM

## 2023-07-03 DIAGNOSIS — E55.9 VITAMIN D DEFICIENCY, UNSPECIFIED: ICD-10-CM

## 2023-07-03 DIAGNOSIS — Z00.00 ENCOUNTER FOR MEDICARE ANNUAL WELLNESS EXAM: Primary | ICD-10-CM

## 2023-07-03 DIAGNOSIS — D50.8 OTHER IRON DEFICIENCY ANEMIA: ICD-10-CM

## 2023-07-03 DIAGNOSIS — Z98.84 S/P GASTRIC BYPASS: ICD-10-CM

## 2023-07-03 LAB
BASOPHILS # BLD AUTO: 0 10E3/UL (ref 0–0.2)
BASOPHILS NFR BLD AUTO: 1 %
DEPRECATED CALCIDIOL+CALCIFEROL SERPL-MC: 42 UG/L (ref 20–75)
EOSINOPHIL # BLD AUTO: 0.3 10E3/UL (ref 0–0.7)
EOSINOPHIL NFR BLD AUTO: 8 %
ERYTHROCYTE [DISTWIDTH] IN BLOOD BY AUTOMATED COUNT: ABNORMAL %
FERRITIN SERPL-MCNC: 36 NG/ML (ref 11–328)
HCT VFR BLD AUTO: 34.9 % (ref 35–47)
HGB BLD-MCNC: 10.9 G/DL (ref 11.7–15.7)
IMM GRANULOCYTES # BLD: 0 10E3/UL
IMM GRANULOCYTES NFR BLD: 0 %
LYMPHOCYTES # BLD AUTO: 1.4 10E3/UL (ref 0.8–5.3)
LYMPHOCYTES NFR BLD AUTO: 38 %
MCH RBC QN AUTO: 28.2 PG (ref 26.5–33)
MCHC RBC AUTO-ENTMCNC: 31.2 G/DL (ref 31.5–36.5)
MCV RBC AUTO: 90 FL (ref 78–100)
MONOCYTES # BLD AUTO: 0.3 10E3/UL (ref 0–1.3)
MONOCYTES NFR BLD AUTO: 9 %
NEUTROPHILS # BLD AUTO: 1.6 10E3/UL (ref 1.6–8.3)
NEUTROPHILS NFR BLD AUTO: 44 %
NRBC # BLD AUTO: 0 10E3/UL
NRBC BLD AUTO-RTO: 0 /100
PLATELET # BLD AUTO: 301 10E3/UL (ref 150–450)
PTH-INTACT SERPL-MCNC: 98 PG/ML (ref 15–65)
RBC # BLD AUTO: 3.86 10E6/UL (ref 3.8–5.2)
WBC # BLD AUTO: 3.7 10E3/UL (ref 4–11)

## 2023-07-03 PROCEDURE — 84630 ASSAY OF ZINC: CPT | Mod: 90 | Performed by: FAMILY MEDICINE

## 2023-07-03 PROCEDURE — 82747 ASSAY OF FOLIC ACID RBC: CPT | Mod: 90 | Performed by: FAMILY MEDICINE

## 2023-07-03 PROCEDURE — 82728 ASSAY OF FERRITIN: CPT | Performed by: FAMILY MEDICINE

## 2023-07-03 PROCEDURE — 99000 SPECIMEN HANDLING OFFICE-LAB: CPT | Performed by: FAMILY MEDICINE

## 2023-07-03 PROCEDURE — 82306 VITAMIN D 25 HYDROXY: CPT | Performed by: FAMILY MEDICINE

## 2023-07-03 PROCEDURE — 85025 COMPLETE CBC W/AUTO DIFF WBC: CPT | Performed by: FAMILY MEDICINE

## 2023-07-03 PROCEDURE — 83970 ASSAY OF PARATHORMONE: CPT | Performed by: FAMILY MEDICINE

## 2023-07-03 PROCEDURE — G0438 PPPS, INITIAL VISIT: HCPCS | Performed by: FAMILY MEDICINE

## 2023-07-03 PROCEDURE — 36415 COLL VENOUS BLD VENIPUNCTURE: CPT | Performed by: FAMILY MEDICINE

## 2023-07-03 PROCEDURE — 84425 ASSAY OF VITAMIN B-1: CPT | Mod: 90 | Performed by: FAMILY MEDICINE

## 2023-07-03 ASSESSMENT — ACTIVITIES OF DAILY LIVING (ADL): CURRENT_FUNCTION: NO ASSISTANCE NEEDED

## 2023-07-03 ASSESSMENT — ENCOUNTER SYMPTOMS
WEAKNESS: 0
BREAST MASS: 0
MYALGIAS: 1
HEARTBURN: 1
HEMATURIA: 0
EYE PAIN: 0
HEADACHES: 0
CHILLS: 0
PARESTHESIAS: 0
FEVER: 0
JOINT SWELLING: 0
SHORTNESS OF BREATH: 0
PALPITATIONS: 0
DYSURIA: 0
FREQUENCY: 0
DIARRHEA: 0
SORE THROAT: 0
NAUSEA: 0
DIZZINESS: 0
CONSTIPATION: 0
ARTHRALGIAS: 1
ABDOMINAL PAIN: 0
COUGH: 0
HEMATOCHEZIA: 0
NERVOUS/ANXIOUS: 0

## 2023-07-03 NOTE — PROGRESS NOTES
"SUBJECTIVE:   Carly is a 72 year old who presents for Preventive Visit.    Are you in the first 12 months of your Medicare coverage?  No    Healthy Habits:     In general, how would you rate your overall health?  Good    Frequency of exercise:  2-3 days/week    Duration of exercise:  45-60 minutes    Do you usually eat at least 4 servings of fruit and vegetables a day, include whole grains    & fiber and avoid regularly eating high fat or \"junk\" foods?  No    Taking medications regularly:  Yes    Medication side effects:  None    Ability to successfully perform activities of daily living:  No assistance needed    Home Safety:  No safety concerns identified    Hearing Impairment:  No hearing concerns    In the past 6 months, have you been bothered by leaking of urine?  No    In general, how would you rate your overall mental or emotional health?  Good    Additional concerns today:  No        Have you ever done Advance Care Planning? (For example, a Health Directive, POLST, or a discussion with a medical provider or your loved ones about your wishes): Yes, patient states has an Advance Care Planning document and will bring a copy to the clinic.      Fall risk  Fallen 2 or more times in the past year?: No  Any fall with injury in the past year?: No      Cognitive Screening   1) Repeat 3 items (Leader, Season, Table)    2) Clock draw: NORMAL  3) 3 item recall: Recalls 3 objects  Results: 3 items recalled: COGNITIVE IMPAIRMENT LESS LIKELY  Mini-CogTM Copyright S Ton. Licensed by the author for use in [facility]; reprinted with permission (aide@.[facility]). All rights reserved.        Do you have sleep apnea, excessive snoring or daytime drowsiness?: no      Reviewed and updated as needed this visit by clinical staff   Tobacco  Allergies  Meds  Problems  Med Hx  Surg Hx  Fam Hx          Reviewed and updated as needed this visit by Provider                 Social History     Tobacco Use     Smoking " status: Never     Smokeless tobacco: Never   Substance Use Topics     Alcohol use: Not Currently     Comment: Maybe a couple glasses of wine on special Holidays.             6/26/2023     8:59 AM   Alcohol Use   Prescreen: >3 drinks/day or >7 drinks/week? Not Applicable        Do you have a current opioid prescription? No  Do you use any other controlled substances or medications that are not prescribed by a provider? None        Has been working with a      Current providers sharing in care for this patient include:   Patient Care Team:  Namita Perkins MD as PCP - General (Family Medicine)  Namita Perkins MD as Assigned PCP    The following health maintenance items are reviewed in Epic and correct as of today:  Health Maintenance   Topic Date Due     URINE DRUG SCREEN  Never done     COVID-19 Vaccine (5 - Moderna series) 10/03/2022     INFLUENZA VACCINE (1) 09/01/2023     PHQ-9  11/08/2023     ANNUAL REVIEW OF HM ORDERS  06/07/2024     MEDICARE ANNUAL WELLNESS VISIT  07/03/2024     FALL RISK ASSESSMENT  07/03/2024     MAMMO SCREENING  11/08/2024     LIPID  05/08/2028     ADVANCE CARE PLANNING  07/03/2028     COLORECTAL CANCER SCREENING  08/10/2028     DTAP/TDAP/TD IMMUNIZATION (3 - Td or Tdap) 04/27/2032     DEXA  05/23/2032     HEPATITIS C SCREENING  Completed     DEPRESSION ACTION PLAN  Completed     Pneumococcal Vaccine: 65+ Years  Completed     ZOSTER IMMUNIZATION  Completed     IPV IMMUNIZATION  Aged Out     MENINGITIS IMMUNIZATION  Aged Out     Lab work is in process  Mammogram Screening: Mammogram Screening - Patient over age 75, has elected to continue with screening.        Review of Systems   Constitutional: Negative for chills and fever.   HENT: Negative for congestion, ear pain, hearing loss and sore throat.    Eyes: Negative for pain and visual disturbance.   Respiratory: Negative for cough and shortness of breath.    Cardiovascular: Negative for chest pain, palpitations and peripheral  "edema.   Gastrointestinal: Positive for heartburn. Negative for abdominal pain, constipation, diarrhea, hematochezia and nausea.   Breasts:  Negative for tenderness, breast mass and discharge.   Genitourinary: Negative for dysuria, frequency, genital sores, hematuria, pelvic pain, urgency, vaginal bleeding and vaginal discharge.   Musculoskeletal: Positive for arthralgias and myalgias. Negative for joint swelling.   Skin: Negative for rash.   Neurological: Negative for dizziness, weakness, headaches and paresthesias.   Psychiatric/Behavioral: Negative for mood changes. The patient is not nervous/anxious.          OBJECTIVE:   BP 90/60 (BP Location: Right arm, Patient Position: Sitting, Cuff Size: Adult Regular)   Pulse 66   Temp 97.5  F (36.4  C) (Tympanic)   Ht 1.505 m (4' 11.25\")   Wt 58.1 kg (128 lb)   SpO2 97%   BMI 25.64 kg/m   Estimated body mass index is 25.64 kg/m  as calculated from the following:    Height as of this encounter: 1.505 m (4' 11.25\").    Weight as of this encounter: 58.1 kg (128 lb).  Physical Exam  GENERAL APPEARANCE: healthy, alert and no distress  EYES: Eyes grossly normal to inspection, PERRL and conjunctivae and sclerae normal  HENT: ear canals and TM's normal, nose and mouth without ulcers or lesions, oropharynx clear and oral mucous membranes moist  NECK: no adenopathy, no asymmetry, masses, or scars and thyroid normal to palpation  RESP: lungs clear to auscultation - no rales, rhonchi or wheezes  BREAST: normal without masses, tenderness or nipple discharge and no palpable axillary masses or adenopathy  CV: regular rate and rhythm, normal S1 S2, no S3 or S4, no murmur, click or rub, no peripheral edema and peripheral pulses strong  ABDOMEN: soft, nontender, no hepatosplenomegaly, no masses and bowel sounds normal  MS: no musculoskeletal defects are noted and gait is age appropriate without ataxia  SKIN: no suspicious lesions or rashes  NEURO: Normal strength and tone, sensory " exam grossly normal, mentation intact and speech normal  PSYCH: mentation appears normal and affect normal/bright    Diagnostic Test Results:  Labs reviewed in Epic  No results found for this or any previous visit (from the past 24 hour(s)).    ASSESSMENT / PLAN:   (Z00.00) Encounter for Medicare annual wellness exam  (primary encounter diagnosis)  Comment:   Plan:     (D50.8) Other iron deficiency anemia  Comment:   Plan:     (Z98.84) S/P gastric bypass  Comment:   Plan:     (E55.9) Vitamin D deficiency, unspecified  Comment:   Plan:     (K90.9) Intestinal malabsorption, unspecified type  Comment:   Plan: labs previously ordered were released           COUNSELING:  Reviewed preventive health counseling, as reflected in patient instructions        She reports that she has never smoked. She has never used smokeless tobacco.      Appropriate preventive services were discussed with this patient, including applicable screening as appropriate for cardiovascular disease, diabetes, osteopenia/osteoporosis, and glaucoma.  As appropriate for age/gender, discussed screening for colorectal cancer, prostate cancer, breast cancer, and cervical cancer. Checklist reviewing preventive services available has been given to the patient.    Reviewed patients plan of care and provided an AVS. The Basic Care Plan (routine screening as documented in Health Maintenance) for Carly meets the Care Plan requirement. This Care Plan has been established and reviewed with the Patient.          Namita Perkins MD  [facility]    Identified Health Risks:    I have reviewed Opioid Use Disorder and Substance Use Disorder risk factors and made any needed referrals.

## 2023-07-03 NOTE — PATIENT INSTRUCTIONS
Patient Education   Personalized Prevention Plan  You are due for the preventive services outlined below.  Your care team is available to assist you in scheduling these services.  If you have already completed any of these items, please share that information with your care team to update in your medical record.  Health Maintenance Due   Topic Date Due     URINE DRUG SCREEN  Never done     COVID-19 Vaccine (5 - Moderna series) 10/03/2022

## 2023-07-05 LAB
FOLATE RBC-MCNC: 1202 NG/ML
ZINC SERPL-MCNC: 69.5 UG/DL

## 2023-07-06 LAB — VIT B1 PYROPHOSHATE BLD-SCNC: 153 NMOL/L

## 2023-08-07 NOTE — PROGRESS NOTES
10/13/22 1100   Appointment Info   Signing clinician's name / credentials Camila Neri, PT, DPT, OCS   Visits Used 27   Progress Note/Certification   Progress Note Completed Date 07/21/22   Subjective Report   Subjective Report Neck was still really tight after last session, had to use theracane after visit.   Manual Therapy   Manual Therapy: Mobilization, MFR, MLD, friction massage minutes (47041) 25   Skilled Intervention MT to decrease pain and improve mobility   Patient Response/Progress felt better   Treatment Detail in sitting: tool assisted STM to R UT, levator, rhomboids, thoracic paraspinals. TPR R UT and lat.   Plan   Plan for next session add lat pull downs, MT   Ortho Goal 1   Goal Identifier STG1   Goal Description Pt will be able to sit for longer than 45 minutes before onset of back pain   Goal Progress 7/12/22 able to sit 45-60 min   Target Date 03/17/22  (4/21/22)   Date Met 07/12/22   Ortho Goal 2   Goal Identifier LTG1   Goal Description Pt will be able to sleep throught the night without being woken up from back pain    Goal Progress 7/21/22 able to sleep thru the night w/ her pillow wedge but still waking   Target Date 06/22/22  (toss/turn--takes mm relaxer-- 5 hours of sleep)   Date Met 07/12/22   Ortho Goal 3   Goal Identifier LTG2   Goal Description Pt will be able to perform all ADL/IADL activities with pain of no greater than 3/10   Goal Progress 8/18/22, significatn improvment in activity tolerance with low/mid back pain. R shoulder discomfort improved in that it is no longer a sharp pain with prolonged use and now more of a dull intermittent aching/throbbing.   Target Date 06/22/22  (limited tolerance to activity)         DISCHARGE  Reason for Discharge: Patient has failed to schedule further appointments.    Discharge Plan: Patient to continue home program.    Referring Provider:  Namita Perkins

## 2023-11-14 ENCOUNTER — HOSPITAL ENCOUNTER (OUTPATIENT)
Dept: MAMMOGRAPHY | Facility: CLINIC | Age: 73
Discharge: HOME | End: 2023-11-14
Attending: FAMILY MEDICINE | Admitting: FAMILY MEDICINE
Payer: COMMERCIAL

## 2023-11-14 DIAGNOSIS — Z12.31 VISIT FOR SCREENING MAMMOGRAM: ICD-10-CM

## 2023-11-14 PROCEDURE — 77067 SCR MAMMO BI INCL CAD: CPT

## 2023-12-12 ENCOUNTER — TRANSFERRED RECORDS (OUTPATIENT)
Dept: HEALTH INFORMATION MANAGEMENT | Facility: CLINIC | Age: 73
End: 2023-12-12
Payer: COMMERCIAL

## 2024-01-29 DIAGNOSIS — M79.2 THORACIC NEURALGIA: ICD-10-CM

## 2024-01-29 DIAGNOSIS — G89.29 CHRONIC RIGHT-SIDED THORACIC BACK PAIN: ICD-10-CM

## 2024-01-29 DIAGNOSIS — M54.6 CHRONIC RIGHT-SIDED THORACIC BACK PAIN: ICD-10-CM

## 2024-01-29 RX ORDER — GABAPENTIN 400 MG/1
800 CAPSULE ORAL 3 TIMES DAILY
Qty: 600 CAPSULE | Refills: 2 | Status: SHIPPED | OUTPATIENT
Start: 2024-01-29 | End: 2024-05-28

## 2024-05-28 ENCOUNTER — TELEPHONE (OUTPATIENT)
Dept: FAMILY MEDICINE | Facility: CLINIC | Age: 74
End: 2024-05-28
Payer: COMMERCIAL

## 2024-05-28 DIAGNOSIS — M54.6 CHRONIC RIGHT-SIDED THORACIC BACK PAIN: ICD-10-CM

## 2024-05-28 DIAGNOSIS — G89.29 CHRONIC RIGHT-SIDED THORACIC BACK PAIN: ICD-10-CM

## 2024-05-28 DIAGNOSIS — M79.2 THORACIC NEURALGIA: ICD-10-CM

## 2024-05-28 NOTE — TELEPHONE ENCOUNTER
Patient hoping to get 60 day script for gabapentin to get to 7/9 appt. Patient leaving Friday on a trip, requesting filled by Thursday please.    Thank you,    Debi River, ANTHONY Bower

## 2024-05-29 RX ORDER — GABAPENTIN 400 MG/1
800 CAPSULE ORAL 3 TIMES DAILY
Qty: 600 CAPSULE | Refills: 2 | Status: SHIPPED | OUTPATIENT
Start: 2024-05-29 | End: 2024-07-08

## 2024-05-29 NOTE — TELEPHONE ENCOUNTER
Patient usually uses [facility] and will not be home to get script. [facility] so she can take script with her on trip.      Debi River, ANTHONY Bower

## 2024-05-29 NOTE — TELEPHONE ENCOUNTER
There are 2 refills for 3 months on her last prescription. Have her call her pharmacy. Namita Perkins M.D.

## 2024-07-04 SDOH — HEALTH STABILITY: PHYSICAL HEALTH: ON AVERAGE, HOW MANY DAYS PER WEEK DO YOU ENGAGE IN MODERATE TO STRENUOUS EXERCISE (LIKE A BRISK WALK)?: 3 DAYS

## 2024-07-04 SDOH — HEALTH STABILITY: PHYSICAL HEALTH: ON AVERAGE, HOW MANY MINUTES DO YOU ENGAGE IN EXERCISE AT THIS LEVEL?: 40 MIN

## 2024-07-04 ASSESSMENT — SOCIAL DETERMINANTS OF HEALTH (SDOH): HOW OFTEN DO YOU GET TOGETHER WITH FRIENDS OR RELATIVES?: THREE TIMES A WEEK

## 2024-07-08 DIAGNOSIS — F33.42 RECURRENT MAJOR DEPRESSION IN COMPLETE REMISSION (H): ICD-10-CM

## 2024-07-08 DIAGNOSIS — M54.6 CHRONIC RIGHT-SIDED THORACIC BACK PAIN: ICD-10-CM

## 2024-07-08 DIAGNOSIS — M79.2 THORACIC NEURALGIA: ICD-10-CM

## 2024-07-08 DIAGNOSIS — G89.29 CHRONIC RIGHT-SIDED THORACIC BACK PAIN: ICD-10-CM

## 2024-07-08 ASSESSMENT — PATIENT HEALTH QUESTIONNAIRE - PHQ9
SUM OF ALL RESPONSES TO PHQ QUESTIONS 1-9: 3
SUM OF ALL RESPONSES TO PHQ QUESTIONS 1-9: 3
10. IF YOU CHECKED OFF ANY PROBLEMS, HOW DIFFICULT HAVE THESE PROBLEMS MADE IT FOR YOU TO DO YOUR WORK, TAKE CARE OF THINGS AT HOME, OR GET ALONG WITH OTHER PEOPLE: NOT DIFFICULT AT ALL

## 2024-07-09 ENCOUNTER — OFFICE VISIT (OUTPATIENT)
Dept: FAMILY MEDICINE | Facility: CLINIC | Age: 74
End: 2024-07-09
Attending: FAMILY MEDICINE
Payer: COMMERCIAL

## 2024-07-09 VITALS
OXYGEN SATURATION: 98 % | DIASTOLIC BLOOD PRESSURE: 62 MMHG | TEMPERATURE: 98.4 F | HEART RATE: 63 BPM | HEIGHT: 59 IN | BODY MASS INDEX: 26.81 KG/M2 | WEIGHT: 133 LBS | SYSTOLIC BLOOD PRESSURE: 108 MMHG

## 2024-07-09 DIAGNOSIS — Z98.84 S/P GASTRIC BYPASS: ICD-10-CM

## 2024-07-09 DIAGNOSIS — M54.6 CHRONIC RIGHT-SIDED THORACIC BACK PAIN: ICD-10-CM

## 2024-07-09 DIAGNOSIS — Z00.00 ENCOUNTER FOR MEDICARE ANNUAL WELLNESS EXAM: Primary | ICD-10-CM

## 2024-07-09 DIAGNOSIS — D50.8 OTHER IRON DEFICIENCY ANEMIA: ICD-10-CM

## 2024-07-09 DIAGNOSIS — F33.42 RECURRENT MAJOR DEPRESSION IN COMPLETE REMISSION (H): ICD-10-CM

## 2024-07-09 DIAGNOSIS — G89.29 CHRONIC RIGHT-SIDED THORACIC BACK PAIN: ICD-10-CM

## 2024-07-09 LAB
ALBUMIN SERPL BCG-MCNC: 3.7 G/DL (ref 3.5–5.2)
ALP SERPL-CCNC: 131 U/L (ref 40–150)
ALT SERPL W P-5'-P-CCNC: 43 U/L (ref 0–50)
ANION GAP SERPL CALCULATED.3IONS-SCNC: 9 MMOL/L (ref 7–15)
AST SERPL W P-5'-P-CCNC: 36 U/L (ref 0–45)
BILIRUB SERPL-MCNC: 0.5 MG/DL
BUN SERPL-MCNC: 12.3 MG/DL (ref 8–23)
CALCIUM SERPL-MCNC: 9.8 MG/DL (ref 8.8–10.2)
CHLORIDE SERPL-SCNC: 104 MMOL/L (ref 98–107)
CREAT SERPL-MCNC: 0.71 MG/DL (ref 0.51–0.95)
DEPRECATED HCO3 PLAS-SCNC: 25 MMOL/L (ref 22–29)
EGFRCR SERPLBLD CKD-EPI 2021: 90 ML/MIN/1.73M2
ERYTHROCYTE [DISTWIDTH] IN BLOOD BY AUTOMATED COUNT: 13.2 % (ref 10–15)
FERRITIN SERPL-MCNC: 41 NG/ML (ref 11–328)
GLUCOSE SERPL-MCNC: 88 MG/DL (ref 70–99)
HCT VFR BLD AUTO: 41 % (ref 35–47)
HGB BLD-MCNC: 13.1 G/DL (ref 11.7–15.7)
MCH RBC QN AUTO: 32.7 PG (ref 26.5–33)
MCHC RBC AUTO-ENTMCNC: 32 G/DL (ref 31.5–36.5)
MCV RBC AUTO: 102 FL (ref 78–100)
PLATELET # BLD AUTO: 271 10E3/UL (ref 150–450)
POTASSIUM SERPL-SCNC: 4.2 MMOL/L (ref 3.4–5.3)
PROT SERPL-MCNC: 6.5 G/DL (ref 6.4–8.3)
PTH-INTACT SERPL-MCNC: 76 PG/ML (ref 15–65)
RBC # BLD AUTO: 4.01 10E6/UL (ref 3.8–5.2)
SODIUM SERPL-SCNC: 138 MMOL/L (ref 135–145)
WBC # BLD AUTO: 4 10E3/UL (ref 4–11)

## 2024-07-09 PROCEDURE — 85027 COMPLETE CBC AUTOMATED: CPT | Performed by: FAMILY MEDICINE

## 2024-07-09 PROCEDURE — G0439 PPPS, SUBSEQ VISIT: HCPCS | Performed by: FAMILY MEDICINE

## 2024-07-09 PROCEDURE — 82728 ASSAY OF FERRITIN: CPT | Performed by: FAMILY MEDICINE

## 2024-07-09 PROCEDURE — 99213 OFFICE O/P EST LOW 20 MIN: CPT | Mod: 25 | Performed by: FAMILY MEDICINE

## 2024-07-09 PROCEDURE — 83970 ASSAY OF PARATHORMONE: CPT | Performed by: FAMILY MEDICINE

## 2024-07-09 PROCEDURE — 80053 COMPREHEN METABOLIC PANEL: CPT | Performed by: FAMILY MEDICINE

## 2024-07-09 PROCEDURE — 36415 COLL VENOUS BLD VENIPUNCTURE: CPT | Performed by: FAMILY MEDICINE

## 2024-07-09 RX ORDER — TIZANIDINE 2 MG/1
2 TABLET ORAL 3 TIMES DAILY PRN
Qty: 270 TABLET | Refills: 3 | Status: SHIPPED | OUTPATIENT
Start: 2024-07-09

## 2024-07-09 NOTE — PROGRESS NOTES
"Preventive Care Visit  [facility] GEORGE Perkins MD, Family Medicine  Jul 9, 2024      Assessment & Plan     Encounter for Medicare annual wellness exam      Chronic right-sided thoracic back pain  Try the mr at bedtime   - tiZANidine (ZANAFLEX) 2 MG tablet; Take 1 tablet (2 mg) by mouth 3 times daily as needed for muscle spasms  - Comprehensive metabolic panel (BMP + Alb, Alk Phos, ALT, AST, Total. Bili, TP); Future  - Comprehensive metabolic panel (BMP + Alb, Alk Phos, ALT, AST, Total. Bili, TP)    Recurrent major depression in complete remission (H24)  In remission    S/P gastric bypass  Check labs   - Parathyroid Hormone Intact; Future  - Comprehensive metabolic panel (BMP + Alb, Alk Phos, ALT, AST, Total. Bili, TP); Future  - CBC with platelets; Future  - Parathyroid Hormone Intact  - Comprehensive metabolic panel (BMP + Alb, Alk Phos, ALT, AST, Total. Bili, TP)  - CBC with platelets    Other iron deficiency anemia  History of  due to byoas  - Ferritin; Future  - CBC with platelets; Future  - Ferritin  - CBC with platelets    Patient has been advised of split billing requirements and indicates understanding: Yes        BMI  Estimated body mass index is 26.64 kg/m  as calculated from the following:    Height as of this encounter: 1.505 m (4' 11.25\").    Weight as of this encounter: 60.3 kg (133 lb).       Counseling  Appropriate preventive services were discussed with this patient, including applicable screening as appropriate for fall prevention, nutrition, physical activity, Tobacco-use cessation, weight loss and cognition.  Checklist reviewing preventive services available has been given to the patient.      Patient instructed to return to the office in 2 weeks  for reevaluation unless improving. Signs and symptoms of worsening are reviewed with the patient. If symptoms acutely change and condition worsens patient is instructed to seek medical evaluation through the office or urgent " care department or if during non business hours through the emergency department.      Saroj Carroll is a 73 year old, presenting for the following:  Physical        7/9/2024     1:06 PM   Additional Questions   Roomed by Coreen OLIVEROS CMA        Health Care Directive  Patient has a Health Care Directive on file  Advance care planning document is on file and is current.    HPI    Has had hair loss for the last year, would like to add Biotin if that is ok with other medications.spuld be   Bone density test??  Parathyroid - discuss labs she had done.     Wt Readings from Last 10 Encounters:   07/09/24 60.3 kg (133 lb)   07/03/23 58.1 kg (128 lb)   06/07/23 57.2 kg (126 lb)   05/08/23 55.6 kg (122 lb 9.6 oz)   08/23/22 55.3 kg (122 lb)   06/15/22 55.8 kg (123 lb)   04/27/22 56.7 kg (125 lb)   02/23/22 56.7 kg (125 lb)   09/03/21 58.4 kg (128 lb 12.8 oz)   08/11/21 60.1 kg (132 lb 8 oz)       She is concerned about her weight it has pretty much the same she is worried  abou t gaining   We also checked the dexa status   Also asked about glucosamine and chondroitin          Feels a scratchy feeling in her sternum but it only       7/4/2024   General Health   How would you rate your overall physical health? Good   Feel stress (tense, anxious, or unable to sleep) Not at all            7/4/2024   Nutrition   Diet: Regular (no restrictions)            7/4/2024   Exercise   Days per week of moderate/strenous exercise 3 days   Average minutes spent exercising at this level 40 min            7/4/2024   Social Factors   Frequency of gathering with friends or relatives Three times a week   Worry food won't last until get money to buy more No   Food not last or not have enough money for food? No   Do you have housing? (Housing is defined as stable permanent housing and does not include staying ouside in a car, in a tent, in an abandoned building, in an overnight shelter, or couch-surfing.) Yes   Are you worried about losing your  housing? No   Lack of transportation? No   Unable to get utilities (heat,electricity)? No            7/4/2024   Fall Risk   Fallen 2 or more times in the past year? No    No   Trouble with walking or balance? No    No       Multiple values from one day are sorted in reverse-chronological order          7/4/2024   Activities of Daily Living- Home Safety   Needs help with the following daily activites None of the above   Safety concerns in the home No grab bars in the bathroom            7/4/2024   Dental   Dentist two times every year? Yes            7/4/2024   Hearing Screening   Hearing concerns? None of the above            7/4/2024   Driving Risk Screening   Patient/family members have concerns about driving No            7/4/2024   General Alertness/Fatigue Screening   Have you been more tired than usual lately? (!) YES            7/4/2024   Urinary Incontinence Screening   Bothered by leaking urine in past 6 months No            7/4/2024   TB Screening   Were you born outside of the [place]? No          Today's PHQ-9 Score:       7/8/2024     1:42 PM   PHQ-9 SCORE   PHQ-9 Total Score MyChart 3 (Minimal depression)   PHQ-9 Total Score 3         7/4/2024   Substance Use   Alcohol more than 3/day or more than 7/wk Not Applicable   Do you have a current opioid prescription? No   How severe/bad is pain from 1 to 10? 2/10   Do you use any other substances recreationally? No        Social History     Tobacco Use    Smoking status: Never    Smokeless tobacco: Never   Vaping Use    Vaping status: Never Used   Substance Use Topics    Alcohol use: Not Currently     Comment: Maybe a couple glasses of wine on special Holidays.    Drug use: No           11/14/2023   LAST FHS-7 RESULTS   1st degree relative breast or ovarian cancer No   Any relative bilateral breast cancer No   Any male have breast cancer No   Any ONE woman have BOTH breast AND ovarian cancer No   Any woman with breast cancer before 50yrs No   2 or more relatives  with breast AND/OR ovarian cancer No   2 or more relatives with breast AND/OR bowel cancer No           Mammogram Screening - Mammogram every 1-2 years updated in Health Maintenance based on mutual decision making    ASCVD Risk   The 10-year ASCVD risk score (Anjum READ, et al., 2019) is: 8.1%    Values used to calculate the score:      Age: 73 years      Sex: Female      Is Non- : No      Diabetic: No      Tobacco smoker: No      Systolic Blood Pressure: 108 mmHg      Is BP treated: No      HDL Cholesterol: 66 mg/dL      Total Cholesterol: 171 mg/dL            Reviewed and updated as needed this visit by Provider                    Past Medical History:   Diagnosis Date    Arthritis     Many years ago    B12 deficiency     Depressive disorder     Many years ago    History of blood transfusion     Internal bleeding -date on file.    Insomnia     Mood swings      Current providers sharing in care for this patient include:  Patient Care Team:  Namita Perkins MD as PCP - General (Family Medicine)  Namita Perkins MD as Assigned PCP    The following health maintenance items are reviewed in Epic and correct as of today:  Health Maintenance   Topic Date Due    COVID-19 Vaccine (5 - 2023-24 season) 09/01/2023    INFLUENZA VACCINE (1) 09/01/2024    PHQ-9  01/09/2025    MEDICARE ANNUAL WELLNESS VISIT  07/09/2025    ANNUAL REVIEW OF HM ORDERS  07/09/2025    FALL RISK ASSESSMENT  07/09/2025    MAMMO SCREENING  11/14/2025    GLUCOSE  07/09/2027    LIPID  05/08/2028    COLORECTAL CANCER SCREENING  08/10/2028    ADVANCE CARE PLANNING  07/09/2029    DTAP/TDAP/TD IMMUNIZATION (3 - Td or Tdap) 04/27/2032    DEXA  05/23/2032    HEPATITIS C SCREENING  Completed    DEPRESSION ACTION PLAN  Completed    Pneumococcal Vaccine: 65+ Years  Completed    ZOSTER IMMUNIZATION  Completed    RSV VACCINE (Pregnancy & 60+)  Completed    IPV IMMUNIZATION  Aged Out    HPV IMMUNIZATION  Aged Out    MENINGITIS  "IMMUNIZATION  Aged Out    RSV MONOCLONAL ANTIBODY  Aged Out    URINE DRUG SCREEN  Discontinued         Review of Systems  CONSTITUTIONAL: NEGATIVE for fever, chills, change in weight  ENT/MOUTH: NEGATIVE for ear, mouth and throat problems  RESP: NEGATIVE for significant cough or SOB  CV: NEGATIVE for chest pain, palpitations or peripheral edema   Upper back pain  Objective    Exam  /62 (BP Location: Right arm, Patient Position: Chair, Cuff Size: Adult Regular)   Pulse 63   Temp 98.4  F (36.9  C) (Tympanic)   Ht 1.505 m (4' 11.25\")   Wt 60.3 kg (133 lb)   SpO2 98%   BMI 26.64 kg/m     Estimated body mass index is 26.64 kg/m  as calculated from the following:    Height as of this encounter: 1.505 m (4' 11.25\").    Weight as of this encounter: 60.3 kg (133 lb).    Physical Exam  GENERAL: alert and no distress  EYES: Eyes grossly normal to inspection, PERRL and conjunctivae and sclerae normal  HENT: ear canals and TM's normal, nose and mouth without ulcers or lesions  NECK: no adenopathy, no asymmetry, masses, or scars  RESP: lungs clear to auscultation - no rales, rhonchi or wheezes  CV: regular rate and rhythm, normal S1 S2, no S3 or S4, no murmur, click or rub, no peripheral edema   MS: no gross musculoskeletal defects noted, no edema  SKIN: no suspicious lesions or rashes  NEURO: Normal strength and tone, mentation intact and speech normal  BACK: no CVA tenderness, no paralumbar tenderness right paeriscapular tenderness extending to the trapezius          7/12/2024   Mini Cog   Clock Draw Score 2 Normal   3 Item Recall 3 objects recalled   Mini Cog Total Score 5            Vision Screen         Signed Electronically by: Namita Perkins MD    "

## 2024-07-09 NOTE — PATIENT INSTRUCTIONS
Patient Education   Preventive Care Advice   This is general advice given by our system to help you stay healthy. However, your care team may have specific advice just for you. Please talk to your care team about your preventive care needs.  Nutrition  Eat 5 or more servings of fruits and vegetables each day.  Try wheat bread, brown rice and whole grain pasta (instead of white bread, rice, and pasta).  Get enough calcium and vitamin D. Check the label on foods and aim for 100% of the RDA (recommended daily allowance).  Lifestyle  Exercise at least 150 minutes each week  (30 minutes a day, 5 days a week).  Do muscle strengthening activities 2 days a week. These help control your weight and prevent disease.  No smoking.  Wear sunscreen to prevent skin cancer.  Have a dental exam and cleaning every 6 months.  Yearly exams  See your health care team every year to talk about:  Any changes in your health.  Any medicines your care team has prescribed.  Preventive care, family planning, and ways to prevent chronic diseases.  Shots (vaccines)   HPV shots (up to age 26), if you've never had them before.  Hepatitis B shots (up to age 59), if you've never had them before.  COVID-19 shot: Get this shot when it's due.  Flu shot: Get a flu shot every year.  Tetanus shot: Get a tetanus shot every 10 years.  Pneumococcal, hepatitis A, and RSV shots: Ask your care team if you need these based on your risk.  Shingles shot (for age 50 and up)  General health tests  Diabetes screening:  Starting at age 35, Get screened for diabetes at least every 3 years.  If you are younger than age 35, ask your care team if you should be screened for diabetes.  Cholesterol test: At age 39, start having a cholesterol test every 5 years, or more often if advised.  Bone density scan (DEXA): At age 50, ask your care team if you should have this scan for osteoporosis (brittle bones).  Hepatitis C: Get tested at least once in your life.  STIs (sexually  transmitted infections)  Before age 24: Ask your care team if you should be screened for STIs.  After age 24: Get screened for STIs if you're at risk. You are at risk for STIs (including HIV) if:  You are sexually active with more than one person.  You don't use condoms every time.  You or a partner was diagnosed with a sexually transmitted infection.  If you are at risk for HIV, ask about PrEP medicine to prevent HIV.  Get tested for HIV at least once in your life, whether you are at risk for HIV or not.  Cancer screening tests  Cervical cancer screening: If you have a cervix, begin getting regular cervical cancer screening tests starting at age 21.  Breast cancer scan (mammogram): If you've ever had breasts, begin having regular mammograms starting at age 40. This is a scan to check for breast cancer.  Colon cancer screening: It is important to start screening for colon cancer at age 45.  Have a colonoscopy test every 10 years (or more often if you're at risk) Or, ask your provider about stool tests like a FIT test every year or Cologuard test every 3 years.  To learn more about your testing options, visit:   .  For help making a decision, visit:   https://bit.ly/id18586.  Prostate cancer screening test: If you have a prostate, ask your care team if a prostate cancer screening test (PSA) at age 55 is right for you.  Lung cancer screening: If you are a current or former smoker ages 50 to 80, ask your care team if ongoing lung cancer screenings are right for you.  For informational purposes only. Not to replace the advice of your health care provider. Copyright   2023 [facility]. All rights reserved. Clinically reviewed by the [facility] Transitions Program. Other Machine [id number] - REV 01/24.  Learning About Activities of Daily Living  What are activities of daily living?     Activities of daily living (ADLs) are the basic self-care tasks you do every day. These include eating, bathing, dressing,  and moving around.  As you age, and if you have health problems, you may find that it's harder to do some of these tasks. If so, your doctor can suggest ideas that may help.  To measure what kind of help you may need, your doctor will ask how well you are able to do ADLs. Let your doctor know if there are any tasks that you are having trouble doing. This is an important first step to getting help. And when you have the help you need, you can stay as independent as possible.  How will a doctor assess your ADLs?  Asking about ADLs is part of a routine health checkup your doctor will likely do as you age. Your health check might be done in a doctor's office, in your home, or at a hospital. The goal is to find out if you are having any problems that could make it hard to care for yourself or that make it unsafe for you to be on your own.  To measure your ADLs, your doctor will ask how hard it is for you to do routine tasks. Your doctor may also want to know if you have changed the way you do a task because of a health problem. Your doctor may watch how you:  Walk back and forth.  Keep your balance while you stand or walk.  Move from sitting to standing or from a bed to a chair.  Button or unbutton a shirt or sweater.  Remove and put on your shoes.  It's common to feel a little worried or anxious if you find you can't do all the things you used to be able to do. Talking with your doctor about ADLs is a way to make sure you're as safe as possible and able to care for yourself as well as you can. You may want to bring a caregiver, friend, or family member to your checkup. They can help you talk to your doctor.  Follow-up care is a key part of your treatment and safety. Be sure to make and go to all appointments, and call your doctor if you are having problems. It's also a good idea to know your test results and keep a list of the medicines you take.  Current as of: October 24, 2023               Content Version: 14.0     3226-9713 [employer].   Care instructions adapted under license by your healthcare professional. If you have questions about a medical condition or this instruction, always ask your healthcare professional. [employer] disclaims any warranty or liability for your use of this information.      Learning About Sleeping Well  What does sleeping well mean?     Sleeping well means getting enough sleep to feel good and stay healthy. How much sleep is enough varies among people.  The number of hours you sleep and how you feel when you wake up are both important. If you do not feel refreshed, you probably need more sleep. Another sign of not getting enough sleep is feeling tired during the day.  Experts recommend that adults get at least 7 or more hours of sleep per day. Children and older adults need more sleep.  Why is getting enough sleep important?  Getting enough quality sleep is a basic part of good health. When your sleep suffers, your physical health, mood, and your thoughts can suffer too. You may find yourself feeling more grumpy or stressed. Not getting enough sleep also can lead to serious problems, including injury, accidents, anxiety, and depression.  What might cause poor sleeping?  Many things can cause sleep problems, including:  Changes to your sleep schedule.  Stress. Stress can be caused by fear about a single event, such as giving a speech. Or you may have ongoing stress, such as worry about work or school.  Depression, anxiety, and other mental or emotional conditions.  Changes in your sleep habits or surroundings. This includes changes that happen where you sleep, such as noise, light, or sleeping in a different bed. It also includes changes in your sleep pattern, such as having jet lag or working a late shift.  Health problems, such as pain, breathing problems, and restless legs syndrome.  Lack of regular exercise.  Using alcohol, nicotine, or caffeine before bed.  How can  "you help yourself?  Here are some tips that may help you sleep more soundly and wake up feeling more refreshed.  Your sleeping area   Use your bedroom only for sleeping and sex. A bit of light reading may help you fall asleep. But if it doesn't, do your reading elsewhere in the house. Try not to use your TV, computer, smartphone, or tablet while you are in bed.  Be sure your bed is big enough to stretch out comfortably, especially if you have a sleep partner.  Keep your bedroom quiet, dark, and cool. Use curtains, blinds, or a sleep mask to block out light. To block out noise, use earplugs, soothing music, or a \"white noise\" machine.  Your evening and bedtime routine   Create a relaxing bedtime routine. You might want to take a warm shower or bath, or listen to soothing music.  Go to bed at the same time every night. And get up at the same time every morning, even if you feel tired.  What to avoid   Limit caffeine (coffee, tea, caffeinated sodas) during the day, and don't have any for at least 6 hours before bedtime.  Avoid drinking alcohol before bedtime. Alcohol can cause you to wake up more often during the night.  Try not to smoke or use tobacco, especially in the evening. Nicotine can keep you awake.  Limit naps during the day, especially close to bedtime.  Avoid lying in bed awake for too long. If you can't fall asleep or if you wake up in the middle of the night and can't get back to sleep within about 20 minutes, get out of bed and go to another room until you feel sleepy.  Avoid taking medicine right before bed that may keep you awake or make you feel hyper or energized. Your doctor can tell you if your medicine may do this and if you can take it earlier in the day.  If you can't sleep   Imagine yourself in a peaceful, pleasant scene. Focus on the details and feelings of being in a place that is relaxing.  Get up and do a quiet or boring activity until you feel sleepy.  Avoid drinking any liquids before " "going to bed to help prevent waking up often to use the bathroom.  Where can you learn more?  Go to https://www.[email or web address]/patiented  Enter J942 in the search box to learn more about \"Learning About Sleeping Well.\"  Current as of: July 10, 2023               Content Version: 14.0    6085-5879 [employer].   Care instructions adapted under license by your healthcare professional. If you have questions about a medical condition or this instruction, always ask your healthcare professional. [employer] disclaims any warranty or liability for your use of this information.         "

## 2024-07-10 RX ORDER — GABAPENTIN 400 MG/1
800 CAPSULE ORAL 3 TIMES DAILY
Qty: 600 CAPSULE | Refills: 2 | Status: SHIPPED | OUTPATIENT
Start: 2024-07-10

## 2024-11-15 ENCOUNTER — HOSPITAL ENCOUNTER (OUTPATIENT)
Dept: MAMMOGRAPHY | Facility: CLINIC | Age: 74
Discharge: HOME | End: 2024-11-15
Attending: FAMILY MEDICINE | Admitting: FAMILY MEDICINE
Payer: COMMERCIAL

## 2024-11-15 DIAGNOSIS — Z12.31 VISIT FOR SCREENING MAMMOGRAM: ICD-10-CM

## 2024-11-15 PROCEDURE — 77067 SCR MAMMO BI INCL CAD: CPT

## 2024-11-15 PROCEDURE — 77063 BREAST TOMOSYNTHESIS BI: CPT

## 2024-11-18 ENCOUNTER — TRANSFERRED RECORDS (OUTPATIENT)
Dept: HEALTH INFORMATION MANAGEMENT | Facility: CLINIC | Age: 74
End: 2024-11-18
Payer: COMMERCIAL

## 2024-12-09 DIAGNOSIS — F33.42 RECURRENT MAJOR DEPRESSION IN COMPLETE REMISSION (H): ICD-10-CM

## 2024-12-09 NOTE — TELEPHONE ENCOUNTER
Pt called and is almost out and she is leaving town in a couple weeks. She would like 90days sent to [facility] in [place] instead since she is almost out and will be leaving town soon.

## 2024-12-17 ENCOUNTER — NURSE TRIAGE (OUTPATIENT)
Dept: FAMILY MEDICINE | Facility: CLINIC | Age: 74
End: 2024-12-17
Payer: COMMERCIAL

## 2024-12-17 NOTE — TELEPHONE ENCOUNTER
Pt fell in  the tub and hit her head on Saturday morning. She hit tailbone and then head on the ceramic floor. No blood thinners. She is feeling well currently and has no symptoms. Pt is flying this weekend and is concerned about the cabin pressure. Hx of brain bleed after a fall several years ago. No red flag symptoms. Discussed reasons to call back.  Delores Mcclellan RN on 12/17/2024 at 11:27 AM   Additional Information   Negative: ACUTE NEURO SYMPTOM and symptom present now (Definition: Difficult to awaken OR confused thinking and talking OR slurred speech OR weakness of arms or legs OR unsteady walking.)   Negative: Knocked out (unconscious) > 1 minute   Negative: Seizure (convulsion) occurred  (Exception: Prior history of seizures and now alert and without Acute Neuro Symptoms.)   Negative: Neck pain after dangerous injury (e.g., MVA, diving, trampoline, contact sports, fall > 10 feet or 3 meters)  (Exception: Neck pain began > 1 hour after injury.)   Negative: Major bleeding (actively dripping or spurting) that can't be stopped   Negative: Penetrating head injury (e.g., knife, gunshot wound, metal object)   Negative: Sounds like a life-threatening emergency to the triager   Negative: Last tetanus shot > 5 years ago and DIRTY cut or scrape   Negative: Last tetanus shot > 10 years ago and CLEAN cut or scrape   Negative: After 3 days and headache persists   Negative: Suspicious history for the injury   Negative: Scalp bruise, pain, or swelling   Negative: Small cut (scratch) or abrasion (scrape) is also present   Negative: Black eye (bruising, purple color of eyelids) and onset < 24 hours after a forehead bruise   Negative: Patient is confused or is an unreliable provider of information (e.g., dementia, severe intellectual disability, alcohol intoxication)   Negative: No prior tetanus shots (or is not fully vaccinated) and any wound (e.g., cut or scrape)   Negative: HIV positive or severe immunodeficiency (severely  weak immune system) and DIRTY cut or scrape   Negative: One or two 'black eyes' (bruising, purple color of eyelids), onset over 24 hours after head injury   Negative: Patient wants to be seen   Negative: ACUTE NEURO SYMPTOM and now fine  (Definition: Difficult to awaken OR confused thinking and talking OR slurred speech OR weakness of arms or legs OR unsteady walking.)   Negative: Knocked out (unconscious) < 1 minute and now fine   Negative: SEVERE headache   Negative: Dangerous injury (e.g., MVA, diving, trampoline, contact sports, fall > 10 feet or 3 meters) or severe blow from hard object (e.g., golf club or baseball bat)   Negative: Large swelling or bruise (> 2 inches or 5 cm)   Negative: Skin is split open or gaping (length > 1/2 inch or 12 mm)   Negative: Bleeding won't stop after 10 minutes of direct pressure (using correct technique)   Negative: Taking Coumadin (warfarin) or other strong blood thinner, or known bleeding disorder (e.g., thrombocytopenia)   Negative: Age over 64 years with an area of head swelling or bruise   Negative: Sounds like a serious injury to the triager   Negative: Can't remember what happened (amnesia)   Negative: Vomiting once or more   Negative: Loss of vision or double vision is present now   Negative: Watery or blood-tinged fluid dripping from the nose or ears   Negative: Diagnosed with a concussion within last 14 days    Protocols used: Head Injury-A-[place]

## 2025-06-07 DIAGNOSIS — F33.42 RECURRENT MAJOR DEPRESSION IN COMPLETE REMISSION: ICD-10-CM

## 2025-07-05 SDOH — HEALTH STABILITY: PHYSICAL HEALTH: ON AVERAGE, HOW MANY DAYS PER WEEK DO YOU ENGAGE IN MODERATE TO STRENUOUS EXERCISE (LIKE A BRISK WALK)?: 6 DAYS

## 2025-07-05 SDOH — HEALTH STABILITY: PHYSICAL HEALTH: ON AVERAGE, HOW MANY MINUTES DO YOU ENGAGE IN EXERCISE AT THIS LEVEL?: 30 MIN

## 2025-07-05 ASSESSMENT — SOCIAL DETERMINANTS OF HEALTH (SDOH): HOW OFTEN DO YOU GET TOGETHER WITH FRIENDS OR RELATIVES?: MORE THAN THREE TIMES A WEEK

## 2025-07-09 ASSESSMENT — PATIENT HEALTH QUESTIONNAIRE - PHQ9
10. IF YOU CHECKED OFF ANY PROBLEMS, HOW DIFFICULT HAVE THESE PROBLEMS MADE IT FOR YOU TO DO YOUR WORK, TAKE CARE OF THINGS AT HOME, OR GET ALONG WITH OTHER PEOPLE: NOT DIFFICULT AT ALL
SUM OF ALL RESPONSES TO PHQ QUESTIONS 1-9: 4
SUM OF ALL RESPONSES TO PHQ QUESTIONS 1-9: 4

## 2025-07-10 ENCOUNTER — OFFICE VISIT (OUTPATIENT)
Dept: FAMILY MEDICINE | Facility: CLINIC | Age: 75
End: 2025-07-10
Payer: COMMERCIAL

## 2025-07-10 VITALS
TEMPERATURE: 98.7 F | HEIGHT: 59 IN | RESPIRATION RATE: 12 BRPM | WEIGHT: 128 LBS | OXYGEN SATURATION: 98 % | SYSTOLIC BLOOD PRESSURE: 92 MMHG | DIASTOLIC BLOOD PRESSURE: 58 MMHG | BODY MASS INDEX: 25.8 KG/M2 | HEART RATE: 63 BPM

## 2025-07-10 DIAGNOSIS — R25.1 TREMOR: ICD-10-CM

## 2025-07-10 DIAGNOSIS — G89.29 CHRONIC RIGHT-SIDED THORACIC BACK PAIN: ICD-10-CM

## 2025-07-10 DIAGNOSIS — Z78.0 MENOPAUSE: ICD-10-CM

## 2025-07-10 DIAGNOSIS — H53.9 VISUAL DISTURBANCE: ICD-10-CM

## 2025-07-10 DIAGNOSIS — Z00.00 ENCOUNTER FOR MEDICARE ANNUAL WELLNESS EXAM: Primary | ICD-10-CM

## 2025-07-10 DIAGNOSIS — M54.6 CHRONIC RIGHT-SIDED THORACIC BACK PAIN: ICD-10-CM

## 2025-07-10 DIAGNOSIS — F33.42 RECURRENT MAJOR DEPRESSION IN COMPLETE REMISSION: ICD-10-CM

## 2025-07-10 DIAGNOSIS — M79.2 THORACIC NEURALGIA: ICD-10-CM

## 2025-07-10 DIAGNOSIS — Z12.31 ENCOUNTER FOR SCREENING MAMMOGRAM FOR MALIGNANT NEOPLASM OF BREAST: ICD-10-CM

## 2025-07-10 DIAGNOSIS — Z78.0 POST-MENOPAUSAL: ICD-10-CM

## 2025-07-10 LAB
ALBUMIN SERPL BCG-MCNC: 3.9 G/DL (ref 3.5–5.2)
ALP SERPL-CCNC: 88 U/L (ref 40–150)
ALT SERPL W P-5'-P-CCNC: 33 U/L (ref 0–50)
ANION GAP SERPL CALCULATED.3IONS-SCNC: 9 MMOL/L (ref 7–15)
AST SERPL W P-5'-P-CCNC: 36 U/L (ref 0–45)
BILIRUB SERPL-MCNC: 0.5 MG/DL
BUN SERPL-MCNC: 11.4 MG/DL (ref 8–23)
CALCIUM SERPL-MCNC: 10.1 MG/DL (ref 8.8–10.4)
CHLORIDE SERPL-SCNC: 107 MMOL/L (ref 98–107)
CREAT SERPL-MCNC: 0.8 MG/DL (ref 0.51–0.95)
CRP SERPL-MCNC: <3 MG/L
EGFRCR SERPLBLD CKD-EPI 2021: 77 ML/MIN/1.73M2
ERYTHROCYTE [DISTWIDTH] IN BLOOD BY AUTOMATED COUNT: 13.1 % (ref 10–15)
ERYTHROCYTE [SEDIMENTATION RATE] IN BLOOD BY WESTERGREN METHOD: 14 MM/HR (ref 0–30)
GLUCOSE SERPL-MCNC: 87 MG/DL (ref 70–99)
HCO3 SERPL-SCNC: 25 MMOL/L (ref 22–29)
HCT VFR BLD AUTO: 39.6 % (ref 35–47)
HGB BLD-MCNC: 12.8 G/DL (ref 11.7–15.7)
MCH RBC QN AUTO: 31.8 PG (ref 26.5–33)
MCHC RBC AUTO-ENTMCNC: 32.3 G/DL (ref 31.5–36.5)
MCV RBC AUTO: 99 FL (ref 78–100)
PLATELET # BLD AUTO: 240 10E3/UL (ref 150–450)
POTASSIUM SERPL-SCNC: 5.1 MMOL/L (ref 3.4–5.3)
PROT SERPL-MCNC: 6.5 G/DL (ref 6.4–8.3)
RBC # BLD AUTO: 4.02 10E6/UL (ref 3.8–5.2)
SODIUM SERPL-SCNC: 141 MMOL/L (ref 135–145)
WBC # BLD AUTO: 4 10E3/UL (ref 4–11)

## 2025-07-10 RX ORDER — TIZANIDINE 2 MG/1
2 TABLET ORAL 2 TIMES DAILY
Qty: 180 TABLET | Refills: 3 | Status: SHIPPED | OUTPATIENT
Start: 2025-07-10

## 2025-07-10 RX ORDER — GABAPENTIN 400 MG/1
800 CAPSULE ORAL 2 TIMES DAILY
Qty: 360 CAPSULE | Refills: 3 | Status: SHIPPED | OUTPATIENT
Start: 2025-07-10

## 2025-07-10 NOTE — PATIENT INSTRUCTIONS
Patient Education   Preventive Care Advice   This is general advice given by our system to help you stay healthy. However, your care team may have specific advice just for you. Please talk to your care team about your preventive care needs.  Nutrition  Eat 5 or more servings of fruits and vegetables each day.  Try wheat bread, brown rice and whole grain pasta (instead of white bread, rice, and pasta).  Get enough calcium and vitamin D. Check the label on foods and aim for 100% of the RDA (recommended daily allowance).  Lifestyle  Exercise at least 150 minutes each week  (30 minutes a day, 5 days a week).  Do muscle strengthening activities 2 days a week. These help control your weight and prevent disease.  No smoking.  Wear sunscreen to prevent skin cancer.  Have a dental exam and cleaning every 6 months.  Yearly exams  See your health care team every year to talk about:  Any changes in your health.  Any medicines your care team has prescribed.  Preventive care, family planning, and ways to prevent chronic diseases.  Shots (vaccines)   HPV shots (up to age 26), if you've never had them before.  Hepatitis B shots (up to age 59), if you've never had them before.  COVID-19 shot: Get this shot when it's due.  Flu shot: Get a flu shot every year.  Tetanus shot: Get a tetanus shot every 10 years.  Pneumococcal, hepatitis A, and RSV shots: Ask your care team if you need these based on your risk.  Shingles shot (for age 50 and up)  General health tests  Diabetes screening:  Starting at age 35, Get screened for diabetes at least every 3 years.  If you are younger than age 35, ask your care team if you should be screened for diabetes.  Cholesterol test: At age 39, start having a cholesterol test every 5 years, or more often if advised.  Bone density scan (DEXA): At age 50, ask your care team if you should have this scan for osteoporosis (brittle bones).  Hepatitis C: Get tested at least once in your life.  STIs (sexually  transmitted infections)  Before age 24: Ask your care team if you should be screened for STIs.  After age 24: Get screened for STIs if you're at risk. You are at risk for STIs (including HIV) if:  You are sexually active with more than one person.  You don't use condoms every time.  You or a partner was diagnosed with a sexually transmitted infection.  If you are at risk for HIV, ask about PrEP medicine to prevent HIV.  Get tested for HIV at least once in your life, whether you are at risk for HIV or not.  Cancer screening tests  Cervical cancer screening: If you have a cervix, begin getting regular cervical cancer screening tests starting at age 21.  Breast cancer scan (mammogram): If you've ever had breasts, begin having regular mammograms starting at age 40. This is a scan to check for breast cancer.  Colon cancer screening: It is important to start screening for colon cancer at age 45.  Have a colonoscopy test every 10 years (or more often if you're at risk) Or, ask your provider about stool tests like a FIT test every year or Cologuard test every 3 years.  To learn more about your testing options, visit:   .  For help making a decision, visit:   https://bit.ly/kx06235.  Prostate cancer screening test: If you have a prostate, ask your care team if a prostate cancer screening test (PSA) at age 55 is right for you.  Lung cancer screening: If you are a current or former smoker ages 50 to 80, ask your care team if ongoing lung cancer screenings are right for you.  For informational purposes only. Not to replace the advice of your health care provider. Copyright   2023 [facility]. All rights reserved. Clinically reviewed by the [facility] Transitions Program. [employer] [id number] - REV 01/24.  Learning About Sleeping Well  What does sleeping well mean?     Sleeping well means getting enough sleep to feel good and stay healthy. How much sleep is enough varies among people.  The number of hours you  sleep and how you feel when you wake up are both important. If you do not feel refreshed, you probably need more sleep. Another sign of not getting enough sleep is feeling tired during the day.  Experts recommend that adults get at least 7 or more hours of sleep per day. Children and older adults need more sleep.  Why is getting enough sleep important?  Getting enough quality sleep is a basic part of good health. When your sleep suffers, your physical health, mood, and your thoughts can suffer too. You may find yourself feeling more grumpy or stressed. Not getting enough sleep also can lead to serious problems, including injury, accidents, anxiety, and depression.  What might cause poor sleeping?  Many things can cause sleep problems, including:  Changes to your sleep schedule.  Stress. Stress can be caused by fear about a single event, such as giving a speech. Or you may have ongoing stress, such as worry about work or school.  Depression, anxiety, and other mental or emotional conditions.  Changes in your sleep habits or surroundings. This includes changes that happen where you sleep, such as noise, light, or sleeping in a different bed. It also includes changes in your sleep pattern, such as having jet lag or working a late shift.  Health problems, such as pain, breathing problems, and restless legs syndrome.  Lack of regular exercise.  Using alcohol, nicotine, or caffeine before bed.  How can you help yourself?  Here are some tips that may help you sleep more soundly and wake up feeling more refreshed.  Your sleeping area   Use your bedroom only for sleeping and sex. A bit of light reading may help you fall asleep. But if it doesn't, do your reading elsewhere in the house. Try not to use your TV, computer, smartphone, or tablet while you are in bed.  Be sure your bed is big enough to stretch out comfortably, especially if you have a sleep partner.  Keep your bedroom quiet, dark, and cool. Use curtains, blinds,  "or a sleep mask to block out light. To block out noise, use earplugs, soothing music, or a \"white noise\" machine.  Your evening and bedtime routine   Create a relaxing bedtime routine. You might want to take a warm shower or bath, or listen to soothing music.  Go to bed at the same time every night. And get up at the same time every morning, even if you feel tired.  What to avoid   Limit caffeine (coffee, tea, caffeinated sodas) during the day, and don't have any for at least 6 hours before bedtime.  Avoid drinking alcohol before bedtime. Alcohol can cause you to wake up more often during the night.  Try not to smoke or use tobacco, especially in the evening. Nicotine can keep you awake.  Limit naps during the day, especially close to bedtime.  Avoid lying in bed awake for too long. If you can't fall asleep or if you wake up in the middle of the night and can't get back to sleep within about 20 minutes, get out of bed and go to another room until you feel sleepy.  Avoid taking medicine right before bed that may keep you awake or make you feel hyper or energized. Your doctor can tell you if your medicine may do this and if you can take it earlier in the day.  If you can't sleep   Imagine yourself in a peaceful, pleasant scene. Focus on the details and feelings of being in a place that is relaxing.  Get up and do a quiet or boring activity until you feel sleepy.  Avoid drinking any liquids before going to bed to help prevent waking up often to use the bathroom.  Where can you learn more?  Go to https://www.[employer].net/patiented  Enter J942 in the search box to learn more about \"Learning About Sleeping Well.\"  Current as of: July 31, 2024  Content Version: 14.5 2024-2025 [employer].   Care instructions adapted under license by your healthcare professional. If you have questions about a medical condition or this instruction, always ask your healthcare professional. [employer] disclaims any " warranty or liability for your use of this information.

## 2025-07-10 NOTE — PROGRESS NOTES
"Preventive Care Visit  [facility] GEORGE Perkins MD, Family Medicine  Jul 10, 2025      Assessment & Plan     Encounter for Medicare annual wellness exam      Recurrent major depression in complete remission  Doing well   - sertraline (ZOLOFT) 50 MG tablet; Take 1 tablet (50 mg) by mouth daily at 2 pm.    Thoracic neuralgia    - gabapentin (NEURONTIN) 400 MG capsule; Take 2 capsules (800 mg) by mouth 2 times daily.    Chronic right-sided thoracic back pain  Takes te  - gabapentin (NEURONTIN) 400 MG capsule; Take 2 capsules (800 mg) by mouth 2 times daily.  - tiZANidine (ZANAFLEX) 2 MG tablet; Take 1 tablet (2 mg) by mouth 2 times daily.    Tremor    - CT Head w/o Contrast; Future  - Adult Neurology  Referral; Future  - CBC with platelets; Future  - Vitamin B12; Future  - Comprehensive metabolic panel (BMP + Alb, Alk Phos, ALT, AST, Total. Bili, TP); Future  - ESR: Erythrocyte sedimentation rate; Future  - CRP, inflammation; Future    Visual disturbance    - CT Head w/o Contrast; Future  - Adult Neurology  Referral; Future  - CBC with platelets; Future  - Vitamin B12; Future  - Comprehensive metabolic panel (BMP + Alb, Alk Phos, ALT, AST, Total. Bili, TP); Future  - ESR: Erythrocyte sedimentation rate; Future  - CRP, inflammation; Future          Post-menopausal  ordered  - DX Bone Density; Future  Patient has been advised of split billing requirements and indicates understanding: Yes    BMI  Estimated body mass index is 25.64 kg/m  as calculated from the following:    Height as of this encounter: 1.505 m (4' 11.25\").    Weight as of this encounter: 58.1 kg (128 lb).       Counseling  Appropriate preventive services were addressed with this patient via screening, questionnaire, or discussion as appropriate for fall prevention, nutrition, physical activity, Tobacco-use cessation, social engagement, weight loss and cognition.  Checklist reviewing preventive services available " has been given to the patient.  Reviewed patient's diet, addressing concerns and/or questions.   Discussed possible causes of fatigue. Reviewed preventive health counseling, as reflected in patient instructions    Follow-up    Follow-up Visit   Expected date:  Jul 17, 2026 (Approximate)      Follow Up Appointment Details:     Follow-up with whom?: PCP    Follow-Up for what?: Medicare Wellness    Welcome or Annual?: Annual Wellness    How?: In Person                 Saroj Carroll is a 74 year old, presenting for the following:  Medicare Visit        7/10/2025     1:06 PM   Additional Questions   Roomed by Coreen OLIVEROS CMA        *Eye exam Tuesday 7/8/25 [facility] optic - normal exam, no change since last year.   *Discuss vertigo - intermittently   *discuss over the counter sleep aid.   *Notice from [facility] that her gabapentin and tizanidine had no refills, last filled Feb 2025.   Has had tremor intentional and this is intermittent   Also the visual disturbance   HPI    Depression   How are you doing with your depression since your last visit? No change  Are you having other symptoms that might be associated with depression? no  Have you had a significant life event?  No   Are you feeling anxious or having panic attacks?   No  Do you have any concerns with your use of alcohol or other drugs? No    Social History     Tobacco Use    Smoking status: Never    Smokeless tobacco: Never   Vaping Use    Vaping status: Never Used   Substance Use Topics    Alcohol use: Not Currently     Comment: Maybe a couple glasses of wine on special Holidays.    Drug use: No         7/8/2024     1:42 PM 2/14/2025     7:47 AM 7/9/2025     2:01 PM   PHQ   PHQ-9 Total Score 3 0  4    Q9: Thoughts of better off dead/self-harm past 2 weeks Not at all Not at all  Not at all       Patient-reported    Proxy-reported         12/14/2018     8:31 AM 7/29/2019     7:56 AM 2/24/2022    10:36 AM   CATALINO-7 SCORE   Total Score 0 1 2         Sees  bilateral abnormality side /peripheral vision few times per week       Advance Care Planning  Document on file is a Health Care Directive or POLST.        7/5/2025   General Health   How would you rate your overall physical health? Good   Feel stress (tense, anxious, or unable to sleep) Not at all         7/5/2025   Nutrition   Diet: Regular (no restrictions)         7/5/2025   Exercise   Days per week of moderate/strenous exercise 6 days   Average minutes spent exercising at this level 30 min         7/5/2025   Social Factors   Frequency of gathering with friends or relatives More than three times a week   Worry food won't last until get money to buy more No   Food not last or not have enough money for food? No   Do you have housing? (Housing is defined as stable permanent housing and does not include staying outside in a car, in a tent, in an abandoned building, in an overnight shelter, or couch-surfing.) Yes   Are you worried about losing your housing? No   Lack of transportation? No   Unable to get utilities (heat,electricity)? No         7/5/2025   Fall Risk   Fallen 2 or more times in the past year? No   Trouble with walking or balance? No          7/5/2025   Activities of Daily Living- Home Safety   Needs help with the following daily activites None of the above   Safety concerns in the home None of the above         7/5/2025   Dental   Dentist two times every year? Yes         7/5/2025   Hearing Screening   Hearing concerns? None of the above         7/5/2025   Driving Risk Screening   Patient/family members have concerns about driving No         7/5/2025   General Alertness/Fatigue Screening   Have you been more tired than usual lately? (!) YES         7/5/2025   Urinary Incontinence Screening   Bothered by leaking urine in past 6 months No       Today's PHQ-9 Score:       7/9/2025     2:01 PM   PHQ-9 SCORE   PHQ-9 Total Score MyChart 4 (Minimal depression)   PHQ-9 Total Score 4        Patient-reported          7/5/2025   Substance Use   Alcohol more than 3/day or more than 7/wk Not Applicable   Do you have a current opioid prescription? No   How severe/bad is pain from 1 to 10? 2/10   Do you use any other substances recreationally? No     Social History     Tobacco Use    Smoking status: Never    Smokeless tobacco: Never   Vaping Use    Vaping status: Never Used   Substance Use Topics    Alcohol use: Not Currently     Comment: Maybe a couple glasses of wine on special Holidays.    Drug use: No           11/15/2024   LAST FHS-7 RESULTS   1st degree relative breast or ovarian cancer No   Any relative bilateral breast cancer No   Any male have breast cancer No   Any ONE woman have BOTH breast AND ovarian cancer No   Any woman with breast cancer before 50yrs No   2 or more relatives with breast AND/OR ovarian cancer No   2 or more relatives with breast AND/OR bowel cancer No        Mammogram Screening - Patient under 40 years of age: Routine Mammogram Screening not recommended.     ASCVD Risk   The 10-year ASCVD risk score (Anjum READ, et al., 2019) is: 6.8%    Values used to calculate the score:      Age: 74 years      Sex: Female      Is Non- : No      Diabetic: No      Tobacco smoker: No      Systolic Blood Pressure: 92 mmHg      Is BP treated: No      HDL Cholesterol: 66 mg/dL      Total Cholesterol: 171 mg/dL            Reviewed and updated as needed this visit by Provider                    Past Medical History:   Diagnosis Date    Arthritis     Many years ago    B12 deficiency     Depressive disorder     Many years ago    History of blood transfusion     Internal bleeding -date on file.    Insomnia     Mood swings      Current providers sharing in care for this patient include:  Patient Care Team:  Namita Perkins MD as PCP - General (Family Medicine)  Namita Perkins MD as Assigned PCP    The following health maintenance items are reviewed in Epic and correct as of  "today:  Health Maintenance   Topic Date Due    COVID-19 VACCINE (5 - 2024-25 season) 09/01/2024    ANNUAL REVIEW OF HM ORDERS  07/09/2025    INFLUENZA VACCINE (1) 09/01/2025    PHQ-9  01/10/2026    MEDICARE ANNUAL WELLNESS VISIT  07/10/2026    FALL RISK ASSESSMENT  07/10/2026    MAMMO SCREENING  11/15/2026    DIABETES SCREENING  07/09/2027    LIPID  05/08/2028    COLORECTAL CANCER SCREENING  08/10/2028    ADVANCE CARE PLANNING  07/12/2029    DTAP/TDAP/TD VACCINE (3 - Td or Tdap) 04/27/2032    DEXA  05/23/2032    HEPATITIS C SCREENING  Completed    DEPRESSION ACTION PLAN  Completed    PNEUMOCOCCAL VACCINE 50+ YEARS  Completed    ZOSTER VACCINE  Completed    RSV VACCINE  Completed    HPV VACCINE  Aged Out    MENINGITIS VACCINE  Aged Out    URINE DRUG SCREEN  Discontinued         Review of Systems  Constitutional, HEENT, cardiovascular, pulmonary, gi and gu systems are negative, except as otherwise noted.     Objective    Exam  BP 92/58 (BP Location: Right arm, Patient Position: Sitting, Cuff Size: Adult Regular)   Pulse 63   Temp 98.7  F (37.1  C) (Tympanic)   Resp 12   Ht 1.505 m (4' 11.25\")   Wt 58.1 kg (128 lb)   SpO2 98%   BMI 25.64 kg/m     Estimated body mass index is 25.64 kg/m  as calculated from the following:    Height as of this encounter: 1.505 m (4' 11.25\").    Weight as of this encounter: 58.1 kg (128 lb).    Physical Exam  GENERAL: alert and no distress  NEURO: Normal strength and tone, mentation intact and speech normal  PSYCH: mentation appears normal, affect normal/bright         7/10/2025   Mini Cog   Clock Draw Score 2 Normal   3 Item Recall 3 objects recalled   Mini Cog Total Score 5              Signed Electronically by: Namita Perkins MD    "

## 2025-07-14 ENCOUNTER — HOSPITAL ENCOUNTER (OUTPATIENT)
Dept: BONE DENSITY | Facility: CLINIC | Age: 75
Discharge: HOME | End: 2025-07-14
Attending: FAMILY MEDICINE | Admitting: FAMILY MEDICINE
Payer: COMMERCIAL

## 2025-07-14 DIAGNOSIS — Z78.0 POST-MENOPAUSAL: ICD-10-CM

## 2025-07-14 PROCEDURE — 77080 DXA BONE DENSITY AXIAL: CPT

## 2025-07-18 ENCOUNTER — HOSPITAL ENCOUNTER (OUTPATIENT)
Dept: CT IMAGING | Facility: HOSPITAL | Age: 75
Discharge: HOME | End: 2025-07-18
Attending: FAMILY MEDICINE | Admitting: FAMILY MEDICINE
Payer: COMMERCIAL

## 2025-07-18 DIAGNOSIS — H53.9 VISUAL DISTURBANCE: ICD-10-CM

## 2025-07-18 DIAGNOSIS — R25.1 TREMOR: ICD-10-CM

## 2025-07-18 PROCEDURE — 70450 CT HEAD/BRAIN W/O DYE: CPT

## 2025-08-21 ENCOUNTER — TRANSFERRED RECORDS (OUTPATIENT)
Dept: HEALTH INFORMATION MANAGEMENT | Facility: CLINIC | Age: 75
End: 2025-08-21
Payer: COMMERCIAL

## (undated) RX ORDER — LIDOCAINE HYDROCHLORIDE 10 MG/ML
INJECTION, SOLUTION EPIDURAL; INFILTRATION; INTRACAUDAL; PERINEURAL
Status: DISPENSED
Start: 2018-08-10